# Patient Record
Sex: FEMALE | Race: WHITE | NOT HISPANIC OR LATINO
[De-identification: names, ages, dates, MRNs, and addresses within clinical notes are randomized per-mention and may not be internally consistent; named-entity substitution may affect disease eponyms.]

---

## 2017-01-05 ENCOUNTER — APPOINTMENT (OUTPATIENT)
Dept: ORTHOPEDIC SURGERY | Facility: CLINIC | Age: 72
End: 2017-01-05

## 2017-02-08 ENCOUNTER — APPOINTMENT (OUTPATIENT)
Dept: CT IMAGING | Facility: CLINIC | Age: 72
End: 2017-02-08

## 2017-02-08 ENCOUNTER — OUTPATIENT (OUTPATIENT)
Dept: OUTPATIENT SERVICES | Facility: HOSPITAL | Age: 72
LOS: 1 days | End: 2017-02-08
Payer: MEDICARE

## 2017-02-08 DIAGNOSIS — Z00.8 ENCOUNTER FOR OTHER GENERAL EXAMINATION: ICD-10-CM

## 2017-02-08 PROCEDURE — 71250 CT THORAX DX C-: CPT

## 2017-02-13 ENCOUNTER — OTHER (OUTPATIENT)
Age: 72
End: 2017-02-13

## 2017-02-16 ENCOUNTER — APPOINTMENT (OUTPATIENT)
Dept: ORTHOPEDIC SURGERY | Facility: CLINIC | Age: 72
End: 2017-02-16

## 2017-02-17 ENCOUNTER — APPOINTMENT (OUTPATIENT)
Dept: ORTHOPEDIC SURGERY | Facility: CLINIC | Age: 72
End: 2017-02-17

## 2017-02-17 VITALS
WEIGHT: 136 LBS | BODY MASS INDEX: 21.86 KG/M2 | SYSTOLIC BLOOD PRESSURE: 116 MMHG | HEIGHT: 66 IN | DIASTOLIC BLOOD PRESSURE: 73 MMHG | HEART RATE: 89 BPM

## 2017-02-17 DIAGNOSIS — Z82.61 FAMILY HISTORY OF ARTHRITIS: ICD-10-CM

## 2017-02-17 DIAGNOSIS — M54.5 LOW BACK PAIN: ICD-10-CM

## 2017-02-17 DIAGNOSIS — M40.205 UNSPECIFIED KYPHOSIS, THORACOLUMBAR REGION: ICD-10-CM

## 2017-02-17 DIAGNOSIS — Z82.62 FAMILY HISTORY OF OSTEOPOROSIS: ICD-10-CM

## 2017-02-17 DIAGNOSIS — Z78.9 OTHER SPECIFIED HEALTH STATUS: ICD-10-CM

## 2017-02-17 DIAGNOSIS — M43.10 SPONDYLOLISTHESIS, SITE UNSPECIFIED: ICD-10-CM

## 2017-04-13 ENCOUNTER — APPOINTMENT (OUTPATIENT)
Dept: INTERNAL MEDICINE | Facility: CLINIC | Age: 72
End: 2017-04-13

## 2017-06-23 ENCOUNTER — OTHER (OUTPATIENT)
Age: 72
End: 2017-06-23

## 2017-07-24 ENCOUNTER — APPOINTMENT (OUTPATIENT)
Dept: DERMATOLOGY | Facility: CLINIC | Age: 72
End: 2017-07-24

## 2017-08-07 ENCOUNTER — APPOINTMENT (OUTPATIENT)
Dept: DERMATOLOGY | Facility: CLINIC | Age: 72
End: 2017-08-07

## 2017-08-16 ENCOUNTER — APPOINTMENT (OUTPATIENT)
Dept: CT IMAGING | Facility: CLINIC | Age: 72
End: 2017-08-16
Payer: MEDICARE

## 2017-08-16 ENCOUNTER — OUTPATIENT (OUTPATIENT)
Dept: OUTPATIENT SERVICES | Facility: HOSPITAL | Age: 72
LOS: 1 days | End: 2017-08-16
Payer: MEDICARE

## 2017-08-16 DIAGNOSIS — Z00.8 ENCOUNTER FOR OTHER GENERAL EXAMINATION: ICD-10-CM

## 2017-08-16 PROCEDURE — 71250 CT THORAX DX C-: CPT

## 2017-08-16 PROCEDURE — 71250 CT THORAX DX C-: CPT | Mod: 26

## 2017-10-17 ENCOUNTER — APPOINTMENT (OUTPATIENT)
Dept: INTERNAL MEDICINE | Facility: CLINIC | Age: 72
End: 2017-10-17
Payer: MEDICARE

## 2017-10-17 PROCEDURE — 99214 OFFICE O/P EST MOD 30 MIN: CPT

## 2017-10-25 ENCOUNTER — APPOINTMENT (OUTPATIENT)
Dept: INTERNAL MEDICINE | Facility: CLINIC | Age: 72
End: 2017-10-25
Payer: MEDICARE

## 2017-10-25 PROCEDURE — 36415 COLL VENOUS BLD VENIPUNCTURE: CPT

## 2017-10-25 PROCEDURE — 99214 OFFICE O/P EST MOD 30 MIN: CPT | Mod: 25

## 2017-11-01 ENCOUNTER — APPOINTMENT (OUTPATIENT)
Dept: CT IMAGING | Facility: CLINIC | Age: 72
End: 2017-11-01
Payer: MEDICARE

## 2017-11-01 ENCOUNTER — OUTPATIENT (OUTPATIENT)
Dept: OUTPATIENT SERVICES | Facility: HOSPITAL | Age: 72
LOS: 1 days | End: 2017-11-01
Payer: MEDICARE

## 2017-11-01 DIAGNOSIS — J18.9 PNEUMONIA, UNSPECIFIED ORGANISM: ICD-10-CM

## 2017-11-01 DIAGNOSIS — R05 COUGH: ICD-10-CM

## 2017-11-01 DIAGNOSIS — Z00.8 ENCOUNTER FOR OTHER GENERAL EXAMINATION: ICD-10-CM

## 2017-11-01 PROCEDURE — 71260 CT THORAX DX C+: CPT | Mod: 26

## 2017-11-01 PROCEDURE — 71260 CT THORAX DX C+: CPT

## 2017-11-07 ENCOUNTER — APPOINTMENT (OUTPATIENT)
Dept: INTERNAL MEDICINE | Facility: CLINIC | Age: 72
End: 2017-11-07
Payer: MEDICARE

## 2017-11-07 PROCEDURE — 99215 OFFICE O/P EST HI 40 MIN: CPT

## 2017-11-09 ENCOUNTER — APPOINTMENT (OUTPATIENT)
Dept: INTERNAL MEDICINE | Facility: CLINIC | Age: 72
End: 2017-11-09

## 2017-11-15 ENCOUNTER — APPOINTMENT (OUTPATIENT)
Dept: PULMONOLOGY | Facility: CLINIC | Age: 72
End: 2017-11-15
Payer: MEDICARE

## 2017-11-15 ENCOUNTER — APPOINTMENT (OUTPATIENT)
Dept: DERMATOLOGY | Facility: CLINIC | Age: 72
End: 2017-11-15
Payer: MEDICARE

## 2017-11-15 VITALS
WEIGHT: 132 LBS | HEIGHT: 62.25 IN | DIASTOLIC BLOOD PRESSURE: 62 MMHG | SYSTOLIC BLOOD PRESSURE: 112 MMHG | BODY MASS INDEX: 23.98 KG/M2

## 2017-11-15 VITALS — OXYGEN SATURATION: 96 % | HEART RATE: 97 BPM

## 2017-11-15 PROCEDURE — 94060 EVALUATION OF WHEEZING: CPT

## 2017-11-15 PROCEDURE — 99214 OFFICE O/P EST MOD 30 MIN: CPT

## 2017-11-15 PROCEDURE — 99204 OFFICE O/P NEW MOD 45 MIN: CPT | Mod: 25

## 2017-11-15 RX ORDER — MELOXICAM 15 MG/1
15 TABLET ORAL
Qty: 30 | Refills: 0 | Status: DISCONTINUED | COMMUNITY
Start: 2016-07-29 | End: 2017-11-15

## 2017-11-15 RX ORDER — OXYCODONE AND ACETAMINOPHEN 5; 325 MG/1; MG/1
5-325 TABLET ORAL
Qty: 12 | Refills: 0 | Status: DISCONTINUED | COMMUNITY
Start: 2016-08-02 | End: 2017-11-15

## 2017-11-15 RX ORDER — METHYLPREDNISOLONE 4 MG/1
4 TABLET ORAL
Qty: 21 | Refills: 0 | Status: DISCONTINUED | COMMUNITY
Start: 2016-10-07 | End: 2017-11-15

## 2017-11-15 RX ORDER — FAMCICLOVIR 500 MG/1
500 TABLET, FILM COATED ORAL
Qty: 21 | Refills: 0 | Status: DISCONTINUED | COMMUNITY
Start: 2016-09-26 | End: 2017-11-15

## 2017-11-29 ENCOUNTER — APPOINTMENT (OUTPATIENT)
Dept: THORACIC SURGERY | Facility: CLINIC | Age: 72
End: 2017-11-29
Payer: MEDICARE

## 2017-11-29 VITALS
SYSTOLIC BLOOD PRESSURE: 145 MMHG | WEIGHT: 132 LBS | HEART RATE: 96 BPM | BODY MASS INDEX: 24.29 KG/M2 | HEIGHT: 62 IN | OXYGEN SATURATION: 100 % | DIASTOLIC BLOOD PRESSURE: 80 MMHG | RESPIRATION RATE: 16 BRPM

## 2017-11-29 PROCEDURE — 99205 OFFICE O/P NEW HI 60 MIN: CPT

## 2017-12-11 ENCOUNTER — APPOINTMENT (OUTPATIENT)
Dept: INTERNAL MEDICINE | Facility: CLINIC | Age: 72
End: 2017-12-11
Payer: MEDICARE

## 2017-12-11 PROCEDURE — 99214 OFFICE O/P EST MOD 30 MIN: CPT

## 2017-12-12 ENCOUNTER — APPOINTMENT (OUTPATIENT)
Dept: VASCULAR SURGERY | Facility: CLINIC | Age: 72
End: 2017-12-12
Payer: MEDICARE

## 2017-12-12 VITALS
HEART RATE: 92 BPM | OXYGEN SATURATION: 96 % | BODY MASS INDEX: 24.66 KG/M2 | TEMPERATURE: 98.7 F | SYSTOLIC BLOOD PRESSURE: 135 MMHG | WEIGHT: 134 LBS | RESPIRATION RATE: 16 BRPM | DIASTOLIC BLOOD PRESSURE: 82 MMHG | HEIGHT: 62 IN

## 2017-12-12 DIAGNOSIS — Z82.49 FAMILY HISTORY OF ISCHEMIC HEART DISEASE AND OTHER DISEASES OF THE CIRCULATORY SYSTEM: ICD-10-CM

## 2017-12-12 DIAGNOSIS — Z83.3 FAMILY HISTORY OF DIABETES MELLITUS: ICD-10-CM

## 2017-12-12 PROCEDURE — 99203 OFFICE O/P NEW LOW 30 MIN: CPT

## 2017-12-15 ENCOUNTER — APPOINTMENT (OUTPATIENT)
Dept: VASCULAR SURGERY | Facility: CLINIC | Age: 72
End: 2017-12-15
Payer: MEDICARE

## 2017-12-15 PROCEDURE — 93970 EXTREMITY STUDY: CPT

## 2017-12-28 PROBLEM — Z82.49 FAMILY HISTORY OF VASCULAR DISORDER: Status: ACTIVE | Noted: 2017-12-12

## 2017-12-28 PROBLEM — Z83.3 FAMILY HISTORY OF DIABETES MELLITUS: Status: ACTIVE | Noted: 2017-12-12

## 2018-01-10 ENCOUNTER — APPOINTMENT (OUTPATIENT)
Dept: PULMONOLOGY | Facility: CLINIC | Age: 73
End: 2018-01-10
Payer: MEDICARE

## 2018-01-10 ENCOUNTER — APPOINTMENT (OUTPATIENT)
Dept: DERMATOLOGY | Facility: CLINIC | Age: 73
End: 2018-01-10
Payer: MEDICARE

## 2018-01-10 VITALS
SYSTOLIC BLOOD PRESSURE: 115 MMHG | WEIGHT: 130 LBS | HEART RATE: 99 BPM | DIASTOLIC BLOOD PRESSURE: 60 MMHG | BODY MASS INDEX: 23.78 KG/M2 | OXYGEN SATURATION: 98 %

## 2018-01-10 PROCEDURE — 99215 OFFICE O/P EST HI 40 MIN: CPT

## 2018-01-10 PROCEDURE — 17110 DESTRUCTION B9 LES UP TO 14: CPT

## 2018-01-10 PROCEDURE — 99214 OFFICE O/P EST MOD 30 MIN: CPT | Mod: 25

## 2018-01-10 RX ORDER — AZITHROMYCIN 250 MG/1
250 TABLET, FILM COATED ORAL
Qty: 6 | Refills: 0 | Status: DISCONTINUED | COMMUNITY
Start: 2017-10-10 | End: 2018-01-10

## 2018-01-10 RX ORDER — CLOTRIMAZOLE AND BETAMETHASONE DIPROPIONATE 10; .5 MG/G; MG/G
1-0.05 CREAM TOPICAL
Qty: 45 | Refills: 0 | Status: DISCONTINUED | COMMUNITY
Start: 2017-08-17 | End: 2018-01-10

## 2018-01-10 RX ORDER — PREDNISONE 20 MG/1
20 TABLET ORAL
Qty: 12 | Refills: 0 | Status: DISCONTINUED | COMMUNITY
Start: 2017-10-17 | End: 2018-01-10

## 2018-01-10 RX ORDER — BENZONATATE 200 MG/1
200 CAPSULE ORAL
Qty: 21 | Refills: 0 | Status: DISCONTINUED | COMMUNITY
Start: 2017-10-25

## 2018-01-28 ENCOUNTER — FORM ENCOUNTER (OUTPATIENT)
Age: 73
End: 2018-01-28

## 2018-01-29 ENCOUNTER — APPOINTMENT (OUTPATIENT)
Dept: ULTRASOUND IMAGING | Facility: CLINIC | Age: 73
End: 2018-01-29
Payer: MEDICARE

## 2018-01-29 ENCOUNTER — APPOINTMENT (OUTPATIENT)
Dept: CT IMAGING | Facility: CLINIC | Age: 73
End: 2018-01-29
Payer: MEDICARE

## 2018-01-29 ENCOUNTER — OUTPATIENT (OUTPATIENT)
Dept: OUTPATIENT SERVICES | Facility: HOSPITAL | Age: 73
LOS: 1 days | End: 2018-01-29
Payer: MEDICARE

## 2018-01-29 ENCOUNTER — APPOINTMENT (OUTPATIENT)
Dept: MAMMOGRAPHY | Facility: CLINIC | Age: 73
End: 2018-01-29
Payer: MEDICARE

## 2018-01-29 DIAGNOSIS — M79.604 PAIN IN RIGHT LEG: ICD-10-CM

## 2018-01-29 DIAGNOSIS — M79.605 PAIN IN LEFT LEG: ICD-10-CM

## 2018-01-29 DIAGNOSIS — R91.1 SOLITARY PULMONARY NODULE: ICD-10-CM

## 2018-01-29 PROCEDURE — 93880 EXTRACRANIAL BILAT STUDY: CPT | Mod: 26

## 2018-01-29 PROCEDURE — 76641 ULTRASOUND BREAST COMPLETE: CPT | Mod: 26,50

## 2018-01-29 PROCEDURE — 71260 CT THORAX DX C+: CPT

## 2018-01-29 PROCEDURE — 77063 BREAST TOMOSYNTHESIS BI: CPT | Mod: 26

## 2018-01-29 PROCEDURE — 93880 EXTRACRANIAL BILAT STUDY: CPT

## 2018-01-29 PROCEDURE — 71260 CT THORAX DX C+: CPT | Mod: 26

## 2018-01-29 PROCEDURE — 77063 BREAST TOMOSYNTHESIS BI: CPT

## 2018-01-29 PROCEDURE — 77067 SCR MAMMO BI INCL CAD: CPT

## 2018-01-29 PROCEDURE — 82565 ASSAY OF CREATININE: CPT

## 2018-01-29 PROCEDURE — 77067 SCR MAMMO BI INCL CAD: CPT | Mod: 26

## 2018-01-29 PROCEDURE — 76641 ULTRASOUND BREAST COMPLETE: CPT

## 2018-02-12 LAB — IGG SER QL IEP: 829 MG/DL

## 2018-02-16 ENCOUNTER — TRANSCRIPTION ENCOUNTER (OUTPATIENT)
Age: 73
End: 2018-02-16

## 2018-03-09 ENCOUNTER — APPOINTMENT (OUTPATIENT)
Dept: THORACIC SURGERY | Facility: CLINIC | Age: 73
End: 2018-03-09
Payer: MEDICARE

## 2018-03-09 VITALS
HEIGHT: 62 IN | BODY MASS INDEX: 23.92 KG/M2 | HEART RATE: 97 BPM | RESPIRATION RATE: 16 BRPM | OXYGEN SATURATION: 90 % | SYSTOLIC BLOOD PRESSURE: 129 MMHG | DIASTOLIC BLOOD PRESSURE: 76 MMHG | WEIGHT: 130 LBS

## 2018-03-09 PROCEDURE — 99213 OFFICE O/P EST LOW 20 MIN: CPT

## 2018-03-22 ENCOUNTER — APPOINTMENT (OUTPATIENT)
Dept: INTERNAL MEDICINE | Facility: CLINIC | Age: 73
End: 2018-03-22

## 2018-04-16 ENCOUNTER — APPOINTMENT (OUTPATIENT)
Dept: INTERNAL MEDICINE | Facility: CLINIC | Age: 73
End: 2018-04-16
Payer: MEDICARE

## 2018-04-16 PROCEDURE — 90670 PCV13 VACCINE IM: CPT

## 2018-04-16 PROCEDURE — G0009: CPT

## 2018-04-16 PROCEDURE — 99214 OFFICE O/P EST MOD 30 MIN: CPT | Mod: 25

## 2018-04-25 ENCOUNTER — APPOINTMENT (OUTPATIENT)
Dept: INTERNAL MEDICINE | Facility: CLINIC | Age: 73
End: 2018-04-25
Payer: MEDICARE

## 2018-04-25 PROCEDURE — 93000 ELECTROCARDIOGRAM COMPLETE: CPT

## 2018-04-25 PROCEDURE — 36415 COLL VENOUS BLD VENIPUNCTURE: CPT

## 2018-04-25 PROCEDURE — G0439: CPT

## 2018-04-25 PROCEDURE — 99215 OFFICE O/P EST HI 40 MIN: CPT | Mod: 25

## 2018-04-30 ENCOUNTER — APPOINTMENT (OUTPATIENT)
Dept: PULMONOLOGY | Facility: CLINIC | Age: 73
End: 2018-04-30
Payer: MEDICARE

## 2018-04-30 VITALS
SYSTOLIC BLOOD PRESSURE: 120 MMHG | BODY MASS INDEX: 24.33 KG/M2 | OXYGEN SATURATION: 99 % | DIASTOLIC BLOOD PRESSURE: 80 MMHG | HEART RATE: 65 BPM | WEIGHT: 133 LBS

## 2018-04-30 PROCEDURE — 99214 OFFICE O/P EST MOD 30 MIN: CPT

## 2018-07-10 ENCOUNTER — RX RENEWAL (OUTPATIENT)
Age: 73
End: 2018-07-10

## 2018-08-08 ENCOUNTER — APPOINTMENT (OUTPATIENT)
Dept: DERMATOLOGY | Facility: CLINIC | Age: 73
End: 2018-08-08
Payer: MEDICARE

## 2018-08-08 PROCEDURE — 99214 OFFICE O/P EST MOD 30 MIN: CPT

## 2018-08-14 ENCOUNTER — MEDICATION RENEWAL (OUTPATIENT)
Age: 73
End: 2018-08-14

## 2018-08-31 ENCOUNTER — MEDICATION RENEWAL (OUTPATIENT)
Age: 73
End: 2018-08-31

## 2018-10-02 ENCOUNTER — MEDICATION RENEWAL (OUTPATIENT)
Age: 73
End: 2018-10-02

## 2018-11-09 ENCOUNTER — MEDICATION RENEWAL (OUTPATIENT)
Age: 73
End: 2018-11-09

## 2018-11-12 ENCOUNTER — APPOINTMENT (OUTPATIENT)
Dept: PULMONOLOGY | Facility: CLINIC | Age: 73
End: 2018-11-12
Payer: MEDICARE

## 2018-11-12 VITALS — OXYGEN SATURATION: 98 % | DIASTOLIC BLOOD PRESSURE: 80 MMHG | HEART RATE: 76 BPM | SYSTOLIC BLOOD PRESSURE: 140 MMHG

## 2018-11-12 VITALS — WEIGHT: 137 LBS | BODY MASS INDEX: 25.06 KG/M2

## 2018-11-12 PROCEDURE — 99214 OFFICE O/P EST MOD 30 MIN: CPT | Mod: 25

## 2018-11-12 PROCEDURE — 94010 BREATHING CAPACITY TEST: CPT

## 2018-12-11 ENCOUNTER — MEDICATION RENEWAL (OUTPATIENT)
Age: 73
End: 2018-12-11

## 2019-01-11 ENCOUNTER — APPOINTMENT (OUTPATIENT)
Dept: INTERNAL MEDICINE | Facility: CLINIC | Age: 74
End: 2019-01-11
Payer: MEDICARE

## 2019-01-11 VITALS
TEMPERATURE: 99.1 F | WEIGHT: 137 LBS | HEIGHT: 62 IN | BODY MASS INDEX: 25.21 KG/M2 | DIASTOLIC BLOOD PRESSURE: 70 MMHG | SYSTOLIC BLOOD PRESSURE: 120 MMHG

## 2019-01-11 DIAGNOSIS — H65.192 OTHER ACUTE NONSUPPURATIVE OTITIS MEDIA, LEFT EAR: ICD-10-CM

## 2019-01-11 PROCEDURE — 99214 OFFICE O/P EST MOD 30 MIN: CPT

## 2019-01-11 RX ORDER — LEVOFLOXACIN 500 MG/1
500 TABLET, FILM COATED ORAL DAILY
Qty: 5 | Refills: 0 | Status: DISCONTINUED | COMMUNITY
Start: 2018-11-12 | End: 2019-01-11

## 2019-01-11 RX ORDER — IBUPROFEN 600 MG/1
600 TABLET, FILM COATED ORAL
Qty: 21 | Refills: 0 | Status: DISCONTINUED | COMMUNITY
Start: 2018-10-08

## 2019-01-11 RX ORDER — DOXYCYCLINE HYCLATE 100 MG/1
100 CAPSULE ORAL
Qty: 14 | Refills: 0 | Status: COMPLETED | COMMUNITY
Start: 2019-01-11 | End: 2019-01-11

## 2019-01-11 RX ORDER — FUROSEMIDE 20 MG/1
20 TABLET ORAL
Qty: 14 | Refills: 0 | Status: DISCONTINUED | COMMUNITY
Start: 2018-12-10

## 2019-01-11 RX ORDER — DIAZEPAM 10 MG/1
10 TABLET ORAL
Qty: 1 | Refills: 0 | Status: DISCONTINUED | COMMUNITY
Start: 2018-09-13

## 2019-01-11 NOTE — REVIEW OF SYSTEMS
[Earache] : earache [Nasal Discharge] : nasal discharge [Sore Throat] : sore throat [Postnasal Drip] : postnasal drip [Cough] : cough [Negative] : Heme/Lymph [Shortness Of Breath] : no shortness of breath [Wheezing] : no wheezing [Dyspnea on Exertion] : no dyspnea on exertion

## 2019-01-11 NOTE — HISTORY OF PRESENT ILLNESS
[FreeTextEntry1] : sore throat / cough [de-identified] : Ms. YELITZA VALDEZ is a 73 year female with a PMH of bronchiectasis comes to the office c/o 2 day history of POst nasal drip, nasal discharge, left ear pain.

## 2019-01-11 NOTE — PHYSICAL EXAM
[No Acute Distress] : no acute distress [Well Nourished] : well nourished [Well Developed] : well developed [Well-Appearing] : well-appearing [Normal Sclera/Conjunctiva] : normal sclera/conjunctiva [PERRL] : pupils equal round and reactive to light [EOMI] : extraocular movements intact [Normal Outer Ear/Nose] : the outer ears and nose were normal in appearance [No JVD] : no jugular venous distention [Supple] : supple [No Respiratory Distress] : no respiratory distress  [Clear to Auscultation] : lungs were clear to auscultation bilaterally [No Accessory Muscle Use] : no accessory muscle use [Normal Rate] : normal rate  [Regular Rhythm] : with a regular rhythm [Normal S1, S2] : normal S1 and S2 [No Carotid Bruits] : no carotid bruits [No Abdominal Bruit] : a ~M bruit was not heard ~T in the abdomen [No Varicosities] : no varicosities [Soft] : abdomen soft [Non Tender] : non-tender [Non-distended] : non-distended [Normal Posterior Cervical Nodes] : no posterior cervical lymphadenopathy [Normal Anterior Cervical Nodes] : no anterior cervical lymphadenopathy [No CVA Tenderness] : no CVA  tenderness [No Spinal Tenderness] : no spinal tenderness [de-identified] : left TM erythematous and bulging, cobblestoning of pharynx

## 2019-01-11 NOTE — PLAN
[FreeTextEntry1] : Patient likely has ottitis media with sinusitis and postnasal drip. WIll start doxycycline as patient has PCN allergy.\par \par Counseling included abnormal lab results, differential diagnoses, treatment options, risks and benefits, lifestyle changes, prognosis, current condition, medications, and dose adjustments. \par The patient was interactive, attentive, asked questions, and verbalized understanding

## 2019-01-14 ENCOUNTER — FORM ENCOUNTER (OUTPATIENT)
Age: 74
End: 2019-01-14

## 2019-01-15 ENCOUNTER — APPOINTMENT (OUTPATIENT)
Dept: CT IMAGING | Facility: CLINIC | Age: 74
End: 2019-01-15
Payer: MEDICARE

## 2019-01-15 ENCOUNTER — OUTPATIENT (OUTPATIENT)
Dept: OUTPATIENT SERVICES | Facility: HOSPITAL | Age: 74
LOS: 1 days | End: 2019-01-15
Payer: MEDICARE

## 2019-01-15 DIAGNOSIS — R91.1 SOLITARY PULMONARY NODULE: ICD-10-CM

## 2019-01-15 PROCEDURE — 71250 CT THORAX DX C-: CPT

## 2019-01-15 PROCEDURE — 71250 CT THORAX DX C-: CPT | Mod: 26

## 2019-01-18 ENCOUNTER — MESSAGE (OUTPATIENT)
Age: 74
End: 2019-01-18

## 2019-01-23 ENCOUNTER — MEDICATION RENEWAL (OUTPATIENT)
Age: 74
End: 2019-01-23

## 2019-01-25 ENCOUNTER — MEDICATION RENEWAL (OUTPATIENT)
Age: 74
End: 2019-01-25

## 2019-01-25 RX ORDER — DEXTROAMPHETAMINE SACCHARATE, AMPHETAMINE ASPARTATE, DEXTROAMPHETAMINE SULFATE AND AMPHETAMINE SULFATE 7.5; 7.5; 7.5; 7.5 MG/1; MG/1; MG/1; MG/1
30 TABLET ORAL
Qty: 90 | Refills: 0 | Status: DISCONTINUED | COMMUNITY
End: 2019-01-25

## 2019-01-27 ENCOUNTER — MOBILE ON CALL (OUTPATIENT)
Age: 74
End: 2019-01-27

## 2019-01-30 ENCOUNTER — APPOINTMENT (OUTPATIENT)
Dept: INTERNAL MEDICINE | Facility: CLINIC | Age: 74
End: 2019-01-30
Payer: MEDICARE

## 2019-01-30 VITALS
SYSTOLIC BLOOD PRESSURE: 100 MMHG | BODY MASS INDEX: 24.48 KG/M2 | HEIGHT: 62 IN | WEIGHT: 133 LBS | DIASTOLIC BLOOD PRESSURE: 70 MMHG

## 2019-01-30 DIAGNOSIS — J06.9 ACUTE UPPER RESPIRATORY INFECTION, UNSPECIFIED: ICD-10-CM

## 2019-01-30 PROCEDURE — 99214 OFFICE O/P EST MOD 30 MIN: CPT

## 2019-01-30 NOTE — PLAN
[FreeTextEntry1] : 74YO FEMALE HERE FOR FOLLOWUP HAS HX OF ADD HAS BEEN ON ADDERALL FOR YEARS WAS GIVEN BY PREVIOUS PMD  DR KONG AND DID ORIGINALLY SEE NEUROLOGIST PRIOR TO PMD GIVING ADDERALL\par HAS HAD COUGH URI SX AND HAS BEEN ON ABX\par HAS SEEN PULMONARY AS HAS HAD A NODULE HAT THEY  HAVE BEEN FOLLOWING \par WILL RX MEDROL DOSE PACK\par AS PT ON ADDERALL WILL FOLLOW UP HERE MORE OFTNE\par

## 2019-01-30 NOTE — HISTORY OF PRESENT ILLNESS
[FreeTextEntry1] : Cough  for  3 Weeks [de-identified] : 72YO FEMALE HERE FOR FOLLOWUP HAS HX OF ADD HAS BEEN ON ADDERALL FOR YEARS WAS GIVEN BY PREVIOUS PMD  DR KONG AND DID ORIGINALLY SEE NEUROLOGIST PRIOR TO PMD GIVING ADDERALL\par HAS HAD COUGH URI SX AND HAS BEEN ON ABX\par HAS SEEN PULMONARY AS HAS HAD A NODULE HAT THEY  HAVE BEEN FOLLOWING \par

## 2019-02-27 ENCOUNTER — APPOINTMENT (OUTPATIENT)
Dept: DERMATOLOGY | Facility: CLINIC | Age: 74
End: 2019-02-27
Payer: MEDICARE

## 2019-02-27 PROCEDURE — 99214 OFFICE O/P EST MOD 30 MIN: CPT

## 2019-03-21 ENCOUNTER — MEDICATION RENEWAL (OUTPATIENT)
Age: 74
End: 2019-03-21

## 2019-04-25 ENCOUNTER — APPOINTMENT (OUTPATIENT)
Dept: PULMONOLOGY | Facility: CLINIC | Age: 74
End: 2019-04-25

## 2019-05-07 ENCOUNTER — MEDICATION RENEWAL (OUTPATIENT)
Age: 74
End: 2019-05-07

## 2019-05-29 ENCOUNTER — APPOINTMENT (OUTPATIENT)
Dept: INTERNAL MEDICINE | Facility: CLINIC | Age: 74
End: 2019-05-29
Payer: MEDICARE

## 2019-05-29 ENCOUNTER — NON-APPOINTMENT (OUTPATIENT)
Age: 74
End: 2019-05-29

## 2019-05-29 VITALS
SYSTOLIC BLOOD PRESSURE: 120 MMHG | HEIGHT: 62 IN | WEIGHT: 132 LBS | BODY MASS INDEX: 24.29 KG/M2 | DIASTOLIC BLOOD PRESSURE: 70 MMHG

## 2019-05-29 DIAGNOSIS — Z23 ENCOUNTER FOR IMMUNIZATION: ICD-10-CM

## 2019-05-29 PROCEDURE — 36415 COLL VENOUS BLD VENIPUNCTURE: CPT

## 2019-05-29 PROCEDURE — G0439: CPT

## 2019-05-29 PROCEDURE — 93000 ELECTROCARDIOGRAM COMPLETE: CPT

## 2019-05-29 PROCEDURE — 90732 PPSV23 VACC 2 YRS+ SUBQ/IM: CPT

## 2019-05-29 PROCEDURE — 99215 OFFICE O/P EST HI 40 MIN: CPT | Mod: 25

## 2019-05-29 PROCEDURE — G0009: CPT

## 2019-05-29 NOTE — HISTORY OF PRESENT ILLNESS
[FreeTextEntry1] : ANNUAL EXAM [de-identified] : 72YO FEMALE HERE FOR ANNUAL EXAM  HAS HX OF ADD HAS BEEN ON ADDERALL FOR YEARS WAS GIVEN BY PREVIOUS PMD  DR KONG AND DID ORIGINALLY SEE NEUROLOGIST PRIOR TO PMD GIVING ADDERALL\par  \par HAS SEEN PULMONARY AS HAS HAD A NODULE HAT THEY  HAVE BEEN FOLLOWING THIS \par OVERALL FEELS OK \par

## 2019-05-29 NOTE — PHYSICAL EXAM
[No Acute Distress] : no acute distress [Well Nourished] : well nourished [Well Developed] : well developed [Normal Sclera/Conjunctiva] : normal sclera/conjunctiva [Well-Appearing] : well-appearing [PERRL] : pupils equal round and reactive to light [EOMI] : extraocular movements intact [Normal Oropharynx] : the oropharynx was normal [Normal Outer Ear/Nose] : the outer ears and nose were normal in appearance [Supple] : supple [No JVD] : no jugular venous distention [Thyroid Normal, No Nodules] : the thyroid was normal and there were no nodules present [No Lymphadenopathy] : no lymphadenopathy [No Respiratory Distress] : no respiratory distress  [Clear to Auscultation] : lungs were clear to auscultation bilaterally [No Accessory Muscle Use] : no accessory muscle use [Normal Rate] : normal rate  [Regular Rhythm] : with a regular rhythm [Normal S1, S2] : normal S1 and S2 [No Abdominal Bruit] : a ~M bruit was not heard ~T in the abdomen [No Carotid Bruits] : no carotid bruits [No Murmur] : no murmur heard [Pedal Pulses Present] : the pedal pulses are present [No Varicosities] : no varicosities [No Edema] : there was no peripheral edema [No Extremity Clubbing/Cyanosis] : no extremity clubbing/cyanosis [Soft] : abdomen soft [No Palpable Aorta] : no palpable aorta [Non-distended] : non-distended [Non Tender] : non-tender [No HSM] : no HSM [No Masses] : no abdominal mass palpated [Normal Bowel Sounds] : normal bowel sounds [Normal Anterior Cervical Nodes] : no anterior cervical lymphadenopathy [No CVA Tenderness] : no CVA  tenderness [Normal Posterior Cervical Nodes] : no posterior cervical lymphadenopathy [No Spinal Tenderness] : no spinal tenderness [No Joint Swelling] : no joint swelling [Grossly Normal Strength/Tone] : grossly normal strength/tone [No Rash] : no rash [Normal Gait] : normal gait [No Focal Deficits] : no focal deficits [Coordination Grossly Intact] : coordination grossly intact [Normal Insight/Judgement] : insight and judgment were intact [Deep Tendon Reflexes (DTR)] : deep tendon reflexes were 2+ and symmetric [Normal Affect] : the affect was normal

## 2019-05-29 NOTE — PLAN
[FreeTextEntry1] : 74YO FEMALE HERE FOR  ANNUAL EXAM HAS HX OF ADD HAS BEEN ON ADDERALL FOR YEARS WAS GIVEN BY PREVIOUS PMD  DR KONG AND DID ORIGINALLY SEE NEUROLOGIST PRIOR TO PMD GIVING ADDERALL\par  HAS SEEN PULMONARY AS HAS HAD A NODULE HAT THEY  HAVE BEEN FOLLOWING \par  AS PT ON ADDERALL WILL FOLLOW UP HERE MORE OFTEN\par UP TO DATE COLONOSCOPY\par PNEUMOVAX 23 GIVEN TODAY\par \par

## 2019-05-29 NOTE — HEALTH RISK ASSESSMENT
[No falls in past year] : Patient reported no falls in the past year [] : No [0] : 1) Little interest or pleasure doing things: Not at all (0)

## 2019-06-03 LAB
25(OH)D3 SERPL-MCNC: 25.2 NG/ML
ALBUMIN SERPL ELPH-MCNC: 4.7 G/DL
ALP BLD-CCNC: 88 U/L
ALT SERPL-CCNC: 11 U/L
ANION GAP SERPL CALC-SCNC: 12 MMOL/L
APPEARANCE: CLEAR
AST SERPL-CCNC: 13 U/L
BASOPHILS # BLD AUTO: 0.04 K/UL
BASOPHILS NFR BLD AUTO: 0.7 %
BILIRUB SERPL-MCNC: 0.4 MG/DL
BILIRUBIN URINE: NEGATIVE
BLOOD URINE: NEGATIVE
BUN SERPL-MCNC: 10 MG/DL
CALCIUM SERPL-MCNC: 9.8 MG/DL
CHLORIDE SERPL-SCNC: 105 MMOL/L
CHOLEST SERPL-MCNC: 203 MG/DL
CHOLEST/HDLC SERPL: 2.5 RATIO
CO2 SERPL-SCNC: 25 MMOL/L
COLOR: NORMAL
CREAT SERPL-MCNC: 0.55 MG/DL
EOSINOPHIL # BLD AUTO: 0.08 K/UL
EOSINOPHIL NFR BLD AUTO: 1.3 %
ESTIMATED AVERAGE GLUCOSE: 111 MG/DL
GLUCOSE QUALITATIVE U: NEGATIVE
GLUCOSE SERPL-MCNC: 90 MG/DL
HBA1C MFR BLD HPLC: 5.5 %
HCT VFR BLD CALC: 45.6 %
HDLC SERPL-MCNC: 80 MG/DL
HGB BLD-MCNC: 13.8 G/DL
IMM GRANULOCYTES NFR BLD AUTO: 0.3 %
KETONES URINE: NEGATIVE
LDLC SERPL CALC-MCNC: 109 MG/DL
LEUKOCYTE ESTERASE URINE: NEGATIVE
LYMPHOCYTES # BLD AUTO: 2.2 K/UL
LYMPHOCYTES NFR BLD AUTO: 36.4 %
MAN DIFF?: NORMAL
MCHC RBC-ENTMCNC: 29.4 PG
MCHC RBC-ENTMCNC: 30.3 GM/DL
MCV RBC AUTO: 97 FL
MONOCYTES # BLD AUTO: 0.4 K/UL
MONOCYTES NFR BLD AUTO: 6.6 %
NEUTROPHILS # BLD AUTO: 3.31 K/UL
NEUTROPHILS NFR BLD AUTO: 54.7 %
NITRITE URINE: NEGATIVE
PH URINE: 7
PLATELET # BLD AUTO: 276 K/UL
POTASSIUM SERPL-SCNC: 4.6 MMOL/L
PROT SERPL-MCNC: 6.5 G/DL
PROTEIN URINE: NEGATIVE
RBC # BLD: 4.7 M/UL
RBC # FLD: 14 %
SODIUM SERPL-SCNC: 142 MMOL/L
SPECIFIC GRAVITY URINE: 1.01
T4 SERPL-MCNC: 7.1 UG/DL
TRIGL SERPL-MCNC: 71 MG/DL
TSH SERPL-ACNC: 2.07 UIU/ML
UROBILINOGEN URINE: NORMAL
WBC # FLD AUTO: 6.05 K/UL

## 2019-06-07 ENCOUNTER — APPOINTMENT (OUTPATIENT)
Dept: INTERNAL MEDICINE | Facility: CLINIC | Age: 74
End: 2019-06-07
Payer: MEDICARE

## 2019-06-07 VITALS
BODY MASS INDEX: 17.88 KG/M2 | WEIGHT: 132 LBS | DIASTOLIC BLOOD PRESSURE: 75 MMHG | HEIGHT: 72 IN | SYSTOLIC BLOOD PRESSURE: 115 MMHG

## 2019-06-07 PROCEDURE — 99214 OFFICE O/P EST MOD 30 MIN: CPT

## 2019-06-07 NOTE — HISTORY OF PRESENT ILLNESS
[FreeTextEntry1] : FOLLOW UP ON LEGS [de-identified] : 72YO FEMALE HERE  FOLLOW UP ON LOWER EXTREMITY STASIS DERMATITIS AND VENOUS INSUFFICIENCY\par

## 2019-06-07 NOTE — PHYSICAL EXAM
[No Acute Distress] : no acute distress [Well-Appearing] : well-appearing [Well Nourished] : well nourished [Well Developed] : well developed [Normal Sclera/Conjunctiva] : normal sclera/conjunctiva [PERRL] : pupils equal round and reactive to light [Normal Outer Ear/Nose] : the outer ears and nose were normal in appearance [EOMI] : extraocular movements intact [No JVD] : no jugular venous distention [Normal Oropharynx] : the oropharynx was normal [Supple] : supple [No Lymphadenopathy] : no lymphadenopathy [No Respiratory Distress] : no respiratory distress  [Thyroid Normal, No Nodules] : the thyroid was normal and there were no nodules present [Clear to Auscultation] : lungs were clear to auscultation bilaterally [Normal Rate] : normal rate  [No Accessory Muscle Use] : no accessory muscle use [Regular Rhythm] : with a regular rhythm [Normal S1, S2] : normal S1 and S2 [No Murmur] : no murmur heard [No Carotid Bruits] : no carotid bruits [No Edema] : there was no peripheral edema [Soft] : abdomen soft [Non Tender] : non-tender [No HSM] : no HSM [No Masses] : no abdominal mass palpated [Non-distended] : non-distended [Normal Bowel Sounds] : normal bowel sounds [Normal Posterior Cervical Nodes] : no posterior cervical lymphadenopathy [Normal Anterior Cervical Nodes] : no anterior cervical lymphadenopathy [No CVA Tenderness] : no CVA  tenderness [No Spinal Tenderness] : no spinal tenderness [No Joint Swelling] : no joint swelling [Grossly Normal Strength/Tone] : grossly normal strength/tone [No Rash] : no rash [Normal Gait] : normal gait [Coordination Grossly Intact] : coordination grossly intact [No Focal Deficits] : no focal deficits [Deep Tendon Reflexes (DTR)] : deep tendon reflexes were 2+ and symmetric [Normal Affect] : the affect was normal [Normal Insight/Judgement] : insight and judgment were intact [de-identified] : VENOSU STASI CHANGES LOWE EXTREMITS WITH PLAQUES  NO OPEN ULCERS

## 2019-06-07 NOTE — PLAN
[FreeTextEntry1] : 74YO FEMALE HERE FOR FOLLOWUP STASIS DERMATITIS NO CHANGE WITH LIDEX WILL D/C REFER TO VASCULAR SURGERY FOR EVAL DEFER ABX NO INFECTION \par \par

## 2019-06-13 ENCOUNTER — MEDICATION RENEWAL (OUTPATIENT)
Age: 74
End: 2019-06-13

## 2019-06-24 ENCOUNTER — NON-APPOINTMENT (OUTPATIENT)
Age: 74
End: 2019-06-24

## 2019-06-24 ENCOUNTER — APPOINTMENT (OUTPATIENT)
Dept: CARDIOLOGY | Facility: CLINIC | Age: 74
End: 2019-06-24
Payer: MEDICARE

## 2019-06-24 VITALS
HEART RATE: 80 BPM | HEIGHT: 63 IN | WEIGHT: 131 LBS | DIASTOLIC BLOOD PRESSURE: 79 MMHG | OXYGEN SATURATION: 96 % | SYSTOLIC BLOOD PRESSURE: 130 MMHG | RESPIRATION RATE: 16 BRPM | BODY MASS INDEX: 23.21 KG/M2

## 2019-06-24 VITALS — DIASTOLIC BLOOD PRESSURE: 88 MMHG | SYSTOLIC BLOOD PRESSURE: 129 MMHG

## 2019-06-24 PROCEDURE — 93000 ELECTROCARDIOGRAM COMPLETE: CPT

## 2019-06-24 PROCEDURE — 99204 OFFICE O/P NEW MOD 45 MIN: CPT | Mod: 25

## 2019-06-24 RX ORDER — FLUOCINONIDE 0.05 MG/G
0.05 OINTMENT TOPICAL TWICE DAILY
Qty: 1 | Refills: 1 | Status: DISCONTINUED | COMMUNITY
Start: 2019-05-29 | End: 2019-06-24

## 2019-06-24 RX ORDER — LEVOFLOXACIN 500 MG/1
500 TABLET, FILM COATED ORAL DAILY
Qty: 7 | Refills: 0 | Status: DISCONTINUED | COMMUNITY
Start: 2019-01-11 | End: 2019-06-24

## 2019-06-24 RX ORDER — CLINDAMYCIN HYDROCHLORIDE 300 MG/1
300 CAPSULE ORAL 3 TIMES DAILY
Qty: 30 | Refills: 0 | Status: DISCONTINUED | COMMUNITY
Start: 2019-01-17 | End: 2019-06-24

## 2019-06-24 RX ORDER — AMITRIPTYLINE HYDROCHLORIDE 25 MG/1
25 TABLET, FILM COATED ORAL
Qty: 30 | Refills: 0 | Status: DISCONTINUED | COMMUNITY
Start: 2016-11-28 | End: 2019-06-24

## 2019-06-24 RX ORDER — ALBUTEROL SULFATE 90 UG/1
108 (90 BASE) AEROSOL, METERED RESPIRATORY (INHALATION)
Qty: 1 | Refills: 5 | Status: DISCONTINUED | COMMUNITY
Start: 2018-01-10 | End: 2019-06-24

## 2019-06-24 RX ORDER — ALBUTEROL SULFATE 2.5 MG/3ML
(2.5 MG/3ML) SOLUTION RESPIRATORY (INHALATION)
Qty: 1 | Refills: 3 | Status: DISCONTINUED | COMMUNITY
Start: 2018-01-10 | End: 2019-06-24

## 2019-06-24 RX ORDER — BROMPHENIRAMINE MALEATE, PSEUDOEPHEDRINE HYDROCHLORIDE AND DEXTROMETHORPHAN HYDROBROMIDE 2; 30; 10 MG/5ML; MG/5ML; MG/5ML
30-2-10 SYRUP ORAL
Qty: 1 | Refills: 0 | Status: DISCONTINUED | COMMUNITY
Start: 2019-01-11 | End: 2019-06-24

## 2019-06-24 NOTE — HISTORY OF PRESENT ILLNESS
[FreeTextEntry1] : 74 yo female ween with leg edema.\par She has varicose vein from years of standing, some genetic factors and bearing two children. She had vein ablation with compilations including pain in lower extremity as well as discoloration of the leg. She has no chest pain. She does not smoke and denies any claudication.\par She denies any syncope or presyncope.

## 2019-06-24 NOTE — PHYSICAL EXAM
[Normal Appearance] : normal appearance [Well Groomed] : well groomed [Normal Conjunctiva] : the conjunctiva exhibited no abnormalities [Eyelids - No Xanthelasma] : the eyelids demonstrated no xanthelasmas [Normal Oral Mucosa] : normal oral mucosa [Normal Jugular Venous V Waves Present] : normal jugular venous V waves present [Normal Oropharynx] : normal oropharynx [Heart Rate And Rhythm] : heart rate and rhythm were normal [Heart Sounds] : normal S1 and S2 [Veins - Varicosity Changes] : no varicosital changes were noted in the lower extremities [FreeTextEntry1] : superficial thrombosis of the right inner calf, 1+ edema right leg, no edema left leg. DP normal bilaterally.  [Respiration, Rhythm And Depth] : normal respiratory rhythm and effort [Auscultation Breath Sounds / Voice Sounds] : lungs were clear to auscultation bilaterally [Bowel Sounds] : normal bowel sounds [Abdomen Soft] : soft [Abdomen Tenderness] : non-tender [Abnormal Walk] : normal gait [Nail Clubbing] : no clubbing of the fingernails [Cyanosis, Localized] : no localized cyanosis [Skin Color & Pigmentation] : normal skin color and pigmentation [Skin Turgor] : normal skin turgor [Oriented To Time, Place, And Person] : oriented to person, place, and time [Impaired Insight] : insight and judgment were intact [Affect] : the affect was normal

## 2019-06-24 NOTE — REVIEW OF SYSTEMS
[Recent Weight Gain (___ Lbs)] : no recent weight gain [Feeling Fatigued] : not feeling fatigued [Recent Weight Loss (___ Lbs)] : no recent weight loss [Blurry Vision] : no blurred vision [Seeing Double (Diplopia)] : no diplopia [Earache] : no earache [Discharge From The Ears] : no discharge from the ears [Dyspnea on exertion] : not dyspnea during exertion [Shortness Of Breath] : no shortness of breath [Chest Pain] : no chest pain [Lower Ext Edema] : lower extremity edema [Palpitations] : no palpitations [Wheezing] : no wheezing [Abdominal Pain] : no abdominal pain [Dysuria] : no dysuria [Nausea] : no nausea [Pelvic Pain] : no pelvic pain [Joint Pain] : no joint pain [Muscle Cramps] : no muscle cramps [Skin: A Rash] : no rash: [Skin Lesions] : no skin lesions [Dizziness] : no dizziness [Convulsions] : no convulsions [Confusion] : no confusion was observed [Anxiety] : no anxiety [Excessive Thirst] : no polydipsia [Easy Bleeding] : no tendency for easy bleeding [Easy Bruising] : no tendency for easy bruising

## 2019-06-24 NOTE — ASSESSMENT
[FreeTextEntry1] : 74 yo female with leg edema s/p vein ablation and possible superficial thrombosis of the inner calf. She has seen the surgeon who performed the procedure multiple times. She is concerned that edema is related to a cardiac etiology. She has unilateral leg edema and has varicose veins in the left leg as well. \par I advised her to see vascular surgery. 	 will perform an echocardiogram as well. \par She knows to let me know if she develops any other symptoms. \par She will see me in a few months.

## 2019-06-25 ENCOUNTER — APPOINTMENT (OUTPATIENT)
Dept: VASCULAR SURGERY | Facility: CLINIC | Age: 74
End: 2019-06-25
Payer: MEDICARE

## 2019-06-25 PROCEDURE — 93970 EXTREMITY STUDY: CPT

## 2019-06-25 PROCEDURE — 29580 STRAPPING UNNA BOOT: CPT | Mod: RT

## 2019-07-02 NOTE — HISTORY OF PRESENT ILLNESS
[FreeTextEntry1] : 74 yo female with history of RLE reflux and venous insufficiency to BLE. She has seen 3 different vascular surgeons in the past 2 years regarding her legs. She had a RLE GSV RFA, by Dr Leyva in [place], and has increased discoloration, edema and slight tenderness to RLE. She reports skin is now hard touch in some areas. No throbbing, aching or fatigue to legs. She denies ulceration over her legs, claudication, gangrene, or rest pain.

## 2019-07-02 NOTE — REASON FOR VISIT
[Consultation] : a consultation visit [FreeTextEntry1] : venous insufficiency and discoloration to legs.

## 2019-07-02 NOTE — REVIEW OF SYSTEMS
[Lower Ext Edema] : lower extremity edema [Limb Swelling] : limb swelling [Negative] : Heme/Lymph [Leg Claudication] : no intermittent leg claudication [Limb Pain] : no limb pain

## 2019-07-02 NOTE — ASSESSMENT
[Arterial/Venous Disease] : arterial/venous disease [FreeTextEntry1] : 73 yo F with bilateral LE edema and discomfort after standing for long periods of time. U/S RLE demonstrates SVT in calf and enlarged LN in right groin, no DVT. Discussed venous insufficiency and skin changes and treatment options: Unna Boot, compression stockings, ambulation and elevation of legs. \par \par \par Plan:\par Continue with conservative measures: compression, ambulation and elevation of legs above the level of the heart at rest.\par Maintain adequate hydration. \par \par U/S Venous Duplex completed in office today.\par Will arrange for Lymphedema pumps. Pt agrees.\par RTC in 3 weeks\par \par \par \par \par

## 2019-07-02 NOTE — PHYSICAL EXAM
[2+] : left 2+ [de-identified] : WD, WN, NAD. Awake, alert, interactive. Ambulates without difficulty [de-identified] : CHARLETTE, PERHAMILTONL [de-identified] : supple [FreeTextEntry1] : Chronic venous stasis hyperpigmentation BLE R>L with mild edema.\par fibrosis RLE medial aspect mid calf. There are numerous varicose veins BLE.\par No erythema. No s/s of infection. [de-identified] : JERER

## 2019-07-02 NOTE — PROCEDURE
[FreeTextEntry1] : U/S Venous Duplex BLE 6/25/19 demonstrate no DVT, or GSV. Right- There is SVT in calf and enlarged LN in groin. Left - SSV with reflux > 2 sec from proximal to mid calf with chronic changes.\par \par Unna Boot RLE

## 2019-07-30 ENCOUNTER — APPOINTMENT (OUTPATIENT)
Dept: VASCULAR SURGERY | Facility: CLINIC | Age: 74
End: 2019-07-30

## 2019-10-02 ENCOUNTER — MEDICATION RENEWAL (OUTPATIENT)
Age: 74
End: 2019-10-02

## 2019-11-26 ENCOUNTER — MEDICATION RENEWAL (OUTPATIENT)
Age: 74
End: 2019-11-26

## 2019-12-13 ENCOUNTER — APPOINTMENT (OUTPATIENT)
Dept: INTERNAL MEDICINE | Facility: CLINIC | Age: 74
End: 2019-12-13
Payer: MEDICARE

## 2019-12-13 PROCEDURE — 90662 IIV NO PRSV INCREASED AG IM: CPT

## 2019-12-13 PROCEDURE — G0008: CPT

## 2020-01-28 ENCOUNTER — APPOINTMENT (OUTPATIENT)
Dept: INTERNAL MEDICINE | Facility: CLINIC | Age: 75
End: 2020-01-28
Payer: MEDICARE

## 2020-01-28 VITALS
BODY MASS INDEX: 23.21 KG/M2 | TEMPERATURE: 98 F | SYSTOLIC BLOOD PRESSURE: 126 MMHG | HEIGHT: 63 IN | DIASTOLIC BLOOD PRESSURE: 82 MMHG | WEIGHT: 131 LBS | HEART RATE: 99 BPM | OXYGEN SATURATION: 97 %

## 2020-01-28 PROCEDURE — 99214 OFFICE O/P EST MOD 30 MIN: CPT

## 2020-01-28 NOTE — REVIEW OF SYSTEMS
[Chills] : chills [Earache] : earache [Nasal Discharge] : nasal discharge [Cough] : cough [Negative] : Heme/Lymph [Fatigue] : no fatigue [Hot Flashes] : no hot flashes [Fever] : no fever [Night Sweats] : no night sweats [Recent Change In Weight] : ~T no recent weight change [Nosebleed] : no nosebleeds [Hoarseness] : no hoarseness [Hearing Loss] : no hearing loss [Postnasal Drip] : no postnasal drip [Sore Throat] : no sore throat [Wheezing] : no wheezing [Shortness Of Breath] : no shortness of breath [FreeTextEntry4] : pressure in ears b/l, rhinorrhea with clear discharge [Dyspnea on Exertion] : no dyspnea on exertion

## 2020-01-28 NOTE — HISTORY OF PRESENT ILLNESS
[FreeTextEntry1] : "cough and runny nose" x 2 weeks  [de-identified] : Patient is a 74 year old female with a past medical history of bronchiectasis and pulmonary nodule presenting with sinus congestion, rhinorrhea and cough for the past 2 weeks. Patient states rhinorrhea originally produced green discharge, which has since been clear in the past week. Cough has been persistent for the past 2 weeks with no sputum production. Patient also complains of pressure sensation in b/l ears for the past 2 weeks, as well as intermittent chills. She has been taking OTC Claritin, Allegra, and Flonase with minimal relief. Patient traveled by plane from [place] to [place] on January 21st where she admits to potential sick contact. Patient denies fever, odynophagia, fatigue, myalgia, nausea, vomiting, diarrhea, shortness of breath, chest pain, headache. She received yearly influenza vaccination. Patient also requesting prescription for routine mammogram and ultrasound of b/l breasts as well as annual screening of pulmonary nodule via CT scan.

## 2020-01-28 NOTE — PLAN
[FreeTextEntry1] : Patient with sinusitis, will start antibiotics and intranasal steroid today. \par \par In regards to breast cancer screening, Mammogram ordered\par \par In regards to history of pulmonary nodule, patient will go to repeat CT chest. and follow up with pulmonologist. \par \par Counseling included abnormal lab results, differential diagnoses, treatment options, risks and benefits, lifestyle changes, prognosis, current condition, medications, and dose adjustments. \par The patient was interactive, attentive, asked questions, and verbalized understanding

## 2020-01-28 NOTE — PHYSICAL EXAM
[No Acute Distress] : no acute distress [Well Nourished] : well nourished [Well Developed] : well developed [Well-Appearing] : well-appearing [PERRL] : pupils equal round and reactive to light [Normal Sclera/Conjunctiva] : normal sclera/conjunctiva [EOMI] : extraocular movements intact [Normal Outer Ear/Nose] : the outer ears and nose were normal in appearance [Normal TMs] : both tympanic membranes were normal [Normal] : affect was normal and insight and judgment were intact [No Focal Deficits] : no focal deficits [Coordination Grossly Intact] : coordination grossly intact [Normal Gait] : normal gait [de-identified] : fullness of TM bilaterally, cobblestoning of pharynx maxillary sinus tenderness on leftt side

## 2020-02-11 ENCOUNTER — FORM ENCOUNTER (OUTPATIENT)
Age: 75
End: 2020-02-11

## 2020-02-12 ENCOUNTER — OUTPATIENT (OUTPATIENT)
Dept: OUTPATIENT SERVICES | Facility: HOSPITAL | Age: 75
LOS: 1 days | End: 2020-02-12
Payer: COMMERCIAL

## 2020-02-12 ENCOUNTER — APPOINTMENT (OUTPATIENT)
Dept: CT IMAGING | Facility: CLINIC | Age: 75
End: 2020-02-12
Payer: MEDICARE

## 2020-02-12 ENCOUNTER — APPOINTMENT (OUTPATIENT)
Dept: ULTRASOUND IMAGING | Facility: CLINIC | Age: 75
End: 2020-02-12
Payer: MEDICARE

## 2020-02-12 ENCOUNTER — APPOINTMENT (OUTPATIENT)
Dept: MAMMOGRAPHY | Facility: CLINIC | Age: 75
End: 2020-02-12
Payer: MEDICARE

## 2020-02-12 DIAGNOSIS — Z00.00 ENCOUNTER FOR GENERAL ADULT MEDICAL EXAMINATION WITHOUT ABNORMAL FINDINGS: ICD-10-CM

## 2020-02-12 DIAGNOSIS — R91.1 SOLITARY PULMONARY NODULE: ICD-10-CM

## 2020-02-12 PROCEDURE — 76641 ULTRASOUND BREAST COMPLETE: CPT

## 2020-02-12 PROCEDURE — 77063 BREAST TOMOSYNTHESIS BI: CPT | Mod: 26

## 2020-02-12 PROCEDURE — 71250 CT THORAX DX C-: CPT | Mod: 26

## 2020-02-12 PROCEDURE — 77063 BREAST TOMOSYNTHESIS BI: CPT

## 2020-02-12 PROCEDURE — 77067 SCR MAMMO BI INCL CAD: CPT | Mod: 26

## 2020-02-12 PROCEDURE — 71250 CT THORAX DX C-: CPT

## 2020-02-12 PROCEDURE — 76641 ULTRASOUND BREAST COMPLETE: CPT | Mod: 26,50

## 2020-02-12 PROCEDURE — 77067 SCR MAMMO BI INCL CAD: CPT

## 2020-02-23 ENCOUNTER — FORM ENCOUNTER (OUTPATIENT)
Age: 75
End: 2020-02-23

## 2020-02-24 ENCOUNTER — APPOINTMENT (OUTPATIENT)
Dept: MAMMOGRAPHY | Facility: CLINIC | Age: 75
End: 2020-02-24
Payer: MEDICARE

## 2020-02-24 ENCOUNTER — APPOINTMENT (OUTPATIENT)
Dept: ULTRASOUND IMAGING | Facility: CLINIC | Age: 75
End: 2020-02-24
Payer: MEDICARE

## 2020-02-24 ENCOUNTER — OUTPATIENT (OUTPATIENT)
Dept: OUTPATIENT SERVICES | Facility: HOSPITAL | Age: 75
LOS: 1 days | End: 2020-02-24
Payer: COMMERCIAL

## 2020-02-24 DIAGNOSIS — R92.8 OTHER ABNORMAL AND INCONCLUSIVE FINDINGS ON DIAGNOSTIC IMAGING OF BREAST: ICD-10-CM

## 2020-02-24 PROCEDURE — 77065 DX MAMMO INCL CAD UNI: CPT

## 2020-02-24 PROCEDURE — 76642 ULTRASOUND BREAST LIMITED: CPT | Mod: 26,RT

## 2020-02-24 PROCEDURE — 77065 DX MAMMO INCL CAD UNI: CPT | Mod: 26,RT

## 2020-02-24 PROCEDURE — 76642 ULTRASOUND BREAST LIMITED: CPT

## 2020-03-03 ENCOUNTER — FORM ENCOUNTER (OUTPATIENT)
Age: 75
End: 2020-03-03

## 2020-03-04 ENCOUNTER — APPOINTMENT (OUTPATIENT)
Dept: ULTRASOUND IMAGING | Facility: CLINIC | Age: 75
End: 2020-03-04
Payer: MEDICARE

## 2020-03-04 ENCOUNTER — OUTPATIENT (OUTPATIENT)
Dept: OUTPATIENT SERVICES | Facility: HOSPITAL | Age: 75
LOS: 1 days | End: 2020-03-04
Payer: MEDICARE

## 2020-03-04 ENCOUNTER — RESULT REVIEW (OUTPATIENT)
Age: 75
End: 2020-03-04

## 2020-03-04 DIAGNOSIS — N64.89 OTHER SPECIFIED DISORDERS OF BREAST: ICD-10-CM

## 2020-03-04 PROCEDURE — 77065 DX MAMMO INCL CAD UNI: CPT | Mod: 26,RT

## 2020-03-04 PROCEDURE — 19083 BX BREAST 1ST LESION US IMAG: CPT | Mod: RT

## 2020-03-04 PROCEDURE — A4648: CPT

## 2020-03-04 PROCEDURE — 88305 TISSUE EXAM BY PATHOLOGIST: CPT

## 2020-03-04 PROCEDURE — 77065 DX MAMMO INCL CAD UNI: CPT

## 2020-03-04 PROCEDURE — 88305 TISSUE EXAM BY PATHOLOGIST: CPT | Mod: 26

## 2020-03-04 PROCEDURE — 19083 BX BREAST 1ST LESION US IMAG: CPT

## 2020-05-04 ENCOUNTER — TRANSCRIPTION ENCOUNTER (OUTPATIENT)
Age: 75
End: 2020-05-04

## 2020-05-06 ENCOUNTER — APPOINTMENT (OUTPATIENT)
Dept: PULMONOLOGY | Facility: CLINIC | Age: 75
End: 2020-05-06

## 2020-06-03 ENCOUNTER — APPOINTMENT (OUTPATIENT)
Dept: INTERNAL MEDICINE | Facility: CLINIC | Age: 75
End: 2020-06-03
Payer: MEDICARE

## 2020-06-03 ENCOUNTER — NON-APPOINTMENT (OUTPATIENT)
Age: 75
End: 2020-06-03

## 2020-06-03 VITALS — DIASTOLIC BLOOD PRESSURE: 70 MMHG | SYSTOLIC BLOOD PRESSURE: 120 MMHG

## 2020-06-03 VITALS
WEIGHT: 131 LBS | HEIGHT: 63 IN | TEMPERATURE: 98.3 F | DIASTOLIC BLOOD PRESSURE: 70 MMHG | SYSTOLIC BLOOD PRESSURE: 130 MMHG | BODY MASS INDEX: 23.21 KG/M2

## 2020-06-03 DIAGNOSIS — Z11.59 ENCOUNTER FOR SCREENING FOR OTHER VIRAL DISEASES: ICD-10-CM

## 2020-06-03 DIAGNOSIS — Z23 ENCOUNTER FOR IMMUNIZATION: ICD-10-CM

## 2020-06-03 DIAGNOSIS — Z12.39 ENCOUNTER FOR OTHER SCREENING FOR MALIGNANT NEOPLASM OF BREAST: ICD-10-CM

## 2020-06-03 PROCEDURE — 93000 ELECTROCARDIOGRAM COMPLETE: CPT

## 2020-06-03 PROCEDURE — 90471 IMMUNIZATION ADMIN: CPT

## 2020-06-03 PROCEDURE — 90750 HZV VACC RECOMBINANT IM: CPT

## 2020-06-03 PROCEDURE — 86580 TB INTRADERMAL TEST: CPT

## 2020-06-03 PROCEDURE — 99397 PER PM REEVAL EST PAT 65+ YR: CPT | Mod: 25

## 2020-06-03 NOTE — HISTORY OF PRESENT ILLNESS
[FreeTextEntry1] : ANNUAL EXAM [de-identified] : 73YO FEMALE  WITH HX OF BRONCHIECTASIS HLD KYPHOSIS ADD  AND FEELS OK NO MAJOR COMPLAINTS LOWE R EXT SAISIS CHANGE BTHER HER

## 2020-06-03 NOTE — PHYSICAL EXAM
[No Acute Distress] : no acute distress [Well Nourished] : well nourished [Well Developed] : well developed [Well-Appearing] : well-appearing [Normal Sclera/Conjunctiva] : normal sclera/conjunctiva [PERRL] : pupils equal round and reactive to light [EOMI] : extraocular movements intact [Normal Outer Ear/Nose] : the outer ears and nose were normal in appearance [Normal Oropharynx] : the oropharynx was normal [No JVD] : no jugular venous distention [No Lymphadenopathy] : no lymphadenopathy [Supple] : supple [Thyroid Normal, No Nodules] : the thyroid was normal and there were no nodules present [No Respiratory Distress] : no respiratory distress  [No Accessory Muscle Use] : no accessory muscle use [Clear to Auscultation] : lungs were clear to auscultation bilaterally [Regular Rhythm] : with a regular rhythm [Normal Rate] : normal rate  [No Murmur] : no murmur heard [Normal S1, S2] : normal S1 and S2 [No Carotid Bruits] : no carotid bruits [No Abdominal Bruit] : a ~M bruit was not heard ~T in the abdomen [No Varicosities] : no varicosities [Pedal Pulses Present] : the pedal pulses are present [No Edema] : there was no peripheral edema [No Extremity Clubbing/Cyanosis] : no extremity clubbing/cyanosis [No Palpable Aorta] : no palpable aorta [Non Tender] : non-tender [Soft] : abdomen soft [Non-distended] : non-distended [No Masses] : no abdominal mass palpated [No HSM] : no HSM [Normal Bowel Sounds] : normal bowel sounds [Normal Posterior Cervical Nodes] : no posterior cervical lymphadenopathy [Normal Anterior Cervical Nodes] : no anterior cervical lymphadenopathy [No Spinal Tenderness] : no spinal tenderness [No CVA Tenderness] : no CVA  tenderness [No Joint Swelling] : no joint swelling [Grossly Normal Strength/Tone] : grossly normal strength/tone [No Rash] : no rash [No Focal Deficits] : no focal deficits [Coordination Grossly Intact] : coordination grossly intact [Normal Gait] : normal gait [Deep Tendon Reflexes (DTR)] : deep tendon reflexes were 2+ and symmetric [Normal Affect] : the affect was normal [Normal Insight/Judgement] : insight and judgment were intact

## 2020-06-03 NOTE — PLAN
[FreeTextEntry1] :  75YO FEMALE  WITH HX OF BRONCHIECTASIS HLD KYPHOSIS ADD  AND FEELS OK NO MAJOR COMPLAINTS LOWE R EXT STASIS CHANGE BOTHER HER\par HAS BRONCHIECTASIS SO CHRONIC COUGH FOLLOW WITH CT SURGERY AND PULMONARY \par HAS ADD ON ADDERALL\par USES LASIX PRN FOR LEG EDEMA\par WILL CHECK YEARLY LABS \par EKG DONE  REVIEWED WITH PT NO CHANGE\par SHINGRIX FIRST SHOT GIVEN FOLLOW 2-6 MONTHS\par OVERALL STABLE\par \par

## 2020-06-04 LAB
25(OH)D3 SERPL-MCNC: 51.2 NG/ML
ALBUMIN SERPL ELPH-MCNC: 4.6 G/DL
ALP BLD-CCNC: 87 U/L
ALT SERPL-CCNC: 18 U/L
ANION GAP SERPL CALC-SCNC: 13 MMOL/L
APPEARANCE: CLEAR
AST SERPL-CCNC: 18 U/L
BASOPHILS # BLD AUTO: 0.05 K/UL
BASOPHILS NFR BLD AUTO: 0.8 %
BILIRUB SERPL-MCNC: 0.5 MG/DL
BILIRUBIN URINE: NEGATIVE
BLOOD URINE: NEGATIVE
BUN SERPL-MCNC: 16 MG/DL
CALCIUM SERPL-MCNC: 9.9 MG/DL
CHLORIDE SERPL-SCNC: 104 MMOL/L
CHOLEST SERPL-MCNC: 178 MG/DL
CHOLEST/HDLC SERPL: 2.3 RATIO
CO2 SERPL-SCNC: 24 MMOL/L
COLOR: COLORLESS
CREAT SERPL-MCNC: 0.6 MG/DL
EOSINOPHIL # BLD AUTO: 0.09 K/UL
EOSINOPHIL NFR BLD AUTO: 1.5 %
ESTIMATED AVERAGE GLUCOSE: 108 MG/DL
GLUCOSE QUALITATIVE U: NEGATIVE
GLUCOSE SERPL-MCNC: 97 MG/DL
HBA1C MFR BLD HPLC: 5.4 %
HCT VFR BLD CALC: 43.9 %
HDLC SERPL-MCNC: 78 MG/DL
HGB BLD-MCNC: 13.5 G/DL
IMM GRANULOCYTES NFR BLD AUTO: 0.3 %
KETONES URINE: NEGATIVE
LDLC SERPL CALC-MCNC: 91 MG/DL
LEUKOCYTE ESTERASE URINE: NEGATIVE
LYMPHOCYTES # BLD AUTO: 2.34 K/UL
LYMPHOCYTES NFR BLD AUTO: 37.7 %
MAN DIFF?: NORMAL
MCHC RBC-ENTMCNC: 29.9 PG
MCHC RBC-ENTMCNC: 30.8 GM/DL
MCV RBC AUTO: 97.3 FL
MONOCYTES # BLD AUTO: 0.4 K/UL
MONOCYTES NFR BLD AUTO: 6.5 %
NEUTROPHILS # BLD AUTO: 3.3 K/UL
NEUTROPHILS NFR BLD AUTO: 53.2 %
NITRITE URINE: NEGATIVE
PH URINE: 7
PLATELET # BLD AUTO: 257 K/UL
POTASSIUM SERPL-SCNC: 5.4 MMOL/L
PROT SERPL-MCNC: 6.6 G/DL
PROTEIN URINE: NEGATIVE
RBC # BLD: 4.51 M/UL
RBC # FLD: 14 %
SARS-COV-2 IGG SERPL IA-ACNC: <0.1 INDEX
SARS-COV-2 IGG SERPL QL IA: NEGATIVE
SODIUM SERPL-SCNC: 142 MMOL/L
SPECIFIC GRAVITY URINE: 1
T4 SERPL-MCNC: 7.7 UG/DL
TRIGL SERPL-MCNC: 49 MG/DL
TSH SERPL-ACNC: 2.9 UIU/ML
UROBILINOGEN URINE: NORMAL
WBC # FLD AUTO: 6.2 K/UL

## 2020-06-08 LAB — ACE BLD-CCNC: 38 U/L

## 2020-06-23 ENCOUNTER — APPOINTMENT (OUTPATIENT)
Dept: CT IMAGING | Facility: CLINIC | Age: 75
End: 2020-06-23

## 2020-06-23 ENCOUNTER — OUTPATIENT (OUTPATIENT)
Dept: OUTPATIENT SERVICES | Facility: HOSPITAL | Age: 75
LOS: 1 days | End: 2020-06-23

## 2020-06-23 DIAGNOSIS — R91.8 OTHER NONSPECIFIC ABNORMAL FINDING OF LUNG FIELD: ICD-10-CM

## 2020-06-23 DIAGNOSIS — Z00.00 ENCOUNTER FOR GENERAL ADULT MEDICAL EXAMINATION WITHOUT ABNORMAL FINDINGS: ICD-10-CM

## 2020-06-30 DIAGNOSIS — M81.0 AGE-RELATED OSTEOPOROSIS W/OUT CURRENT PATHOLOGICAL FRACTURE: ICD-10-CM

## 2020-07-09 ENCOUNTER — APPOINTMENT (OUTPATIENT)
Dept: PULMONOLOGY | Facility: CLINIC | Age: 75
End: 2020-07-09
Payer: MEDICARE

## 2020-07-09 VITALS — DIASTOLIC BLOOD PRESSURE: 66 MMHG | HEART RATE: 89 BPM | OXYGEN SATURATION: 98 % | SYSTOLIC BLOOD PRESSURE: 110 MMHG

## 2020-07-09 PROCEDURE — 99215 OFFICE O/P EST HI 40 MIN: CPT

## 2020-07-09 RX ORDER — BENZONATATE 200 MG/1
200 CAPSULE ORAL 3 TIMES DAILY
Qty: 30 | Refills: 0 | Status: DISCONTINUED | COMMUNITY
Start: 2019-05-29 | End: 2020-07-09

## 2020-07-09 RX ORDER — PREDNISONE 20 MG/1
20 TABLET ORAL DAILY
Qty: 10 | Refills: 0 | Status: DISCONTINUED | COMMUNITY
Start: 2020-02-20 | End: 2020-07-09

## 2020-07-09 RX ORDER — DOXYCYCLINE HYCLATE 100 MG/1
100 CAPSULE ORAL
Qty: 20 | Refills: 0 | Status: DISCONTINUED | COMMUNITY
Start: 2020-01-28 | End: 2020-07-09

## 2020-07-09 RX ORDER — BUTALBITAL, ASPIRIN, AND CAFFEINE 50; 325; 40 MG/1; MG/1; MG/1
CAPSULE ORAL
Refills: 0 | Status: DISCONTINUED | COMMUNITY
End: 2020-07-09

## 2020-07-09 RX ORDER — DIPHENOXYLATE HYDROCHLORIDE AND ATROPINE SULFATE 2.5; .025 MG/1; MG/1
TABLET ORAL
Refills: 0 | Status: DISCONTINUED | COMMUNITY
End: 2020-07-09

## 2020-07-09 RX ORDER — ZOLPIDEM TARTRATE 10 MG/1
10 TABLET, FILM COATED ORAL
Refills: 0 | Status: DISCONTINUED | COMMUNITY
End: 2020-07-09

## 2020-07-09 RX ORDER — FUROSEMIDE 20 MG/1
20 TABLET ORAL
Qty: 90 | Refills: 3 | Status: DISCONTINUED | COMMUNITY
Start: 2019-05-29 | End: 2020-07-09

## 2020-07-09 RX ORDER — MELOXICAM 15 MG/1
15 TABLET ORAL
Qty: 90 | Refills: 1 | Status: ACTIVE | COMMUNITY
Start: 2019-05-29

## 2020-07-09 NOTE — HISTORY OF PRESENT ILLNESS
[TextBox_4] : at baseline\par no new pulmonary complaints\par no fever, chill, chest pain\par no sig dyspnea\par has intermittent dry cough, has sig reflux and occasional post nasal drip

## 2020-07-09 NOTE — PHYSICAL EXAM
[General Appearance - Well Developed] : well developed [General Appearance - Well Nourished] : well nourished [Normal Conjunctiva] : the conjunctiva exhibited no abnormalities [II] : II [Normal Oropharynx] : normal oropharynx [Neck Appearance] : the appearance of the neck was normal [Murmurs] : no murmurs present [Heart Sounds] : normal S1 and S2 [Heart Rate And Rhythm] : heart rate and rhythm were normal [Arterial Pulses Normal] : the arterial pulses were normal [Edema] : no peripheral edema present [Auscultation Breath Sounds / Voice Sounds] : lungs were clear to auscultation bilaterally [Exaggerated Use Of Accessory Muscles For Inspiration] : no accessory muscle use [Respiration, Rhythm And Depth] : normal respiratory rhythm and effort [Lungs Percussion] : the lungs were normal to percussion [Nail Clubbing] : no clubbing of the fingernails [Abnormal Walk] : normal gait [Cyanosis, Localized] : no localized cyanosis [Oriented To Time, Place, And Person] : oriented to person, place, and time [No Focal Deficits] : no focal deficits [Impaired Insight] : insight and judgment were intact [Affect] : the affect was normal [] : no rash [Memory Recent] : recent memory was not impaired [FreeTextEntry1] : no chest wall abn

## 2020-07-09 NOTE — DISCUSSION/SUMMARY
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

## 2020-07-09 NOTE — REASON FOR VISIT
[Follow-Up] : a follow-up visit [Abnormal CT Scan] : abnormal CT Scan [FreeTextEntry2] : lung nodules

## 2020-07-09 NOTE — CONSULT LETTER
[Dear  ___] : Dear  [unfilled], [Consult Letter:] : I had the pleasure of evaluating your patient, [unfilled]. [Please see my note below.] : Please see my note below. [Sincerely,] : Sincerely, [Consult Closing:] : Thank you very much for allowing me to participate in the care of this patient.  If you have any questions, please do not hesitate to contact me. [DrGely  ___] : Dr. BRYAN [[facility], [facility]] : Ho Shield[facility], [facility] [[facility]] : [facility] [Director, Respiratory Care] : Director, Respiratory Care [FreeTextEntry3] : [facility]\par Pulmonary Critical Care\par Director Pulmonary Division\par Medical Director Respiratory Therapy\par [facility]\par \par

## 2020-07-09 NOTE — PROCEDURE
[FreeTextEntry1] : spirometry with normal flows, very mild obstruction, no change from 2017\par CT chest 7/20: resolving RML infiltrate , residual atelectasis\par stable JUSTIN GG nodule with pleural thickening stable from 2016

## 2020-10-09 ENCOUNTER — APPOINTMENT (OUTPATIENT)
Dept: DERMATOLOGY | Facility: CLINIC | Age: 75
End: 2020-10-09
Payer: MEDICARE

## 2020-10-09 PROCEDURE — 17000 DESTRUCT PREMALG LESION: CPT

## 2020-10-09 PROCEDURE — 99214 OFFICE O/P EST MOD 30 MIN: CPT | Mod: 25

## 2020-10-26 ENCOUNTER — APPOINTMENT (OUTPATIENT)
Dept: INTERNAL MEDICINE | Facility: CLINIC | Age: 75
End: 2020-10-26
Payer: MEDICARE

## 2020-10-26 DIAGNOSIS — E87.5 HYPERKALEMIA: ICD-10-CM

## 2020-10-26 PROCEDURE — 36415 COLL VENOUS BLD VENIPUNCTURE: CPT

## 2020-10-26 PROCEDURE — 99214 OFFICE O/P EST MOD 30 MIN: CPT | Mod: 25

## 2020-10-26 PROCEDURE — 99072 ADDL SUPL MATRL&STAF TM PHE: CPT

## 2020-10-26 NOTE — HISTORY OF PRESENT ILLNESS
[FreeTextEntry1] : FOLLOW UP ON BP REFULX SX  [de-identified] : 75YO FEMALE  WITH HX OF BRONCHIECTASIS HLD KYPHOSIS ADD  AND FEELS OK NO MAJOR COMPLAINTS HAS REFLUX SX \par POST NASAL DRIP

## 2020-10-26 NOTE — PLAN
[FreeTextEntry1] :  73YO FEMALE  WITH HX OF BRONCHIECTASIS HLD KYPHOSIS ADD  AND FEELS OK NO MAJOR COMPLAINTS LOWE R EXT STASIS CHANGE BOTHER HER\par HAS BRONCHIECTASIS SO CHRONIC COUGH FOLLOW WITH CT SURGERY AND PULMONARY \par HAS ADD ON ADDERALL\par  HAS PND AND HAS REFULX SX WILL RX NEXIUM \par FLU SHOT GIVEN\par  NEEDS SECOND SHINGRIX  SHOT\par \par

## 2020-11-15 LAB
25(OH)D3 SERPL-MCNC: 62.9 NG/ML
ALBUMIN SERPL ELPH-MCNC: 4.4 G/DL
ALP BLD-CCNC: 103 U/L
ALT SERPL-CCNC: 33 U/L
ANION GAP SERPL CALC-SCNC: 12 MMOL/L
AST SERPL-CCNC: 24 U/L
BILIRUB SERPL-MCNC: 0.4 MG/DL
BUN SERPL-MCNC: 14 MG/DL
CALCIUM SERPL-MCNC: 9.6 MG/DL
CHLORIDE SERPL-SCNC: 104 MMOL/L
CHOLEST SERPL-MCNC: 209 MG/DL
CO2 SERPL-SCNC: 27 MMOL/L
CREAT SERPL-MCNC: 0.52 MG/DL
ESTIMATED AVERAGE GLUCOSE: 105 MG/DL
GLUCOSE SERPL-MCNC: 69 MG/DL
HBA1C MFR BLD HPLC: 5.3 %
HDLC SERPL-MCNC: 78 MG/DL
LDLC SERPL CALC-MCNC: 112 MG/DL
NONHDLC SERPL-MCNC: 131 MG/DL
POTASSIUM SERPL-SCNC: 5.3 MMOL/L
PROT SERPL-MCNC: 6 G/DL
SODIUM SERPL-SCNC: 143 MMOL/L
TRIGL SERPL-MCNC: 94 MG/DL

## 2020-12-21 PROBLEM — J06.9 URI WITH COUGH AND CONGESTION: Status: RESOLVED | Noted: 2019-01-30 | Resolved: 2020-12-21

## 2021-05-01 ENCOUNTER — TRANSCRIPTION ENCOUNTER (OUTPATIENT)
Age: 76
End: 2021-05-01

## 2021-05-12 ENCOUNTER — APPOINTMENT (OUTPATIENT)
Dept: INTERNAL MEDICINE | Facility: CLINIC | Age: 76
End: 2021-05-12
Payer: MEDICARE

## 2021-05-12 VITALS
DIASTOLIC BLOOD PRESSURE: 60 MMHG | TEMPERATURE: 97.2 F | HEIGHT: 63 IN | WEIGHT: 133 LBS | SYSTOLIC BLOOD PRESSURE: 120 MMHG | OXYGEN SATURATION: 95 % | BODY MASS INDEX: 23.57 KG/M2

## 2021-05-12 DIAGNOSIS — J18.9 PNEUMONIA, UNSPECIFIED ORGANISM: ICD-10-CM

## 2021-05-12 PROCEDURE — 99215 OFFICE O/P EST HI 40 MIN: CPT

## 2021-05-12 NOTE — HISTORY OF PRESENT ILLNESS
[FreeTextEntry1] : FOLLOW UP ON BP AND PNEUMONIA [de-identified] : 75YO FEMALE  WITH HX OF BRONCHIECTASIS HLD KYPHOSIS ADD  AND FEELS OK NO MAJOR COMPLAINTS HAS REFLUX SX \par POST NASAL DRIP WNET TO URGENT CARE FOR COIUGH AND WAS GIVEN DOXYCYLINE FOR PNEUMONIA DIAGNOSED ON CXR \par HERE FOR FOLLOWUP FEELS BETTER NO FEVERS HAS  CONTINUED COUGH  AND FATIGUE

## 2021-05-12 NOTE — PLAN
[FreeTextEntry1] : 73YO FEMALE  WITH HX OF BRONCHIECTASIS HLD KYPHOSIS ADD  AND FEELS OK NO MAJOR COMPLAINTS HAS REFLUX SX \par POST NASAL DRIP WNET TO URGENT CARE FOR COIUGH AND WAS GIVEN DOXYCYLINE FOR PNEUMONIA DIAGNOSED ON CXR \par HERE FOR FOLLOWUP FEELS BETTER NO FEVERS HAS  CONTINUED COUGH  AND FATIGUE\par WILL RECOMMEND RPEAT CXR  WEEKS TO DEMOSTRTE CLEARING OF  PNEUMONIA SUGGEST DOING IT AT [facility] SO IT CAN BE COMPARED [PT HAS KNOWN ABNL CT SCAN OF LUNGS BIT THIS HAS BEEN ON THE LEFT  SIDE\par WILL FOLLOWUP WILL FOLLOWUP IN 2 WEEKS HAS NEVER HAD 2ND SHINGLES SHOT ALTHOUGH IT IS PAST THE USUAL INTERVAL THE GUIDELINES RECOMMEND NOT TO START OVER WILL GIVE 2ND SHOT NEXT TIME \par WILL GIVE RX FOR RPEAT XRAY AT THAT VISIT

## 2021-05-26 ENCOUNTER — APPOINTMENT (OUTPATIENT)
Dept: INTERNAL MEDICINE | Facility: CLINIC | Age: 76
End: 2021-05-26
Payer: MEDICARE

## 2021-05-26 VITALS
HEIGHT: 63 IN | SYSTOLIC BLOOD PRESSURE: 120 MMHG | WEIGHT: 130 LBS | TEMPERATURE: 96.8 F | BODY MASS INDEX: 23.04 KG/M2 | DIASTOLIC BLOOD PRESSURE: 65 MMHG

## 2021-05-26 DIAGNOSIS — Z00.00 ENCOUNTER FOR GENERAL ADULT MEDICAL EXAMINATION W/OUT ABNORMAL FINDINGS: ICD-10-CM

## 2021-05-26 PROCEDURE — 90471 IMMUNIZATION ADMIN: CPT

## 2021-05-26 PROCEDURE — 90750 HZV VACC RECOMBINANT IM: CPT | Mod: GY

## 2021-05-26 PROCEDURE — 36415 COLL VENOUS BLD VENIPUNCTURE: CPT

## 2021-05-26 PROCEDURE — 99215 OFFICE O/P EST HI 40 MIN: CPT | Mod: 25

## 2021-05-27 ENCOUNTER — APPOINTMENT (OUTPATIENT)
Dept: RADIOLOGY | Facility: CLINIC | Age: 76
End: 2021-05-27
Payer: MEDICARE

## 2021-05-27 ENCOUNTER — OUTPATIENT (OUTPATIENT)
Dept: OUTPATIENT SERVICES | Facility: HOSPITAL | Age: 76
LOS: 1 days | End: 2021-05-27
Payer: MEDICARE

## 2021-05-27 DIAGNOSIS — R05 COUGH: ICD-10-CM

## 2021-05-27 PROCEDURE — 71046 X-RAY EXAM CHEST 2 VIEWS: CPT

## 2021-05-27 PROCEDURE — 71046 X-RAY EXAM CHEST 2 VIEWS: CPT | Mod: 26

## 2021-05-27 NOTE — HISTORY OF PRESENT ILLNESS
[FreeTextEntry1] : FATIGUE [de-identified] : 76YO FEMALE  WITH HX OF BRONCHIECTASIS HLD KYPHOSIS ADD  AND FEELS OKBUT WITH FATIGEU \par   WNET TO URGENT CARE FOR COIUGH AND WAS GIVEN DOXYCYLINE FOR PNEUMONIA DIAGNOSED ON CXR \par HERE FOR FOLLOWUP FEELS BETTER NO FEVERS HAS  CONTINUED   FATIGUE\par PT LASO UNDER A LOT OF STRESS AS DAUGHTER LIVES IN [facility] BUTHSE CANT GET IN TOUCH WITH HER ALSO HERE FOR SECOND SHNGLE VACCINE

## 2021-05-27 NOTE — REVIEW OF SYSTEMS
[Fatigue] : fatigue [Cough] : cough [Diarrhea] : diarrhea [Heartburn] : heartburn [Negative] : Heme/Lymph

## 2021-05-27 NOTE — PLAN
[FreeTextEntry1] : 76YO FEMALE  WITH HX OF BRONCHIECTASIS HLD KYPHOSIS ADD  AND FEELS OKBUT WITH FATIGEU \par   WNET TO URGENT CARE FOR COIUGH AND WAS GIVEN DOXYCYLINE FOR PNEUMONIA DIAGNOSED ON CXR \par HERE FOR FOLLOWUP FEELS BETTER NO FEVERS HAS  CONTINUED   FATIGUE\par PT LASO UNDER A LOT OF STRESS AS DAUGHTER LIVES IN [place] BUT CANT GET IN TOUCH WITH HER\par PT ALSO WITH CHRONIC DIARRHEA USES OCCASIONAL LOMOITLBUT RX A THOE FORM 2016\par WILL RX LOMOTIL\par ADD RENEW ADDERALL\par PT WITH CHINGRIX FIRST DOSE GIVEN LAST YEAR IT HAS BEEN MORE THAN 6 MONTHS BUT FRANCESCA DO NOT RECCOMEND STARTING OVER WILL RX SHINGRIX AGAIN \par GIVEN TO LEFT ARM \par WILL OFLLOWUP CXR AND SEE DR RAMIREZ\par PT WITH FATIGUE WILL CHEK LABS

## 2021-06-03 ENCOUNTER — APPOINTMENT (OUTPATIENT)
Dept: PULMONOLOGY | Facility: CLINIC | Age: 76
End: 2021-06-03
Payer: MEDICARE

## 2021-06-03 VITALS
WEIGHT: 127 LBS | DIASTOLIC BLOOD PRESSURE: 70 MMHG | OXYGEN SATURATION: 99 % | BODY MASS INDEX: 22.5 KG/M2 | HEART RATE: 91 BPM | SYSTOLIC BLOOD PRESSURE: 120 MMHG | RESPIRATION RATE: 16 BRPM

## 2021-06-03 LAB
25(OH)D3 SERPL-MCNC: 48.1 NG/ML
ALBUMIN SERPL ELPH-MCNC: 4.4 G/DL
ALP BLD-CCNC: 95 U/L
ALT SERPL-CCNC: 16 U/L
ANION GAP SERPL CALC-SCNC: 10 MMOL/L
AST SERPL-CCNC: 17 U/L
BASOPHILS # BLD AUTO: 0.03 K/UL
BASOPHILS NFR BLD AUTO: 0.6 %
BILIRUB SERPL-MCNC: 0.3 MG/DL
BUN SERPL-MCNC: 12 MG/DL
CALCIUM SERPL-MCNC: 9.5 MG/DL
CHLORIDE SERPL-SCNC: 107 MMOL/L
CHOLEST SERPL-MCNC: 186 MG/DL
CO2 SERPL-SCNC: 25 MMOL/L
CREAT SERPL-MCNC: 0.53 MG/DL
EOSINOPHIL # BLD AUTO: 0.12 K/UL
EOSINOPHIL NFR BLD AUTO: 2.3 %
ESTIMATED AVERAGE GLUCOSE: 114 MG/DL
GLUCOSE SERPL-MCNC: 96 MG/DL
HBA1C MFR BLD HPLC: 5.6 %
HCT VFR BLD CALC: 40.9 %
HDLC SERPL-MCNC: 82 MG/DL
HGB BLD-MCNC: 12.9 G/DL
IMM GRANULOCYTES NFR BLD AUTO: 0.2 %
LDLC SERPL CALC-MCNC: 94 MG/DL
LYMPHOCYTES # BLD AUTO: 2.07 K/UL
LYMPHOCYTES NFR BLD AUTO: 40.1 %
MAN DIFF?: NORMAL
MCHC RBC-ENTMCNC: 30.4 PG
MCHC RBC-ENTMCNC: 31.5 GM/DL
MCV RBC AUTO: 96.5 FL
MONOCYTES # BLD AUTO: 0.32 K/UL
MONOCYTES NFR BLD AUTO: 6.2 %
NEUTROPHILS # BLD AUTO: 2.61 K/UL
NEUTROPHILS NFR BLD AUTO: 50.6 %
NONHDLC SERPL-MCNC: 104 MG/DL
PLATELET # BLD AUTO: 275 K/UL
POTASSIUM SERPL-SCNC: 5.5 MMOL/L
PROT SERPL-MCNC: 6 G/DL
RBC # BLD: 4.24 M/UL
RBC # FLD: 14.3 %
SODIUM SERPL-SCNC: 143 MMOL/L
T4 SERPL-MCNC: 7.5 UG/DL
TRIGL SERPL-MCNC: 49 MG/DL
TSH SERPL-ACNC: 3.36 UIU/ML
WBC # FLD AUTO: 5.16 K/UL

## 2021-06-03 PROCEDURE — 99213 OFFICE O/P EST LOW 20 MIN: CPT

## 2021-06-03 RX ORDER — ALBUTEROL SULFATE 90 UG/1
108 (90 BASE) INHALANT RESPIRATORY (INHALATION)
Qty: 1 | Refills: 5 | Status: ACTIVE | COMMUNITY
Start: 2021-06-03 | End: 1900-01-01

## 2021-06-03 NOTE — CONSULT LETTER
[Dear  ___] : Dear  [unfilled], [Consult Letter:] : I had the pleasure of evaluating your patient, [unfilled]. [Please see my note below.] : Please see my note below. [Consult Closing:] : Thank you very much for allowing me to participate in the care of this patient.  If you have any questions, please do not hesitate to contact me. [Sincerely,] : Sincerely, [DrGely  ___] : Dr. BRYAN [Director, Respiratory Care] : Director, Respiratory Care [[facility], [facility]] : [facility], [facility] [[facility]] : [facility] [FreeTextEntry3] : [facility]\par Pulmonary Critical Care\par Director Pulmonary Division\par Medical Director Respiratory Therapy\par [facility]\par \par

## 2021-06-03 NOTE — DISCUSSION/SUMMARY
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, stable x 4 yrs, she saw T Surgery Dr Boogie 2018\par plan was continued follow up, she does not want T surg input currently\par Now clinical improving exacerbation and decreasing R mid lung zone infiltrate\par Lung exam is normal, normal sp02\par Needs follow up CT scan given hx\par PFts, prn  albuterol e scribed and technique reveiwed, has home nebulizer, doesn’t want nebs currently\par empiric anti reflux , avoiding late night eating, HOB elevated, and anti reflux reviewed\par normal IgG levels in past\par 2-3  months or sooner if needed \par vaccinations up ro date, including covid\par \par

## 2021-06-03 NOTE — PHYSICAL EXAM
[General Appearance - Well Developed] : well developed [General Appearance - Well Nourished] : well nourished [Normal Conjunctiva] : the conjunctiva exhibited no abnormalities [Normal Oropharynx] : normal oropharynx [II] : II [Neck Appearance] : the appearance of the neck was normal [Heart Rate And Rhythm] : heart rate and rhythm were normal [Heart Sounds] : normal S1 and S2 [Murmurs] : no murmurs present [Arterial Pulses Normal] : the arterial pulses were normal [Edema] : no peripheral edema present [Respiration, Rhythm And Depth] : normal respiratory rhythm and effort [Exaggerated Use Of Accessory Muscles For Inspiration] : no accessory muscle use [Lungs Percussion] : the lungs were normal to percussion [Auscultation Breath Sounds / Voice Sounds] : lungs were clear to auscultation bilaterally [Abnormal Walk] : normal gait [Nail Clubbing] : no clubbing of the fingernails [Cyanosis, Localized] : no localized cyanosis [No Focal Deficits] : no focal deficits [Oriented To Time, Place, And Person] : oriented to person, place, and time [Impaired Insight] : insight and judgment were intact [Affect] : the affect was normal [Memory Recent] : recent memory was not impaired [] : no rash [FreeTextEntry1] : no chest wall abn

## 2021-06-03 NOTE — HISTORY OF PRESENT ILLNESS
[TextBox_4] : recently saw urgent care for worsening cough\par cough improving\par no fever, chill, chest pain\par sputum decreasing\par no sig dyspnea\par not using any inhalers

## 2021-06-03 NOTE — PROCEDURE
[FreeTextEntry1] : CXR 5/1 to 5/27- improving R mid lung zone infiltrate \par last spirometry 2018; normal flows, very mild obstruction

## 2021-06-29 ENCOUNTER — APPOINTMENT (OUTPATIENT)
Dept: VASCULAR SURGERY | Facility: CLINIC | Age: 76
End: 2021-06-29
Payer: MEDICARE

## 2021-06-29 VITALS
SYSTOLIC BLOOD PRESSURE: 133 MMHG | HEIGHT: 63 IN | DIASTOLIC BLOOD PRESSURE: 75 MMHG | BODY MASS INDEX: 22.68 KG/M2 | HEART RATE: 90 BPM | TEMPERATURE: 97.6 F | OXYGEN SATURATION: 92 % | WEIGHT: 128 LBS

## 2021-06-29 DIAGNOSIS — R60.0 LOCALIZED EDEMA: ICD-10-CM

## 2021-06-29 DIAGNOSIS — I87.2 VENOUS INSUFFICIENCY (CHRONIC) (PERIPHERAL): ICD-10-CM

## 2021-06-29 PROCEDURE — 99213 OFFICE O/P EST LOW 20 MIN: CPT | Mod: 25

## 2021-06-29 PROCEDURE — 29580 STRAPPING UNNA BOOT: CPT | Mod: LT

## 2021-06-29 PROCEDURE — 93970 EXTREMITY STUDY: CPT

## 2021-07-02 DIAGNOSIS — Z01.818 ENCOUNTER FOR OTHER PREPROCEDURAL EXAMINATION: ICD-10-CM

## 2021-07-05 ENCOUNTER — APPOINTMENT (OUTPATIENT)
Dept: DISASTER EMERGENCY | Facility: CLINIC | Age: 76
End: 2021-07-05

## 2021-07-09 ENCOUNTER — APPOINTMENT (OUTPATIENT)
Dept: PULMONOLOGY | Facility: CLINIC | Age: 76
End: 2021-07-09

## 2021-08-05 ENCOUNTER — APPOINTMENT (OUTPATIENT)
Dept: CT IMAGING | Facility: CLINIC | Age: 76
End: 2021-08-05
Payer: MEDICARE

## 2021-08-05 ENCOUNTER — OUTPATIENT (OUTPATIENT)
Dept: OUTPATIENT SERVICES | Facility: HOSPITAL | Age: 76
LOS: 1 days | End: 2021-08-05
Payer: MEDICARE

## 2021-08-05 DIAGNOSIS — R91.8 OTHER NONSPECIFIC ABNORMAL FINDING OF LUNG FIELD: ICD-10-CM

## 2021-08-05 PROCEDURE — 71250 CT THORAX DX C-: CPT

## 2021-08-05 PROCEDURE — 71250 CT THORAX DX C-: CPT | Mod: 26,MH

## 2021-08-10 ENCOUNTER — APPOINTMENT (OUTPATIENT)
Dept: PULMONOLOGY | Facility: CLINIC | Age: 76
End: 2021-08-10
Payer: MEDICARE

## 2021-08-10 ENCOUNTER — NON-APPOINTMENT (OUTPATIENT)
Age: 76
End: 2021-08-10

## 2021-08-10 VITALS — SYSTOLIC BLOOD PRESSURE: 112 MMHG | OXYGEN SATURATION: 97 % | DIASTOLIC BLOOD PRESSURE: 78 MMHG | HEART RATE: 68 BPM

## 2021-08-10 VITALS — BODY MASS INDEX: 24.1 KG/M2 | WEIGHT: 126 LBS | HEIGHT: 60.5 IN

## 2021-08-10 PROCEDURE — 99214 OFFICE O/P EST MOD 30 MIN: CPT

## 2021-08-10 RX ORDER — DIPHENOXYLATE HYDROCHLORIDE AND ATROPINE SULFATE 2.5; .025 MG/1; MG/1
2.5-0.025 TABLET ORAL 3 TIMES DAILY
Qty: 90 | Refills: 0 | Status: ACTIVE | COMMUNITY
Start: 2021-05-26

## 2021-08-10 RX ORDER — PREDNISONE 10 MG/1
10 TABLET ORAL
Qty: 30 | Refills: 6 | Status: DISCONTINUED | COMMUNITY
Start: 2021-06-03 | End: 2021-08-10

## 2021-08-11 NOTE — HISTORY OF PRESENT ILLNESS
[TextBox_4] : doing well\par occasional cough associated  reflux\par no fever, chill, chest pain\par no sig dyspnea\par not using any inhalers\par Had Pfizer vaccine

## 2021-08-11 NOTE — PHYSICAL EXAM
[General Appearance - Well Developed] : well developed [General Appearance - Well Nourished] : well nourished [Normal Conjunctiva] : the conjunctiva exhibited no abnormalities [Normal Oropharynx] : normal oropharynx [II] : II [Neck Appearance] : the appearance of the neck was normal [Heart Rate And Rhythm] : heart rate and rhythm were normal [Heart Sounds] : normal S1 and S2 [Murmurs] : no murmurs present [Arterial Pulses Normal] : the arterial pulses were normal [Edema] : no peripheral edema present [Respiration, Rhythm And Depth] : normal respiratory rhythm and effort [Exaggerated Use Of Accessory Muscles For Inspiration] : no accessory muscle use [Auscultation Breath Sounds / Voice Sounds] : lungs were clear to auscultation bilaterally [Lungs Percussion] : the lungs were normal to percussion [Abnormal Walk] : normal gait [Nail Clubbing] : no clubbing of the fingernails [Cyanosis, Localized] : no localized cyanosis [No Focal Deficits] : no focal deficits [Oriented To Time, Place, And Person] : oriented to person, place, and time [Impaired Insight] : insight and judgment were intact [Affect] : the affect was normal [Memory Recent] : recent memory was not impaired [] : no rash [FreeTextEntry1] : no chest wall abn

## 2021-08-11 NOTE — DISCUSSION/SUMMARY
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

## 2021-08-11 NOTE — CONSULT LETTER
[Dear  ___] : Dear  [unfilled], [Consult Letter:] : I had the pleasure of evaluating your patient, [unfilled]. [Please see my note below.] : Please see my note below. [Consult Closing:] : Thank you very much for allowing me to participate in the care of this patient.  If you have any questions, please do not hesitate to contact me. [Sincerely,] : Sincerely, [DrGely  ___] : Dr. BRYAN [[facility], [facility]] : [facility], [facility] [Director, Respiratory Care] : Director, Respiratory Care [[facility]] : [facility] [FreeTextEntry3] : [facility] DO [facility]\par Pulmonary Critical Care\par Director Pulmonary Division\par Medical Director Respiratory Therapy\par [facility]\par \par

## 2021-08-11 NOTE — PROCEDURE
[FreeTextEntry1] : CT scan 8/21: stable mixed GG JUSTIN infiltrate, new RLL nodule\par last spirometry 2018; normal flows, very mild obstruction

## 2021-10-11 ENCOUNTER — APPOINTMENT (OUTPATIENT)
Dept: DERMATOLOGY | Facility: CLINIC | Age: 76
End: 2021-10-11
Payer: MEDICARE

## 2021-10-11 PROCEDURE — 99213 OFFICE O/P EST LOW 20 MIN: CPT

## 2021-10-25 ENCOUNTER — APPOINTMENT (OUTPATIENT)
Dept: INTERNAL MEDICINE | Facility: CLINIC | Age: 76
End: 2021-10-25
Payer: MEDICARE

## 2021-10-25 VITALS
DIASTOLIC BLOOD PRESSURE: 65 MMHG | HEIGHT: 60 IN | SYSTOLIC BLOOD PRESSURE: 120 MMHG | BODY MASS INDEX: 25.32 KG/M2 | WEIGHT: 129 LBS

## 2021-10-25 DIAGNOSIS — I87.2 VENOUS INSUFFICIENCY (CHRONIC) (PERIPHERAL): ICD-10-CM

## 2021-10-25 DIAGNOSIS — R53.83 OTHER FATIGUE: ICD-10-CM

## 2021-10-25 DIAGNOSIS — Z87.2 PERSONAL HISTORY OF DISEASES OF THE SKIN AND SUBCUTANEOUS TISSUE: ICD-10-CM

## 2021-10-25 PROCEDURE — 36415 COLL VENOUS BLD VENIPUNCTURE: CPT

## 2021-10-25 PROCEDURE — 99215 OFFICE O/P EST HI 40 MIN: CPT | Mod: 25

## 2021-10-25 RX ORDER — FLUOCINONIDE 0.5 MG/G
0.05 CREAM TOPICAL TWICE DAILY
Qty: 1 | Refills: 1 | Status: ACTIVE | COMMUNITY
Start: 2021-10-25 | End: 1900-01-01

## 2021-10-25 NOTE — REVIEW OF SYSTEMS
[Fatigue] : fatigue [Cough] : cough [Diarrhea] : diarrhea [Heartburn] : heartburn [Mole Changes] : mole changes [Negative] : Heme/Lymph [de-identified] : LEFT TIBIAL AREA HYPERPIGMENTED ERYTHEMAOUT PURPLISH AREA NO WAMRHT

## 2021-10-25 NOTE — PLAN
[FreeTextEntry1] : 76YO FEMALE  WITH HX OF BRONCHIECTASIS HLD KYPHOSIS ADD  AND FEELS OK BUT WITH FATIGUE\par HAS SEEN DR RAMIREZ AND DR JESSICA SALTER FOR VASCUALRE HERE FOR FOLLOWUP \par PT WITH INGUINAL ADENOPATHY ON LOWER EXT VASCUALR  STUDY HAS A EX FOR CT OF ABD PELVIS FROM PULM AND RECOMMEND SHE GO AHEAD AND GET THIS EXAM DONE\par    WILL CHECK LABS  \par WILL RX LIDEX FOR RIGHT LOWER LEG AS APPEARS TO BE STASIS DERMATITIS \par NEEDS MAMMOGRAM \par REFER TO GASTROENTEROLOGY FOR FOLLOWUP \par WILL FOLLOW UP

## 2021-10-25 NOTE — HISTORY OF PRESENT ILLNESS
[FreeTextEntry1] : FATIGUE [de-identified] : 74YO FEMALE  WITH HX OF BRONCHIECTASIS HLD KYPHOSIS ADD  AND FEELS OK BUT WITH FATIGUE\par HAS SEEN DR RAMIREZ AND DR JESSICA SALTER FOR VASCUALRE HERE FOR FOLLOWUP \par

## 2021-10-25 NOTE — PHYSICAL EXAM
[Well Nourished] : well nourished [Well Developed] : well developed [Well-Appearing] : well-appearing [Normal Sclera/Conjunctiva] : normal sclera/conjunctiva [PERRL] : pupils equal round and reactive to light [EOMI] : extraocular movements intact [Normal Outer Ear/Nose] : the outer ears and nose were normal in appearance [Normal Oropharynx] : the oropharynx was normal [No JVD] : no jugular venous distention [No Lymphadenopathy] : no lymphadenopathy [Supple] : supple [Thyroid Normal, No Nodules] : the thyroid was normal and there were no nodules present [No Respiratory Distress] : no respiratory distress  [No Accessory Muscle Use] : no accessory muscle use [Clear to Auscultation] : lungs were clear to auscultation bilaterally [Normal Rate] : normal rate  [Regular Rhythm] : with a regular rhythm [Normal S1, S2] : normal S1 and S2 [No Murmur] : no murmur heard [No Carotid Bruits] : no carotid bruits [No Abdominal Bruit] : a ~M bruit was not heard ~T in the abdomen [No Varicosities] : no varicosities [Pedal Pulses Present] : the pedal pulses are present [No Edema] : there was no peripheral edema [No Palpable Aorta] : no palpable aorta [No Extremity Clubbing/Cyanosis] : no extremity clubbing/cyanosis [Soft] : abdomen soft [Non Tender] : non-tender [Non-distended] : non-distended [No Masses] : no abdominal mass palpated [No HSM] : no HSM [Normal Bowel Sounds] : normal bowel sounds [Normal Posterior Cervical Nodes] : no posterior cervical lymphadenopathy [Normal Anterior Cervical Nodes] : no anterior cervical lymphadenopathy [No CVA Tenderness] : no CVA  tenderness [No Spinal Tenderness] : no spinal tenderness [No Joint Swelling] : no joint swelling [Grossly Normal Strength/Tone] : grossly normal strength/tone [No Rash] : no rash [Coordination Grossly Intact] : coordination grossly intact [No Focal Deficits] : no focal deficits [Normal Gait] : normal gait [Deep Tendon Reflexes (DTR)] : deep tendon reflexes were 2+ and symmetric [Normal Affect] : the affect was normal [Normal Insight/Judgement] : insight and judgment were intact [de-identified] : RIGHT LOWER EXT TIBIAL AREA WITH ? STASIS DERMATIITS PURPLISH ERYTHEMAOUTS AREA NO BLISTER

## 2021-11-01 LAB
25(OH)D3 SERPL-MCNC: 39 NG/ML
ALBUMIN SERPL ELPH-MCNC: 4.5 G/DL
ALP BLD-CCNC: 94 U/L
ALT SERPL-CCNC: 14 U/L
ANION GAP SERPL CALC-SCNC: 11 MMOL/L
AST SERPL-CCNC: 16 U/L
BASOPHILS # BLD AUTO: 0.05 K/UL
BASOPHILS NFR BLD AUTO: 0.8 %
BILIRUB SERPL-MCNC: 0.4 MG/DL
BUN SERPL-MCNC: 13 MG/DL
CALCIUM SERPL-MCNC: 9.5 MG/DL
CHLORIDE SERPL-SCNC: 104 MMOL/L
CHOLEST SERPL-MCNC: 195 MG/DL
CO2 SERPL-SCNC: 24 MMOL/L
CREAT SERPL-MCNC: 0.5 MG/DL
EOSINOPHIL # BLD AUTO: 0.07 K/UL
EOSINOPHIL NFR BLD AUTO: 1.2 %
ESTIMATED AVERAGE GLUCOSE: 105 MG/DL
GLUCOSE SERPL-MCNC: 93 MG/DL
HBA1C MFR BLD HPLC: 5.3 %
HCT VFR BLD CALC: 41.7 %
HDLC SERPL-MCNC: 71 MG/DL
HGB BLD-MCNC: 13.4 G/DL
IMM GRANULOCYTES NFR BLD AUTO: 0.2 %
LDLC SERPL CALC-MCNC: 110 MG/DL
LYMPHOCYTES # BLD AUTO: 2.64 K/UL
LYMPHOCYTES NFR BLD AUTO: 44.2 %
MAN DIFF?: NORMAL
MCHC RBC-ENTMCNC: 30.9 PG
MCHC RBC-ENTMCNC: 32.1 GM/DL
MCV RBC AUTO: 96.1 FL
MONOCYTES # BLD AUTO: 0.35 K/UL
MONOCYTES NFR BLD AUTO: 5.9 %
NEUTROPHILS # BLD AUTO: 2.85 K/UL
NEUTROPHILS NFR BLD AUTO: 47.7 %
NONHDLC SERPL-MCNC: 124 MG/DL
PLATELET # BLD AUTO: 287 K/UL
POTASSIUM SERPL-SCNC: 5.1 MMOL/L
PROT SERPL-MCNC: 6.1 G/DL
RBC # BLD: 4.34 M/UL
RBC # FLD: 14.6 %
SODIUM SERPL-SCNC: 139 MMOL/L
TRIGL SERPL-MCNC: 68 MG/DL
WBC # FLD AUTO: 5.97 K/UL

## 2021-11-23 ENCOUNTER — OUTPATIENT (OUTPATIENT)
Dept: OUTPATIENT SERVICES | Facility: HOSPITAL | Age: 76
LOS: 1 days | End: 2021-11-23
Payer: MEDICARE

## 2021-11-23 ENCOUNTER — APPOINTMENT (OUTPATIENT)
Dept: CT IMAGING | Facility: CLINIC | Age: 76
End: 2021-11-23
Payer: MEDICARE

## 2021-11-23 DIAGNOSIS — R59.0 LOCALIZED ENLARGED LYMPH NODES: ICD-10-CM

## 2021-11-23 PROCEDURE — 74176 CT ABD & PELVIS W/O CONTRAST: CPT

## 2021-11-23 PROCEDURE — 74176 CT ABD & PELVIS W/O CONTRAST: CPT | Mod: 26,MH

## 2021-11-30 ENCOUNTER — NON-APPOINTMENT (OUTPATIENT)
Age: 76
End: 2021-11-30

## 2021-11-30 DIAGNOSIS — Z03.89 ENCOUNTER FOR OBSERVATION FOR OTHER SUSPECTED DISEASES AND CONDITIONS RULED OUT: ICD-10-CM

## 2021-12-03 ENCOUNTER — APPOINTMENT (OUTPATIENT)
Dept: NUCLEAR MEDICINE | Facility: CLINIC | Age: 76
End: 2021-12-03
Payer: MEDICARE

## 2021-12-03 ENCOUNTER — OUTPATIENT (OUTPATIENT)
Dept: OUTPATIENT SERVICES | Facility: HOSPITAL | Age: 76
LOS: 1 days | End: 2021-12-03
Payer: MEDICARE

## 2021-12-03 DIAGNOSIS — Z03.89 ENCOUNTER FOR OBSERVATION FOR OTHER SUSPECTED DISEASES AND CONDITIONS RULED OUT: ICD-10-CM

## 2021-12-03 DIAGNOSIS — Z00.8 ENCOUNTER FOR OTHER GENERAL EXAMINATION: ICD-10-CM

## 2021-12-03 PROCEDURE — 78815 PET IMAGE W/CT SKULL-THIGH: CPT | Mod: 26,PI,MH

## 2021-12-03 PROCEDURE — A9552: CPT

## 2021-12-03 PROCEDURE — 78815 PET IMAGE W/CT SKULL-THIGH: CPT

## 2021-12-09 ENCOUNTER — RESULT REVIEW (OUTPATIENT)
Age: 76
End: 2021-12-09

## 2021-12-09 ENCOUNTER — APPOINTMENT (OUTPATIENT)
Dept: GASTROENTEROLOGY | Facility: CLINIC | Age: 76
End: 2021-12-09
Payer: MEDICARE

## 2021-12-09 ENCOUNTER — APPOINTMENT (OUTPATIENT)
Dept: SURGICAL ONCOLOGY | Facility: CLINIC | Age: 76
End: 2021-12-09
Payer: MEDICARE

## 2021-12-09 VITALS
SYSTOLIC BLOOD PRESSURE: 135 MMHG | OXYGEN SATURATION: 100 % | RESPIRATION RATE: 16 BRPM | TEMPERATURE: 98 F | HEART RATE: 97 BPM | DIASTOLIC BLOOD PRESSURE: 80 MMHG | BODY MASS INDEX: 24.35 KG/M2 | HEIGHT: 61 IN | WEIGHT: 129 LBS

## 2021-12-09 VITALS
DIASTOLIC BLOOD PRESSURE: 83 MMHG | HEIGHT: 61 IN | WEIGHT: 129 LBS | OXYGEN SATURATION: 100 % | BODY MASS INDEX: 24.35 KG/M2 | HEART RATE: 97 BPM | SYSTOLIC BLOOD PRESSURE: 135 MMHG

## 2021-12-09 DIAGNOSIS — R91.8 OTHER NONSPECIFIC ABNORMAL FINDING OF LUNG FIELD: ICD-10-CM

## 2021-12-09 DIAGNOSIS — K21.9 GASTRO-ESOPHAGEAL REFLUX DISEASE W/OUT ESOPHAGITIS: ICD-10-CM

## 2021-12-09 DIAGNOSIS — K22.89 OTHER SPECIFIED DISEASE OF ESOPHAGUS: ICD-10-CM

## 2021-12-09 DIAGNOSIS — R59.0 LOCALIZED ENLARGED LYMPH NODES: ICD-10-CM

## 2021-12-09 DIAGNOSIS — K52.9 NONINFECTIVE GASTROENTERITIS AND COLITIS, UNSPECIFIED: ICD-10-CM

## 2021-12-09 PROCEDURE — 99203 OFFICE O/P NEW LOW 30 MIN: CPT

## 2021-12-09 PROCEDURE — 99214 OFFICE O/P EST MOD 30 MIN: CPT

## 2021-12-09 NOTE — HISTORY OF PRESENT ILLNESS
[de-identified] : The patient arrived for a consultation visit.  The patient was referred by Dr. Ho Shields for esophageal wall thickening and retroperitoneal/mesenteric/inguinal adenopathy.  The patient has seen Dr. Federico Camacho today.  She has been complaining of acid reflux and has been taking Pepcid.  She is being planned for cold biopsy of the inguinal node.  She had a EGD and a colonoscopy about 8 years ago.  Colonoscopy was normal.  She is denying any odynophagia or dysphagia.  She is complaining of nocturnal cough.  She had pneumonia in the beginning of the year. She is an ex smoker. She is not on home oxygen.

## 2021-12-09 NOTE — ASSESSMENT
[FreeTextEntry1] : Ms. YELITZA VALDEZ  is a 76 year  old female  presenting for an initial consultation for evaluation of lymphadenopathy, referred by Dr. Ho Shields. Patient underwent a CT A/P on 11/23/2021 which showed retroperitoneal, mesenteric and left inguinal adenopathy. \par \par PET CT 12/3/2021: \par IMPRESSION:  FDG-PET/CT scan demonstrates:\par 1.)  Nonspecific mildly FDG avid bilateral axillary and bilateral mediastinal lymph nodes. The left inguinal node is amenable to ultrasound-guided FNA biopsy as indicated.\par 2.)  Nonspecific FDG avidity in the distal third of the esophagus with questionable wall thickening on CT. This may be further evaluated with endoscopy as warranted.\par 3.)  Nonspecific minimally FDG avid groundglass nodule in the left upper lung, unchanged on CT from 8/5/2021. This is nonspecific. Malignancy is with low-grade FDG activity assessed as adenocarcinoma in situ may present in a similar fashion.\par 4) Non FDG avid retroperitoneal, pelvic and mesenteric lymph nodes\par \par Pt. is scheduled to see Dr. Ghulam Mcfadden (GI) this afternoon. \par \par PLAN:\par 1) Reviewed PET/CT results and low avidity in LNs - highest was 3.9 in left inguinal LN.\par 2) We spoke about the differential diagnosis of lymphadenopathy - infectious vs. inflammatory vs. neoplastic. Unlikely to be infectious as patient is asymptomatic. We spoke about best way to get diagnosis - core biopsy vs. excisional. She likes the idea of doing a core biopsy first and saving excisional biopsy if we are unable to get the answer. She understands that the core biopsy is not guaranteed to get the answer, specifically if its a lymphoma such as CLL.

## 2021-12-09 NOTE — CONSULT LETTER
[Dear  ___] : Dear  [unfilled], [Consult Letter:] : I had the pleasure of evaluating your patient, [unfilled]. [Please see my note below.] : Please see my note below. [Consult Closing:] : Thank you very much for allowing me to participate in the care of this patient.  If you have any questions, please do not hesitate to contact me. [Sincerely,] : Sincerely, [FreeTextEntry3] : Federico Camacho MD, MPH, FACS, FSSO\par , [facility] General Surgical Oncology [facility]\par [facility] [place]\par Associate Professor of Surgery\par [employer] [facility]

## 2021-12-09 NOTE — HISTORY OF PRESENT ILLNESS
[de-identified] : Ms. YELITZA VALDEZ  is a 76 year  old female  presenting for an initial consultation for evaluation of lymphadenopathy, referred by  Dr. Ho Shields. \par \par Patient had a cough in 5/2021 and underwent a CXR on 5/27/2021 which showed right midlung opacity of indeterminate etiology for which CT Chest was recommended. CT Chest performed on 8/5/2021 showed a stable left apex 2.0 cm groundglass nodule with 0.4 cm solid component. F/u is recommended as this could represent an indolent neoplasm.  Pt. was seen by Dr. Shields (Pulm) on 8/10/2021, who referred the patient to Dr. Arellano.  Pt. has not yet seen [facility]. \par \par Patient underwent a CT A/P on 11/23/2021 which showed retroperitoneal, mesenteric and left inguinal adenopathy. \par \par PET CT 12/3/2021: \par IMPRESSION:  FDG-PET/CT scan demonstrates:\par 1.)  Nonspecific mildly FDG avid bilateral axillary and bilateral mediastinal lymph nodes. The left inguinal node is amenable to ultrasound-guided FNA biopsy as indicated.\par 2.)  Nonspecific FDG avidity in the distal third of the esophagus with questionable wall thickening on CT. This may be further evaluated with endoscopy as warranted.\par 3.)  Nonspecific minimally FDG avid groundglass nodule in the left upper lung, unchanged on CT from 8/5/2021. This is nonspecific. Malignancy is with low-grade FDG activity assessed as adenocarcinoma in situ may present in a similar fashion.\par 4) Non FDG avid retroperitoneal, pelvic and mesenteric lymph nodes\par \par Pt. is scheduled to see Dr. Ghulam Mcfadden (GI) this afternoon for an evaluation. \par \par Past medical history notable for BLE venous insufficiency (followed by Dr. Pacheco), ADD, Bronchiectasis, CAP, GERD, HLD, Kyphosis, Osteoporosis, Pre-DM. Past surgical history notable for hysterectomy, appendectomy\par

## 2021-12-09 NOTE — ASSESSMENT
[FreeTextEntry1] : Acid reflux: I am recommending to start the PPI therapy in the morning and she can take Pepcid at bedtime.  Esophageal thickening may be related to the underlying acid reflux.  We will schedule her for a upper endoscopy.\par \par Lymphadenopathy: Patient is already being scheduled for: FNA.  Patient may undergo excisional biopsy if there is further need.\par \par Patient is medically optimized for EGD.  Will obtain the previous EGD and colonoscopy records. \par \par Ghulam Mcfadden MD\par Gastroenterology \par \par

## 2022-01-06 ENCOUNTER — NON-APPOINTMENT (OUTPATIENT)
Age: 77
End: 2022-01-06

## 2022-01-27 ENCOUNTER — TRANSCRIPTION ENCOUNTER (OUTPATIENT)
Age: 77
End: 2022-01-27

## 2022-01-30 LAB
BASOPHILS # BLD AUTO: 0.04 K/UL
BASOPHILS NFR BLD AUTO: 0.7 %
EOSINOPHIL # BLD AUTO: 0.08 K/UL
EOSINOPHIL NFR BLD AUTO: 1.4 %
HCT VFR BLD CALC: 43 %
HGB BLD-MCNC: 13.9 G/DL
IMM GRANULOCYTES NFR BLD AUTO: 0.2 %
INR PPP: 0.92 RATIO
LYMPHOCYTES # BLD AUTO: 2.37 K/UL
LYMPHOCYTES NFR BLD AUTO: 41.7 %
MAN DIFF?: NORMAL
MCHC RBC-ENTMCNC: 30.8 PG
MCHC RBC-ENTMCNC: 32.3 GM/DL
MCV RBC AUTO: 95.1 FL
MONOCYTES # BLD AUTO: 0.22 K/UL
MONOCYTES NFR BLD AUTO: 3.9 %
NEUTROPHILS # BLD AUTO: 2.96 K/UL
NEUTROPHILS NFR BLD AUTO: 52.1 %
PLATELET # BLD AUTO: 212 K/UL
PT BLD: 10.9 SEC
RBC # BLD: 4.52 M/UL
RBC # FLD: 13.9 %
SARS-COV-2 N GENE NPH QL NAA+PROBE: NOT DETECTED
WBC # FLD AUTO: 5.68 K/UL

## 2022-02-01 ENCOUNTER — OUTPATIENT (OUTPATIENT)
Dept: OUTPATIENT SERVICES | Facility: HOSPITAL | Age: 77
LOS: 1 days | End: 2022-02-01

## 2022-02-01 ENCOUNTER — LABORATORY RESULT (OUTPATIENT)
Age: 77
End: 2022-02-01

## 2022-02-01 ENCOUNTER — APPOINTMENT (OUTPATIENT)
Dept: INTERVENTIONAL RADIOLOGY/VASCULAR | Facility: CLINIC | Age: 77
End: 2022-02-01
Payer: MEDICARE

## 2022-02-01 VITALS
DIASTOLIC BLOOD PRESSURE: 73 MMHG | RESPIRATION RATE: 16 BRPM | SYSTOLIC BLOOD PRESSURE: 141 MMHG | OXYGEN SATURATION: 99 % | HEART RATE: 85 BPM | TEMPERATURE: 97.2 F

## 2022-02-01 DIAGNOSIS — R59.0 LOCALIZED ENLARGED LYMPH NODES: ICD-10-CM

## 2022-02-01 PROCEDURE — 38505 NEEDLE BIOPSY LYMPH NODES: CPT

## 2022-02-01 PROCEDURE — 76942 ECHO GUIDE FOR BIOPSY: CPT | Mod: 26

## 2022-02-01 RX ORDER — AZITHROMYCIN 250 MG/1
250 TABLET, FILM COATED ORAL
Refills: 0 | Status: DISCONTINUED | COMMUNITY
Start: 2022-01-06 | End: 2022-02-01

## 2022-02-16 ENCOUNTER — APPOINTMENT (OUTPATIENT)
Dept: GASTROENTEROLOGY | Facility: GI CENTER | Age: 77
End: 2022-02-16

## 2022-02-17 ENCOUNTER — TRANSCRIPTION ENCOUNTER (OUTPATIENT)
Age: 77
End: 2022-02-17

## 2022-02-18 ENCOUNTER — APPOINTMENT (OUTPATIENT)
Dept: INTERNAL MEDICINE | Facility: CLINIC | Age: 77
End: 2022-02-18
Payer: MEDICARE

## 2022-02-18 VITALS
SYSTOLIC BLOOD PRESSURE: 125 MMHG | WEIGHT: 130 LBS | DIASTOLIC BLOOD PRESSURE: 65 MMHG | BODY MASS INDEX: 24.55 KG/M2 | HEIGHT: 61 IN

## 2022-02-18 PROCEDURE — 99214 OFFICE O/P EST MOD 30 MIN: CPT

## 2022-02-18 RX ORDER — FLUTICASONE PROPIONATE 50 UG/1
50 SPRAY, METERED NASAL TWICE DAILY
Qty: 1 | Refills: 1 | Status: ACTIVE | COMMUNITY
Start: 2022-02-18 | End: 1900-01-01

## 2022-02-18 NOTE — HEALTH RISK ASSESSMENT
[0] : 2) Feeling down, depressed, or hopeless: Not at all (0) [PHQ-2 Negative - No further assessment needed] : PHQ-2 Negative - No further assessment needed [[id number]] : 0

## 2022-02-18 NOTE — PHYSICAL EXAM
[No Acute Distress] : no acute distress [Well Nourished] : well nourished [Well Developed] : well developed [Well-Appearing] : well-appearing [Normal Sclera/Conjunctiva] : normal sclera/conjunctiva [PERRL] : pupils equal round and reactive to light [EOMI] : extraocular movements intact [Normal Outer Ear/Nose] : the outer ears and nose were normal in appearance [No JVD] : no jugular venous distention [No Lymphadenopathy] : no lymphadenopathy [Supple] : supple [Thyroid Normal, No Nodules] : the thyroid was normal and there were no nodules present [No Respiratory Distress] : no respiratory distress  [No Accessory Muscle Use] : no accessory muscle use [Clear to Auscultation] : lungs were clear to auscultation bilaterally [Normal Rate] : normal rate  [Regular Rhythm] : with a regular rhythm [Normal S1, S2] : normal S1 and S2 [No Murmur] : no murmur heard [No Carotid Bruits] : no carotid bruits [No Abdominal Bruit] : a ~M bruit was not heard ~T in the abdomen [No Varicosities] : no varicosities [Pedal Pulses Present] : the pedal pulses are present [No Edema] : there was no peripheral edema [No Palpable Aorta] : no palpable aorta [No Extremity Clubbing/Cyanosis] : no extremity clubbing/cyanosis [Soft] : abdomen soft [Non Tender] : non-tender [Non-distended] : non-distended [No Masses] : no abdominal mass palpated [No HSM] : no HSM [Normal Bowel Sounds] : normal bowel sounds [Normal Posterior Cervical Nodes] : no posterior cervical lymphadenopathy [Normal Anterior Cervical Nodes] : no anterior cervical lymphadenopathy [No CVA Tenderness] : no CVA  tenderness [No Spinal Tenderness] : no spinal tenderness [No Joint Swelling] : no joint swelling [Grossly Normal Strength/Tone] : grossly normal strength/tone [No Rash] : no rash [Coordination Grossly Intact] : coordination grossly intact [No Focal Deficits] : no focal deficits [Normal Gait] : normal gait [Deep Tendon Reflexes (DTR)] : deep tendon reflexes were 2+ and symmetric [Normal Affect] : the affect was normal [Normal Insight/Judgement] : insight and judgment were intact [de-identified] : fullness of TM bilaterally, cobblestoning of pharynx Tenderness with palpation of right maxillary sinus

## 2022-02-18 NOTE — PLAN
[FreeTextEntry1] : Patient with sinusitis, will start antibiotics and intranasal steroid today. \par \par Bronchiectasis- patient will continue to follow with pulmonologist \par \par Prior to appointment and during encounter with patient extensive medical records were reviewed including but not limited to, Hospital records records, out patient records, laboratory data and microbiology data \par In addition extensive time was also spent in reviewing diagnostic studies.\par \par Total encounter total time 30 mins\par >50% of time spent counseling/coordinating care\par \par \par Counseling included abnormal lab results, differential diagnoses, treatment options, risks and benefits, lifestyle changes, prognosis, current condition, medications, and dose adjustments. \par The patient was interactive, attentive, asked questions, and verbalized understanding

## 2022-02-18 NOTE — HISTORY OF PRESENT ILLNESS
[FreeTextEntry1] : nasal congestion [de-identified] : Patient is a 76 year old female with a past medical history of bronchiectasis comes to the office c/o  sinus congestion and green nasal discharge x 10 days. Patient was seen at Urgent care yesterday had negative rapid test for covid. Patient denies fever, cough SOB. No other complaints at this time.

## 2022-03-02 NOTE — ASSESSMENT
[FreeTextEntry1] : Under ultrasound guidance, 4, 18-gauge cores were obtained. 3 cores were placed in formalin and one core was placed in RPMI.\par \par FORMAL REPORT TO FOLLOW\par

## 2022-03-02 NOTE — HISTORY OF PRESENT ILLNESS
[FreeTextEntry1] : PRE CALL PRIOR TO APPOINTMENT: \par \par Phone Number: [phone]\par \par Last done: never \par \par COVID-19 SWAB: advised \par \par RX on file: yes\par \par Referring MD: Federico Camacho \par \par Appointment date: 02/01/2022\par \par Clotting or Bleeding disorders:  no\par \par PPM / Defibrillator: no\par \par NPO status advised: N/A \par \par History of fall:    yes\par \par Assistant device for walking: no\par \par  home: self\par \par Blood Thinners:   no                          \par \par Prescribing MD agree to have blood thinner medication held: n/a\par \par Capacity to make decisions: yes\par \par HCP: yes\par \par DNR: no\par \par Person contact for Pre-Call: Spoke with pt by LT\par \par --------------------------------------------------------------------------------------------------------\par \par \par \par Lesvia- Operative Assessment: (Day of Procedure)\par \par NPO status: n/a\par \par Falls risk: yes\par \par IVL: \par \par IR MD: Dr. Siobhan Brown \par \par Urine Pregnancy: N/A \par \par PRE-OP instructions: yes\par \par POST-OP teaching initiated: yes\par \par Allergy bracelet on: yes\par \par Antibiotic given: n/a\par \par \par

## 2022-04-26 ENCOUNTER — APPOINTMENT (OUTPATIENT)
Dept: PULMONOLOGY | Facility: CLINIC | Age: 77
End: 2022-04-26
Payer: MEDICARE

## 2022-04-26 VITALS
OXYGEN SATURATION: 98 % | HEART RATE: 91 BPM | WEIGHT: 125 LBS | RESPIRATION RATE: 16 BRPM | DIASTOLIC BLOOD PRESSURE: 70 MMHG | BODY MASS INDEX: 23.62 KG/M2 | SYSTOLIC BLOOD PRESSURE: 130 MMHG

## 2022-04-26 PROCEDURE — 99215 OFFICE O/P EST HI 40 MIN: CPT

## 2022-04-26 RX ORDER — DOXYCYCLINE HYCLATE 100 MG/1
100 TABLET ORAL TWICE DAILY
Qty: 14 | Refills: 0 | Status: DISCONTINUED | COMMUNITY
Start: 2022-02-18 | End: 2022-04-26

## 2022-04-26 RX ORDER — FLUTICASONE PROPIONATE 50 UG/1
50 SPRAY, METERED NASAL DAILY
Qty: 3 | Refills: 1 | Status: DISCONTINUED | COMMUNITY
Start: 2020-01-28 | End: 2022-04-26

## 2022-04-26 NOTE — CONSULT LETTER
[Dear  ___] : Dear  [unfilled], [Consult Letter:] : I had the pleasure of evaluating your patient, [unfilled]. [Please see my note below.] : Please see my note below. [Consult Closing:] : Thank you very much for allowing me to participate in the care of this patient.  If you have any questions, please do not hesitate to contact me. [Sincerely,] : Sincerely, [DrGely  ___] : Dr. BRYAN [Ho Shields DO, [facility]] : Ho Shields DO, [facility] [Director, Respiratory Care] : Director, Respiratory Care [[facility]] : [facility] [FreeTextEntry3] : [facility]\par Pulmonary Critical Care\par Director Pulmonary Division\par Medical Director Respiratory Therapy\par [facility]\par \par

## 2022-04-26 NOTE — DISCUSSION/SUMMARY
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

## 2022-04-26 NOTE — HISTORY OF PRESENT ILLNESS
[TextBox_4] : doing well\par occasional cough associated  reflux\par no fever, chill, chest pain\par no sig dyspnea\par not using any inhalers\par Had Pfizer vaccine\par PET scan noted\par IR axillary lymph node Bx c/w lymphoma

## 2022-04-26 NOTE — PROCEDURE
[FreeTextEntry1] : PET 12/21: min uptake JUSTIN mixed GG no change from 8/21\par axillary nodes, mild mediastinal nodes with uptake \par esophageal wall uptake

## 2022-04-28 ENCOUNTER — OUTPATIENT (OUTPATIENT)
Dept: OUTPATIENT SERVICES | Facility: HOSPITAL | Age: 77
LOS: 1 days | Discharge: HOME | End: 2022-04-28

## 2022-04-28 DIAGNOSIS — C85.10 UNSPECIFIED B-CELL LYMPHOMA, UNSPECIFIED SITE: ICD-10-CM

## 2022-05-02 ENCOUNTER — RESULT REVIEW (OUTPATIENT)
Age: 77
End: 2022-05-02

## 2022-05-02 ENCOUNTER — APPOINTMENT (OUTPATIENT)
Dept: HEMATOLOGY ONCOLOGY | Facility: CLINIC | Age: 77
End: 2022-05-02
Payer: MEDICARE

## 2022-05-02 VITALS
OXYGEN SATURATION: 96 % | SYSTOLIC BLOOD PRESSURE: 145 MMHG | WEIGHT: 129 LBS | HEART RATE: 91 BPM | HEIGHT: 61 IN | BODY MASS INDEX: 24.35 KG/M2 | DIASTOLIC BLOOD PRESSURE: 84 MMHG

## 2022-05-02 DIAGNOSIS — C83.00 SMALL CELL B-CELL LYMPHOMA, UNSPECIFIED SITE: ICD-10-CM

## 2022-05-02 LAB
BASOPHILS # BLD AUTO: 0.1 K/UL — SIGNIFICANT CHANGE UP (ref 0–0.2)
BASOPHILS NFR BLD AUTO: 1.1 % — SIGNIFICANT CHANGE UP (ref 0–2)
EOSINOPHIL # BLD AUTO: 0.1 K/UL — SIGNIFICANT CHANGE UP (ref 0–0.5)
EOSINOPHIL NFR BLD AUTO: 1 % — SIGNIFICANT CHANGE UP (ref 0–6)
HCT VFR BLD CALC: 42 % — SIGNIFICANT CHANGE UP (ref 34.5–45)
HGB BLD-MCNC: 13.6 G/DL — SIGNIFICANT CHANGE UP (ref 11.5–15.5)
LYMPHOCYTES # BLD AUTO: 2.8 K/UL — SIGNIFICANT CHANGE UP (ref 1–3.3)
LYMPHOCYTES # BLD AUTO: 42.9 % — SIGNIFICANT CHANGE UP (ref 13–44)
MCHC RBC-ENTMCNC: 31.4 PG — SIGNIFICANT CHANGE UP (ref 27–34)
MCHC RBC-ENTMCNC: 32.5 G/DL — SIGNIFICANT CHANGE UP (ref 32–36)
MCV RBC AUTO: 96.7 FL — SIGNIFICANT CHANGE UP (ref 80–100)
MONOCYTES # BLD AUTO: 0.2 K/UL — SIGNIFICANT CHANGE UP (ref 0–0.9)
MONOCYTES NFR BLD AUTO: 2.9 % — SIGNIFICANT CHANGE UP (ref 2–14)
NEUTROPHILS # BLD AUTO: 3.4 K/UL — SIGNIFICANT CHANGE UP (ref 1.8–7.4)
NEUTROPHILS NFR BLD AUTO: 52 % — SIGNIFICANT CHANGE UP (ref 43–77)
PLATELET # BLD AUTO: 281 K/UL — SIGNIFICANT CHANGE UP (ref 150–400)
RBC # BLD: 4.34 M/UL — SIGNIFICANT CHANGE UP (ref 3.8–5.2)
RBC # FLD: 12.9 % — SIGNIFICANT CHANGE UP (ref 10.3–14.5)
WBC # BLD: 6.6 K/UL — SIGNIFICANT CHANGE UP (ref 3.8–10.5)
WBC # FLD AUTO: 6.6 K/UL — SIGNIFICANT CHANGE UP (ref 3.8–10.5)

## 2022-05-02 PROCEDURE — 99205 OFFICE O/P NEW HI 60 MIN: CPT

## 2022-05-02 NOTE — ADDENDUM
[FreeTextEntry1] : Documented by Erin Doan acting as scribe for Dr. Raines on 05/02/2022 \par \par All Medical record entries made by the Scribe were at my, Dr. Raines's, direction and personally dictated by me on 05/02/2022 . I have reviewed the chart and agree that the record accurately reflects my personal performance of the history, physical exam, assessment and plan. I have also personally directed, reviewed, and agreed with the discharge instructions.\par

## 2022-05-02 NOTE — HISTORY OF PRESENT ILLNESS
[de-identified] : 76 year old female with PMHx of HLD, GERD and chronic diarrhea, hx of hysterectomy here for initial visit referred by Pulmonologist, Dr. Shields. \par Patient presented to vascular specialist, Dr. Rubio, in June 2021 for routine follow up for venous insufficiency. Venous doppler performed on 6/29/21 demonstrated enlarged lymph nodes in the left groin 2.4 x 1.1 cm medially and 1.5 x 0.5 cm laterally. \par Subsequent CT Abd/Pel performed on 11/23/21 revealing retroperitoneal adenopathy, with representative para-aortic node measuring to 1.8 x 1.2 cm. Mesenteric adenopathy, with representative node measuring 1.6 x 1.4 cm and  left inguinal adenopathy measure up to 1.9 x 1.4 cm.\par PET/CT performed on  12/3/21 demonstrating mildly FDG avid left greater than right axillary lymph nodes with reference 1.8 x 1.3 cm left axillary node (SUV 3.0).  Minimally FDG avid 2.2 x 1.3 cm ground glass nodule in the left upper lung (SUV 1.8), not significantly changed from CT on 8/5/2021.No enlarged or FDG-avid lymph node in the abdomen or pelvis. A few non-FDG avid retroperitoneal and mesenteric lymph nodes. Reference non-FDG avid para-aortic lymph nodes measures 1.5 x 1.2 cm . Reference largest mesenteric node just posterior to the transverse colon measures 1 x 0.5 cm. Non-FDG avid right obturator node measures 1.7 x 0.7 cm  . Nonspecific minimally FDG avid small and mildly enlarged bilateral inguinal with a reference 2 x 1.3 cm left inguinal node with SUV 3.9. Reference 1 cm left inguinal node with SUV 1.7. CT CHEST on 7/1/20; 1.4 x 1.3 cm ground glass and solid density in the left uppper lobe apex with adjacent 1.0 x 0.4cm pleural-based thickening \par \par \par Patient evaluated by Dr. Camacho on 12/9/21 and it was decided to move forward with core biopsy. Biopsy of left inguinal node performed on 2/1/22 with Dr. Brown. 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  \par \par \par  [de-identified] : Patient presents for an initial consultation with friend \par Patient denies any cervical lymph nodes\par Reports chronic acid reflux and dry cough resolves with Famotidine.\par Reports weight gain\par Reports bloating (2/2 to hysterectomy)\par Reports normal energy level\par Denies fever, chills, night sweats, weight loss, appetite loss, \par Former smoker 1/2 a day for 10-15 years. Quit at age 55 about 20 years ago \par \par Retired December 2020, worked at [employer]

## 2022-05-02 NOTE — RESULTS/DATA
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\par -Immunohistochemical stains   for CD3, CD5, CD20, PAX5, CD10, BCL6, BCL2, CyclinD1, Ki67, CD23, CD21, CD43, CD79a, MUM1, [id number] performed on\par  formalin fixed paraffin embedded tissue, block 1A.\par -Neoplastic cells are:  \par Positive:   CD5, CD20, PAX5, BCL-2, CD79a, CD43 (subset)\par Negative:   CD10, BCL6, CYCLIN D-1, CD23, MUM-1, LEF-1\par Other:   Ki67 proliferation index is 10% overall\par CD3 stain highlights small T cells in the background\par CD21 and CD23 stains  highlight small residual follicular dendritic cellmeshwork\par SOX11 stain is negative.\par \par -Flow cytometry analysis (34-NB-):    Flow cytometric analysis\par  attempted however, insufficient cells to report flow cytometry.  \par \par \par 12/3/21 PET/CT \par CHEST: Mildly FDG avid left greater than right axillary lymph nodes with reference 1.8 x 1.3 cm left axillary node (SUV 3.0). Mediastinum and hilar regions are unremarkable\par LUNGS: Minimally FDG avid 2.2 x 1.3 cm ground glass nodule in the left upper lung (SUV 1.8), not significantly changed from CT on 8/5/2021.\par ABDOMINOPELVIC NODES: No enlarged or FDG-avid lymph node in the abdomen or pelvis. A few non-FDG avid retroperitoneal and mesenteric lymph nodes. Reference non-FDG avid para-aortic lymph nodes measures 1.5 x 1.2 cm . Reference largest mesenteric node just posterior to the transverse colon measures 1 x 0.5 cm (image 187). Non-FDG avid right obturator node measures 1.7 x 0.7 cm (image 117). Nonspecific minimally FDG avid small and mildly enlarged bilateral inguinal with a reference 2 x 1.3\par cm left inguinal node with SUV 3.9. Reference 1 cm left inguinal node with SUV 1.7\par \par IMPRESSION:  FDG-PET/CT scan demonstrates:\par \par 1. Nonspecific mildly FDG avid bilateral axillary and bilateral mediastinal lymph nodes. The left\par inguinal node is amenable to ultrasound-guided FNA biopsy as indicated.\par \par 2. Nonspecific FDG avidity in the distal third of the esophagus with questionable wall thickening on\par CT. This may be further evaluated with endoscopy as warranted.\par \par 3. Nonspecific minimally FDG avid groundglass nodule in the left upper lung, unchanged on CT from\par 8/5/2021. This is nonspecific. Malignancy is with low-grade FDG activity assessed as\par adenocarcinoma in situ may present in a similar fashion.\par \par 4. Non-FDG avid retroperitoneal, pelvic and mesenteric lymph nodes.\par \par \par \par \par 11/23/21 CT Abd/Pel\par  - Retroperitoneal adenopathy, with representative para-aortic node measuring to 1.8 x 1.2 cm.\par - mesenteric adenopathy, with representative node measuring 1.6 x 1.4 cm \par - Left inguinal adenopathy measure up to 1.9 x 1.4 cm \par \par 8/5/2021 CT Chest no contrast\par - stable left apex 2.0 cm ground glass nodule with 0.4 cm solid component \par \par \par 6/29/21 U/S Venous Duplex\par -enlarged lymph nodes in the left groin 2.4 x 1.1 cm medially and 1.5 x 0.5 cm laterally. \par -there is no DVT or SVT bilaterally RLE - GSV not visualized. +torturous incompetent tributaries measuring 3.0-3.2 mm. LLE - LSV insufficiency and torturous incompetent tributaries measuring 2.7-4.5 mm. \par

## 2022-05-02 NOTE — ASSESSMENT
[FreeTextEntry1] : 76 year old female with PMHx of HLD, GERD and chronic diarrhea with no smoking history with CT Abd/Pel performed on 11/23/21 with adenopathy \par \par PET/CT performed on  12/3/21 demonstrating mildly FDG avid left greater than right axillary lymph nodes with reference 1.8 x 1.3 cm left axillary node (SUV 3.0).  \par Minimally FDG avid 2.2 x 1.3 cm ground glass nodule in the left upper lung (SUV 1.8), not significantly changed from CT on 8/5/2021.\par -  non-FDG avid retroperitoneal  1.5 x 1.2 cm  and mesenteric1 x 0.5 cm. lymph nodes. \par -Non-FDG avid right obturator node measures 1.7 x 0.7 cm  . \par -Nonspecific minimally FDG avid small and mildly enlarged bilateral inguinal with a reference 2 x 1.3 cm left inguinal node with SUV 3.9. Reference 1 cm left inguinal node with SUV 1.7\par \par s/p core biopsy of  left inguinal node performed on 2/1/22 with Dr. Brown. \par Pathology c/w CD5 positive low grade B cell lymphoma.\par CD5+ve, CD10 -ve CD 23 -ve, Cyclin D1 and SOX 11 negative \par \par   Ki67 proliferation index is 10% overall\par \par #St IV SLL\par nO B symptoms \par - Last CBC wnl \par - Send CBC / CMP anemia w/u \par - Qis to evaluate hypogammaglobulinemia \par -CLL FISH \par - discussed Velásquez transformation \par \par #Left upper lobe nodule: \par -  2.2 x 1.3 cm ground glass nodule in the left upper lung (SUV 1.8) stable from aug 2021\par -Advised to f/u with dentist to evaluate for possible infection\par -Advised to call office if she has any clinical changes \par -Will repeat CT Chest to assess size  \par -F/u in 2 months \par \par \par

## 2022-05-03 ENCOUNTER — RESULT REVIEW (OUTPATIENT)
Age: 77
End: 2022-05-03

## 2022-05-12 ENCOUNTER — NON-APPOINTMENT (OUTPATIENT)
Age: 77
End: 2022-05-12

## 2022-05-24 ENCOUNTER — NON-APPOINTMENT (OUTPATIENT)
Age: 77
End: 2022-05-24

## 2022-05-24 ENCOUNTER — TRANSCRIPTION ENCOUNTER (OUTPATIENT)
Age: 77
End: 2022-05-24

## 2022-06-02 LAB
ALBUMIN MFR SERPL ELPH: 69.8 %
ALBUMIN SERPL ELPH-MCNC: 4.6 G/DL
ALBUMIN SERPL-MCNC: 4.2 G/DL
ALBUMIN/GLOB SERPL: 2.3 RATIO
ALP BLD-CCNC: 95 U/L
ALPHA1 GLOB MFR SERPL ELPH: 4.2 %
ALPHA1 GLOB SERPL ELPH-MCNC: 0.3 G/DL
ALPHA2 GLOB MFR SERPL ELPH: 7.6 %
ALPHA2 GLOB SERPL ELPH-MCNC: 0.5 G/DL
ALT SERPL-CCNC: 21 U/L
ANION GAP SERPL CALC-SCNC: 10 MMOL/L
AST SERPL-CCNC: 18 U/L
B-GLOBULIN MFR SERPL ELPH: 9.6 %
B-GLOBULIN SERPL ELPH-MCNC: 0.6 G/DL
BILIRUB SERPL-MCNC: 0.4 MG/DL
BUN SERPL-MCNC: 15 MG/DL
CALCIUM SERPL-MCNC: 9.7 MG/DL
CHLORIDE SERPL-SCNC: 106 MMOL/L
CO2 SERPL-SCNC: 27 MMOL/L
CREAT SERPL-MCNC: 0.7 MG/DL
DEPRECATED KAPPA LC FREE/LAMBDA SER: 2.39 RATIO
EGFR: 90 ML/MIN/1.73M2
FERRITIN SERPL-MCNC: 83 NG/ML
FOLATE SERPL-MCNC: 17.6 NG/ML
GAMMA GLOB FLD ELPH-MCNC: 0.5 G/DL
GAMMA GLOB MFR SERPL ELPH: 8.8 %
GLUCOSE SERPL-MCNC: 97 MG/DL
IGA SER QL IEP: 48 MG/DL
IGG SER QL IEP: 551 MG/DL
IGM SER QL IEP: 18 MG/DL
IGVH MUTATION ANALYSIS: POSITIVE
INTERPRETATION SERPL IEP-IMP: NORMAL
IRON SATN MFR SERPL: 28 %
IRON SERPL-MCNC: 83 UG/DL
KAPPA LC CSF-MCNC: 0.64 MG/DL
KAPPA LC SERPL-MCNC: 1.53 MG/DL
M PROTEIN MFR SERPL ELPH: 2.7 %
M PROTEIN SPEC IFE-MCNC: NORMAL
MONOCLON BAND OBS SERPL: 0.2 G/DL
POTASSIUM SERPL-SCNC: 5 MMOL/L
PROT SERPL-MCNC: 6 G/DL
PROT SERPL-MCNC: 6 G/DL
PROT SERPL-MCNC: 6.1 G/DL
SODIUM SERPL-SCNC: 143 MMOL/L
TIBC SERPL-MCNC: 302 UG/DL
UIBC SERPL-MCNC: 219 UG/DL
VIT B12 SERPL-MCNC: 440 PG/ML

## 2022-06-07 ENCOUNTER — OUTPATIENT (OUTPATIENT)
Dept: OUTPATIENT SERVICES | Facility: HOSPITAL | Age: 77
LOS: 1 days | End: 2022-06-07

## 2022-06-07 ENCOUNTER — RESULT REVIEW (OUTPATIENT)
Age: 77
End: 2022-06-07

## 2022-06-07 ENCOUNTER — APPOINTMENT (OUTPATIENT)
Dept: MAMMOGRAPHY | Facility: CLINIC | Age: 77
End: 2022-06-07
Payer: MEDICARE

## 2022-06-07 ENCOUNTER — APPOINTMENT (OUTPATIENT)
Dept: CT IMAGING | Facility: CLINIC | Age: 77
End: 2022-06-07
Payer: MEDICARE

## 2022-06-07 ENCOUNTER — OUTPATIENT (OUTPATIENT)
Dept: OUTPATIENT SERVICES | Facility: HOSPITAL | Age: 77
LOS: 1 days | End: 2022-06-07
Payer: MEDICARE

## 2022-06-07 ENCOUNTER — APPOINTMENT (OUTPATIENT)
Dept: ULTRASOUND IMAGING | Facility: CLINIC | Age: 77
End: 2022-06-07
Payer: MEDICARE

## 2022-06-07 DIAGNOSIS — C85.90 NON-HODGKIN LYMPHOMA, UNSPECIFIED, UNSPECIFIED SITE: ICD-10-CM

## 2022-06-07 DIAGNOSIS — R91.1 SOLITARY PULMONARY NODULE: ICD-10-CM

## 2022-06-07 DIAGNOSIS — R92.8 OTHER ABNORMAL AND INCONCLUSIVE FINDINGS ON DIAGNOSTIC IMAGING OF BREAST: ICD-10-CM

## 2022-06-07 PROCEDURE — 77066 DX MAMMO INCL CAD BI: CPT

## 2022-06-07 PROCEDURE — G0279: CPT | Mod: 26

## 2022-06-07 PROCEDURE — 76641 ULTRASOUND BREAST COMPLETE: CPT | Mod: 26,50

## 2022-06-07 PROCEDURE — 71260 CT THORAX DX C+: CPT | Mod: 26,ME

## 2022-06-07 PROCEDURE — 71260 CT THORAX DX C+: CPT | Mod: ME

## 2022-06-07 PROCEDURE — 77066 DX MAMMO INCL CAD BI: CPT | Mod: 26

## 2022-06-07 PROCEDURE — G1004: CPT

## 2022-06-07 PROCEDURE — G0279: CPT

## 2022-06-07 PROCEDURE — 76641 ULTRASOUND BREAST COMPLETE: CPT

## 2022-06-16 ENCOUNTER — RESULT REVIEW (OUTPATIENT)
Age: 77
End: 2022-06-16

## 2022-06-16 ENCOUNTER — OUTPATIENT (OUTPATIENT)
Dept: OUTPATIENT SERVICES | Facility: HOSPITAL | Age: 77
LOS: 1 days | End: 2022-06-16
Payer: MEDICARE

## 2022-06-16 ENCOUNTER — APPOINTMENT (OUTPATIENT)
Dept: MAMMOGRAPHY | Facility: CLINIC | Age: 77
End: 2022-06-16
Payer: MEDICARE

## 2022-06-16 DIAGNOSIS — Z00.00 ENCOUNTER FOR GENERAL ADULT MEDICAL EXAMINATION WITHOUT ABNORMAL FINDINGS: ICD-10-CM

## 2022-06-16 PROCEDURE — 77065 DX MAMMO INCL CAD UNI: CPT

## 2022-06-16 PROCEDURE — 19081 BX BREAST 1ST LESION STRTCTC: CPT | Mod: RT

## 2022-06-16 PROCEDURE — 77065 DX MAMMO INCL CAD UNI: CPT | Mod: 26,RT

## 2022-06-16 PROCEDURE — 88305 TISSUE EXAM BY PATHOLOGIST: CPT

## 2022-06-16 PROCEDURE — 19081 BX BREAST 1ST LESION STRTCTC: CPT

## 2022-06-16 PROCEDURE — 88305 TISSUE EXAM BY PATHOLOGIST: CPT | Mod: 26

## 2022-06-20 ENCOUNTER — OUTPATIENT (OUTPATIENT)
Dept: OUTPATIENT SERVICES | Facility: HOSPITAL | Age: 77
LOS: 1 days | Discharge: HOME | End: 2022-06-20

## 2022-06-20 DIAGNOSIS — C85.10 UNSPECIFIED B-CELL LYMPHOMA, UNSPECIFIED SITE: ICD-10-CM

## 2022-06-23 ENCOUNTER — NON-APPOINTMENT (OUTPATIENT)
Age: 77
End: 2022-06-23

## 2022-06-25 ENCOUNTER — NON-APPOINTMENT (OUTPATIENT)
Age: 77
End: 2022-06-25

## 2022-06-27 ENCOUNTER — RESULT REVIEW (OUTPATIENT)
Age: 77
End: 2022-06-27

## 2022-06-27 ENCOUNTER — APPOINTMENT (OUTPATIENT)
Dept: HEMATOLOGY ONCOLOGY | Facility: CLINIC | Age: 77
End: 2022-06-27

## 2022-06-27 VITALS
HEIGHT: 61 IN | BODY MASS INDEX: 24.17 KG/M2 | WEIGHT: 128 LBS | HEART RATE: 91 BPM | SYSTOLIC BLOOD PRESSURE: 145 MMHG | DIASTOLIC BLOOD PRESSURE: 80 MMHG | OXYGEN SATURATION: 95 %

## 2022-06-27 PROBLEM — R92.8 ABNORMAL FINDINGS ON DIAGNOSTIC IMAGING OF BREAST: Status: ACTIVE | Noted: 2022-06-27

## 2022-06-27 LAB
BASOPHILS # BLD AUTO: 0.1 K/UL — SIGNIFICANT CHANGE UP (ref 0–0.2)
BASOPHILS NFR BLD AUTO: 1 % — SIGNIFICANT CHANGE UP (ref 0–2)
EOSINOPHIL # BLD AUTO: 0.1 K/UL — SIGNIFICANT CHANGE UP (ref 0–0.5)
EOSINOPHIL NFR BLD AUTO: 0.9 % — SIGNIFICANT CHANGE UP (ref 0–6)
HCT VFR BLD CALC: 41.2 % — SIGNIFICANT CHANGE UP (ref 34.5–45)
HGB BLD-MCNC: 13.6 G/DL — SIGNIFICANT CHANGE UP (ref 11.5–15.5)
LYMPHOCYTES # BLD AUTO: 3.2 K/UL — SIGNIFICANT CHANGE UP (ref 1–3.3)
LYMPHOCYTES # BLD AUTO: 45.2 % — HIGH (ref 13–44)
MCHC RBC-ENTMCNC: 31.2 PG — SIGNIFICANT CHANGE UP (ref 27–34)
MCHC RBC-ENTMCNC: 33 G/DL — SIGNIFICANT CHANGE UP (ref 32–36)
MCV RBC AUTO: 94.5 FL — SIGNIFICANT CHANGE UP (ref 80–100)
MONOCYTES # BLD AUTO: 0.2 K/UL — SIGNIFICANT CHANGE UP (ref 0–0.9)
MONOCYTES NFR BLD AUTO: 3.4 % — SIGNIFICANT CHANGE UP (ref 2–14)
NEUTROPHILS # BLD AUTO: 3.5 K/UL — SIGNIFICANT CHANGE UP (ref 1.8–7.4)
NEUTROPHILS NFR BLD AUTO: 49.6 % — SIGNIFICANT CHANGE UP (ref 43–77)
PLATELET # BLD AUTO: 263 K/UL — SIGNIFICANT CHANGE UP (ref 150–400)
RBC # BLD: 4.36 M/UL — SIGNIFICANT CHANGE UP (ref 3.8–5.2)
RBC # FLD: 12.7 % — SIGNIFICANT CHANGE UP (ref 10.3–14.5)
WBC # BLD: 7 K/UL — SIGNIFICANT CHANGE UP (ref 3.8–10.5)
WBC # FLD AUTO: 7 K/UL — SIGNIFICANT CHANGE UP (ref 3.8–10.5)

## 2022-06-27 PROCEDURE — 99215 OFFICE O/P EST HI 40 MIN: CPT

## 2022-06-28 ENCOUNTER — NON-APPOINTMENT (OUTPATIENT)
Age: 77
End: 2022-06-28

## 2022-06-28 ENCOUNTER — APPOINTMENT (OUTPATIENT)
Dept: SURGERY | Facility: CLINIC | Age: 77
End: 2022-06-28

## 2022-06-28 VITALS
TEMPERATURE: 98 F | BODY MASS INDEX: 24.17 KG/M2 | OXYGEN SATURATION: 98 % | DIASTOLIC BLOOD PRESSURE: 83 MMHG | HEIGHT: 61 IN | SYSTOLIC BLOOD PRESSURE: 139 MMHG | HEART RATE: 90 BPM | WEIGHT: 128 LBS

## 2022-06-28 DIAGNOSIS — R92.8 OTHER ABNORMAL AND INCONCLUSIVE FINDINGS ON DIAGNOSTIC IMAGING OF BREAST: ICD-10-CM

## 2022-06-28 DIAGNOSIS — Z86.000 PERSONAL HISTORY OF IN-SITU NEOPLASM OF BREAST: ICD-10-CM

## 2022-06-28 PROCEDURE — 99204 OFFICE O/P NEW MOD 45 MIN: CPT

## 2022-06-28 RX ORDER — DIAZEPAM 5 MG/1
5 TABLET ORAL TWICE DAILY
Qty: 10 | Refills: 0 | Status: ACTIVE | COMMUNITY
Start: 2022-06-28 | End: 1900-01-01

## 2022-06-28 NOTE — HISTORY OF PRESENT ILLNESS
[FreeTextEntry1] : re: recently diagnosed right breast DCIS; patient with friend, Liseth who took noted during the visit\par \par 77 yo postmenopausal female with hx of low grade B cell lymphoma and a medical hx of HLD, GERD presents with abnormal breast imaging demonstrating right breast calcifications. Stereotactic core biopsy of right breast demonstrated ADH/DCIS. Of note, right breast mammogram/ultrasound recommended stereotactic core biopsy (calcifications of the right breast) and ultrasound guided core biopsy of a nodule.  The nodule was benign but the calcifications were not biopsied until this recent biopsy.\par \par Of note, the patient states that after the ultrasound guided biopsy she did not return for the stereotactic core biopsy last year because of the fact that she was dealing with too many appointments and co-payments. I reassured her and explained that if she ever feels this way during the current diagnosis, work up and treatment, she should reach out to me.\par \par She denies dominant breast mass, skin changes or nipple discharge.\par \par She is under the care of Dr. Raines for low grade lymphoma.\par \par Imaging: \par 6/7//2022 Bilateral diagnostic mammogram \par Impression: Stereotactic biopsy recommended of indeterminate increasing group of microcalcifications of the right breast. BIRADS 4B\par 6/7/2022 Bilateral mammogram/ultrasound\par Impression: Stereotactic core biopsy recommended of indeterminate calcifications in the inner right breast. BIRADS 4B\par 3/2020 Benign breast biopsy (ultrasound guided)\par 2/24/2020 Bilateral diagnostic mammogram/ultrasound BIRADS 4: Indeterminate calcifications of right breast- biopsy is recommended\par \par Pathology:\par 6/16/2022 Right breast, lower inner quadrant calcifications, needle biopsy: DCIS intermediate grade with micropapillary features.\par Atypical ductal hyperplasia, associated with calcifications. \par ER 90 RI 80\par \par We reviewed and discussed pathology.   Winnie understands that the diagnosis is of right breast stage 0 breast cancer and that recommendation is for surgical management followed by adjuvant treatment.  We thoroughly discussed pathophysiology of ductal carcinoma in situ as well as treatment of surgical breast conservation v mastectomy.\par \par We discussed technical aspects of surgical management which includes left/right breast localized (needle or RANDA ) wide lumpectomy, the need for clear margins and radiation.  We discussed the need for radiation to decrease the locoregional recurrence by up to 50%.  We also discussed 5-6 weeks for standard radiation however she will discuss specifics including [place] hypofractionation with Dr. Dye.\par \par We also discussed sentinel lymph node sampling if she chooses to undergo mastectomy secondary to the chance of finding an occult breast cancer.  Technical aspects were discussed including the need for blue dye, radiotracer and axillary dissection if the nodes are positive. She also understands that a drain would be placed if an axillary dissection is required. If sentinel mapping fails. then we will await final pathology to determine need for axillary dissection.\par \par Risks v benefits of surgical options discussed as well as recommendation for adjuvant hormonal therapy with aromatase inhibitor for 5-10 years.\par \par She is leaning toward breast conservation and I have recommended she have consults with Dr. Noble and Dr. Dye from Medical Oncology and Radiation Oncology respectively.  I have discussed MRI screening and the increased sensitivity and lower specificity. MRI can help evaluate if there are any other suspicious findings prior to proceeding with breast conservation.  She understands and agrees to undergo MRI.\par \par All questions were answered.

## 2022-06-28 NOTE — ASSESSMENT
[FreeTextEntry1] : 75 yo postmenopausal female presents with microcalcifications of right  breast demonstrated on right breast imaging in 2020 for which biopsy was performed 6/2022. Right breast biopsy demonstrated atypical ductal hyperplasia as well as DCIS grade 2 ER 90% NC 80%. We discussed surgical options as well as recommendation for MRI of the breast and adjuvant treatment if either invasive carcinoma is identified or if she undergoes lumpectomy. She understands that she can also choose to undergo mastectomy +/- SLNB. \par 1. MRI of the breast (valium prescribed)\par 2. Follow up with medical oncology\par 3. Radiation Oncology- Dr Kerr\par 4. Plastic surgery consult offered- Dr Oconnell\par 5. Plan for surgery- will follow up in 1-2 weeks to review surgical plan\par 6. [facility]/

## 2022-06-28 NOTE — PHYSICAL EXAM
[Normocephalic] : normocephalic [Atraumatic] : atraumatic [EOMI] : extra ocular movement intact [PERRL] : pupils equal, round and reactive to light [Sclera nonicteric] : sclera nonicteric [Supple] : supple [No Supraclavicular Adenopathy] : no supraclavicular adenopathy [Examined in the supine and seated position] : examined in the supine and seated position [No dominant masses] : no dominant masses in right breast  [No dominant masses] : no dominant masses left breast [No Nipple Retraction] : no left nipple retraction [No Nipple Discharge] : no left nipple discharge [No Axillary Lymphadenopathy] : no left axillary lymphadenopathy [No Edema] : no edema [No Rashes] : no rashes [No Ulceration] : no ulceration [Breast Nipple Inversion] : nipples not inverted [Breast Nipple Retraction] : nipples not retracted [Breast Nipple Flattening] : nipples not flattened [Breast Nipple Fissures] : nipples not fissured [Breast Abnormal Lactation (Galactorrhea)] : no galactorrhea [Breast Abnormal Secretion Bloody Fluid] : no bloody discharge [Breast Abnormal Secretion Serous Fluid] : no serous discharge [Breast Abnormal Secretion Opalescent Fluid] : no milky discharge [de-identified] : No supraclavicular or axillary adenopathy. No dominant breast mass, normal to palpation. Everted nipple without discharge. Ecchymoses throughout the medial right breast.  [de-identified] : No supraclavicular or axillary adenopathy. No dominant breast mass, normal to palpation. Everted nipple without discharge. No skin changes.

## 2022-06-29 ENCOUNTER — RX CHANGE (OUTPATIENT)
Age: 77
End: 2022-06-29

## 2022-06-29 ENCOUNTER — NON-APPOINTMENT (OUTPATIENT)
Age: 77
End: 2022-06-29

## 2022-06-29 NOTE — RESULTS/DATA
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\par -Immunohistochemical stains   for CD3, CD5, CD20, PAX5, CD10, BCL6, BCL2, CyclinD1, Ki67, CD23, CD21, CD43, CD79a, MUM1, [id number] performed on\par  formalin fixed paraffin embedded tissue, block 1A.\par -Neoplastic cells are:  \par Positive:   CD5, CD20, PAX5, BCL-2, CD79a, CD43 (subset)\par Negative:   CD10, BCL6, CYCLIN D-1, CD23, MUM-1, LEF-1\par Other:   Ki67 proliferation index is 10% overall\par CD3 stain highlights small T cells in the background\par CD21 and CD23 stains  highlight small residual follicular dendritic cellmeshwork\par SOX11 stain is negative.\par \par -Flow cytometry analysis (02-UD-):    Flow cytometric analysis\par  attempted however, insufficient cells to report flow cytometry.  \par \par \par 12/3/21 PET/CT \par CHEST: Mildly FDG avid left greater than right axillary lymph nodes with reference 1.8 x 1.3 cm left axillary node (SUV 3.0). Mediastinum and hilar regions are unremarkable\par LUNGS: Minimally FDG avid 2.2 x 1.3 cm ground glass nodule in the left upper lung (SUV 1.8), not significantly changed from CT on 8/5/2021.\par ABDOMINOPELVIC NODES: No enlarged or FDG-avid lymph node in the abdomen or pelvis. A few non-FDG avid retroperitoneal and mesenteric lymph nodes. Reference non-FDG avid para-aortic lymph nodes measures 1.5 x 1.2 cm . Reference largest mesenteric node just posterior to the transverse colon measures 1 x 0.5 cm (image 187). Non-FDG avid right obturator node measures 1.7 x 0.7 cm (image 117). Nonspecific minimally FDG avid small and mildly enlarged bilateral inguinal with a reference 2 x 1.3\par cm left inguinal node with SUV 3.9. Reference 1 cm left inguinal node with SUV 1.7\par \par IMPRESSION:  FDG-PET/CT scan demonstrates:\par \par 1. Nonspecific mildly FDG avid bilateral axillary and bilateral mediastinal lymph nodes. The left\par inguinal node is amenable to ultrasound-guided FNA biopsy as indicated.\par \par 2. Nonspecific FDG avidity in the distal third of the esophagus with questionable wall thickening on\par CT. This may be further evaluated with endoscopy as warranted.\par \par 3. Nonspecific minimally FDG avid groundglass nodule in the left upper lung, unchanged on CT from\par 8/5/2021. This is nonspecific. Malignancy is with low-grade FDG activity assessed as\par adenocarcinoma in situ may present in a similar fashion.\par \par 4. Non-FDG avid retroperitoneal, pelvic and mesenteric lymph nodes.\par \par \par \par \par 11/23/21 CT Abd/Pel\par  - Retroperitoneal adenopathy, with representative para-aortic node measuring to 1.8 x 1.2 cm.\par - mesenteric adenopathy, with representative node measuring 1.6 x 1.4 cm \par - Left inguinal adenopathy measure up to 1.9 x 1.4 cm \par \par 8/5/2021 CT Chest no contrast\par - stable left apex 2.0 cm ground glass nodule with 0.4 cm solid component \par \par \par 6/29/21 U/S Venous Duplex\par -enlarged lymph nodes in the left groin 2.4 x 1.1 cm medially and 1.5 x 0.5 cm laterally. \par -there is no DVT or SVT bilaterally RLE - GSV not visualized. +torturous incompetent tributaries measuring 3.0-3.2 mm. LLE - LSV insufficiency and torturous incompetent tributaries measuring 2.7-4.5 mm. \par

## 2022-06-29 NOTE — ASSESSMENT
[FreeTextEntry1] : 76 year old female with PMHx of HLD, GERD and chronic diarrhea with no smoking history with CT Abd/Pel performed on 11/23/21 with adenopathy \par \par PET/CT performed on  12/3/21 demonstrating mildly FDG avid left greater than right axillary lymph nodes with reference 1.8 x 1.3 cm left axillary node (SUV 3.0).  \par Minimally FDG avid 2.2 x 1.3 cm ground glass nodule in the left upper lung (SUV 1.8), not significantly changed from CT on 8/5/2021.\par -  non-FDG avid retroperitoneal  1.5 x 1.2 cm  and mesenteric1 x 0.5 cm. lymph nodes. \par -Non-FDG avid right obturator node measures 1.7 x 0.7 cm  . \par -Nonspecific minimally FDG avid small and mildly enlarged bilateral inguinal with a reference 2 x 1.3 cm left inguinal node with SUV 3.9. Reference 1 cm left inguinal node with SUV 1.7\par \par s/p core biopsy of  left inguinal node performed on 2/1/22 with Dr. Brown. \par Pathology c/w CD5 positive low grade B cell lymphoma.\par CD5+ve, CD10 -ve CD 23 -ve, Cyclin D1 and SOX 11 negative \par \par Ki67 proliferation index is 10% overall\par \par #St IV SLL\par nO B symptoms \par - Last CBC wnl \par - Send CBC / CMP anemia w/u \par - slightly low IgG Ig A and IgM , no infections M spike 0.2  IgG kappa Band identified \par -CLL FISH: 12q del, Igvh mutated\par - discussed Velásquez transformation \par \par #Left upper lobe nodule: \par -  2.2 x 1.3 cm ground glass nodule in the left upper lung (SUV 1.8) stable from aug 2021\par CT CHEST: Stable left upper lobe 2 cm groundglass nodule with 0.4 cm solid component. \par - Advised to f/u with dentist to evaluate for possible infection\par - will discuss with DR lin\par - f/u in 2-3 months\par \par #DCIS: \par She recently had a biopsy of Right inner quadrant of breast Path C/w DCIS intermediate nuclear grade, cribriform and micropapillary, with calcifications and without necrosis ER 90%, [place] 80 % \par ADH associated with calcification\par Discussed with patient DCIS/ ADH. She is seeing Dr Rahman tomorrow \par - Discussed course of treatment with Surgery/ +- RT based on age/ AI \par - no ho Arthritis. She had a DEXA scan done a few years ago WIll repeat \par -S/p Hysterectomy in 1993\par \par F/u in 2 months\par

## 2022-06-29 NOTE — HISTORY OF PRESENT ILLNESS
[de-identified] : 76 year old female with PMHx of HLD, GERD and chronic diarrhea, hx of hysterectomy here for initial visit referred by Pulmonologist, Dr. Shields. \par Patient presented to vascular specialist, Dr. Rubio, in June 2021 for routine follow up for venous insufficiency. Venous doppler performed on 6/29/21 demonstrated enlarged lymph nodes in the left groin 2.4 x 1.1 cm medially and 1.5 x 0.5 cm laterally. \par Subsequent CT Abd/Pel performed on 11/23/21 revealing retroperitoneal adenopathy, with representative para-aortic node measuring to 1.8 x 1.2 cm. Mesenteric adenopathy, with representative node measuring 1.6 x 1.4 cm and  left inguinal adenopathy measure up to 1.9 x 1.4 cm.\par PET/CT performed on  12/3/21 demonstrating mildly FDG avid left greater than right axillary lymph nodes with reference 1.8 x 1.3 cm left axillary node (SUV 3.0).  Minimally FDG avid 2.2 x 1.3 cm ground glass nodule in the left upper lung (SUV 1.8), not significantly changed from CT on 8/5/2021.No enlarged or FDG-avid lymph node in the abdomen or pelvis. A few non-FDG avid retroperitoneal and mesenteric lymph nodes. Reference non-FDG avid para-aortic lymph nodes measures 1.5 x 1.2 cm . Reference largest mesenteric node just posterior to the transverse colon measures 1 x 0.5 cm. Non-FDG avid right obturator node measures 1.7 x 0.7 cm  . Nonspecific minimally FDG avid small and mildly enlarged bilateral inguinal with a reference 2 x 1.3 cm left inguinal node with SUV 3.9. Reference 1 cm left inguinal node with SUV 1.7. CT CHEST on 7/1/20; 1.4 x 1.3 cm ground glass and solid density in the left uppper lobe apex with adjacent 1.0 x 0.4cm pleural-based thickening \par \par \par Patient evaluated by Dr. Camacho on 12/9/21 and it was decided to move forward with core biopsy. Biopsy of left inguinal node performed on 2/1/22 with Dr. Brown. 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  \par \par \par  [de-identified] : Patient presents for a f/u \par She recently had a biopsy of Right inner quadrant of breast Path C/w DCIS intermediate nuclear grade, cribriform and micropapillary, with calcifications and without necrosis ER 90%, MA 80 % \par ADH associated with calcification\par Discussed with patient DCIS/ ADH. She is seeing Dr Rahman tomorrow \par Patient had a CT Chest on 6/7/22:  Stable left upper lobe 2 cm ground-glass nodule with 0.4 cm solid component. Follow-up is recommended.\par \par No B symptoms. Former smoker 1/2 a day for 10-15 years. Quit at age 55 about 20 years ago \par \par

## 2022-07-06 ENCOUNTER — OUTPATIENT (OUTPATIENT)
Dept: OUTPATIENT SERVICES | Facility: HOSPITAL | Age: 77
LOS: 1 days | End: 2022-07-06
Payer: MEDICARE

## 2022-07-06 ENCOUNTER — APPOINTMENT (OUTPATIENT)
Dept: MRI IMAGING | Facility: CLINIC | Age: 77
End: 2022-07-06

## 2022-07-06 DIAGNOSIS — D05.11 INTRADUCTAL CARCINOMA IN SITU OF RIGHT BREAST: ICD-10-CM

## 2022-07-06 PROCEDURE — C8937: CPT

## 2022-07-06 PROCEDURE — C8908: CPT

## 2022-07-06 PROCEDURE — 77049 MRI BREAST C-+ W/CAD BI: CPT | Mod: 26

## 2022-07-12 ENCOUNTER — APPOINTMENT (OUTPATIENT)
Dept: SURGERY | Facility: CLINIC | Age: 77
End: 2022-07-12

## 2022-07-12 VITALS
TEMPERATURE: 97.7 F | BODY MASS INDEX: 23.6 KG/M2 | WEIGHT: 125 LBS | DIASTOLIC BLOOD PRESSURE: 79 MMHG | OXYGEN SATURATION: 94 % | HEIGHT: 61 IN | HEART RATE: 93 BPM | SYSTOLIC BLOOD PRESSURE: 144 MMHG

## 2022-07-12 PROCEDURE — 99215 OFFICE O/P EST HI 40 MIN: CPT

## 2022-07-13 ENCOUNTER — APPOINTMENT (OUTPATIENT)
Dept: RADIATION ONCOLOGY | Facility: CLINIC | Age: 77
End: 2022-07-13

## 2022-07-13 ENCOUNTER — OUTPATIENT (OUTPATIENT)
Dept: OUTPATIENT SERVICES | Facility: HOSPITAL | Age: 77
LOS: 1 days | Discharge: HOME | End: 2022-07-13

## 2022-07-13 VITALS
SYSTOLIC BLOOD PRESSURE: 124 MMHG | WEIGHT: 139 LBS | RESPIRATION RATE: 15 BRPM | BODY MASS INDEX: 26.26 KG/M2 | DIASTOLIC BLOOD PRESSURE: 74 MMHG | HEART RATE: 81 BPM

## 2022-07-13 DIAGNOSIS — Z78.9 OTHER SPECIFIED HEALTH STATUS: ICD-10-CM

## 2022-07-13 DIAGNOSIS — Z80.9 FAMILY HISTORY OF MALIGNANT NEOPLASM, UNSPECIFIED: ICD-10-CM

## 2022-07-13 DIAGNOSIS — Z80.0 FAMILY HISTORY OF MALIGNANT NEOPLASM OF DIGESTIVE ORGANS: ICD-10-CM

## 2022-07-13 DIAGNOSIS — Z80.3 FAMILY HISTORY OF MALIGNANT NEOPLASM OF BREAST: ICD-10-CM

## 2022-07-13 PROCEDURE — 99204 OFFICE O/P NEW MOD 45 MIN: CPT | Mod: 25

## 2022-07-13 NOTE — LETTER GREETING
[Dear Doctor] : Dear Doctor, [Consult Letter:] : Your patient, [unfilled] was seen in my office today for consultation. [Please see my note below.] : Please see my note below. [FreeTextEntry2] : Viri Rahman MD\alvin Mcqueen MD

## 2022-07-13 NOTE — LETTER CLOSING
[Consult Closing:] : Thank you for allowing me to participate in the care of this patient.  If you have any questions, please do not hesitate to contact me. [Sincerely yours,] : Sincerely yours,

## 2022-07-13 NOTE — HISTORY OF PRESENT ILLNESS
[FreeTextEntry1] : This 76 year-old female presents for radiation therapy consultation.  Ms. Hendricks was diagnosed with Right breast atypical ductal hyperplasia as well as DCIS grade 2, ER 90%, NH 80% on 6/16/2022.\par \par Microcalcifications of patient's right breast were demonstrated on imaging in 2020 for which biopsy was performed 6/16/2022. Right breast biopsy demonstrated atypical ductal hyperplasia as well as DCIS grade 2 ER 90% NH 80%.  She is planning for RIGHT breast wide lumpectomy with RANDA  with Dr. Rahman.\par \par Of note, Ms. Hendricks is currently under the care of Dr. Raines for low grade B-cell lymphoma.

## 2022-07-13 NOTE — PHYSICAL EXAM
[Normal] : normal skin color and pigmentation and no rash [de-identified] : 40D cup size, no palpable masses. [de-identified] : B/L lower extremity edema with stasis derm [de-identified] : B/L lower extremity stasis dermatitis

## 2022-07-13 NOTE — REVIEW OF SYSTEMS
[Lower Ext Edema] : lower extremity edema [Negative] : Allergic/Immunologic [FreeTextEntry5] : Follows with vascular -- Dr. Polo [de-identified] : stasis dermatitis BLE [de-identified] : low grade B-cell lymphoma, b/l lower extremity edema

## 2022-07-13 NOTE — OB/GYN HISTORY
[History of Hormone Replacement Therapy] : a history of hormone replacement therapy [Currently In Menopause] : currently in menopause [___] : Full Term: [unfilled]

## 2022-07-21 ENCOUNTER — APPOINTMENT (OUTPATIENT)
Dept: ULTRASOUND IMAGING | Facility: CLINIC | Age: 77
End: 2022-07-21

## 2022-07-21 ENCOUNTER — RESULT REVIEW (OUTPATIENT)
Age: 77
End: 2022-07-21

## 2022-07-21 ENCOUNTER — OUTPATIENT (OUTPATIENT)
Dept: OUTPATIENT SERVICES | Facility: HOSPITAL | Age: 77
LOS: 1 days | End: 2022-07-21
Payer: MEDICARE

## 2022-07-21 DIAGNOSIS — Z00.00 ENCOUNTER FOR GENERAL ADULT MEDICAL EXAMINATION WITHOUT ABNORMAL FINDINGS: ICD-10-CM

## 2022-07-21 PROCEDURE — 88305 TISSUE EXAM BY PATHOLOGIST: CPT | Mod: 26

## 2022-07-21 PROCEDURE — 76942 ECHO GUIDE FOR BIOPSY: CPT | Mod: 26

## 2022-07-21 PROCEDURE — 88360 TUMOR IMMUNOHISTOCHEM/MANUAL: CPT | Mod: 26

## 2022-07-21 PROCEDURE — 38505 NEEDLE BIOPSY LYMPH NODES: CPT | Mod: RT

## 2022-07-21 PROCEDURE — 88341 IMHCHEM/IMCYTCHM EA ADD ANTB: CPT | Mod: 26,59

## 2022-07-21 PROCEDURE — 88342 IMHCHEM/IMCYTCHM 1ST ANTB: CPT | Mod: 26,59

## 2022-07-22 PROCEDURE — 19083 BX BREAST 1ST LESION US IMAG: CPT

## 2022-07-22 PROCEDURE — 88305 TISSUE EXAM BY PATHOLOGIST: CPT

## 2022-07-22 PROCEDURE — A4648: CPT

## 2022-07-22 PROCEDURE — 88342 IMHCHEM/IMCYTCHM 1ST ANTB: CPT

## 2022-07-22 PROCEDURE — 88189 FLOWCYTOMETRY/READ 16 & >: CPT

## 2022-07-22 PROCEDURE — 88341 IMHCHEM/IMCYTCHM EA ADD ANTB: CPT

## 2022-07-22 PROCEDURE — 38505 NEEDLE BIOPSY LYMPH NODES: CPT

## 2022-07-22 PROCEDURE — 87205 SMEAR GRAM STAIN: CPT

## 2022-07-22 PROCEDURE — 76942 ECHO GUIDE FOR BIOPSY: CPT

## 2022-07-22 PROCEDURE — 88360 TUMOR IMMUNOHISTOCHEM/MANUAL: CPT

## 2022-07-22 PROCEDURE — 88185 FLOWCYTOMETRY/TC ADD-ON: CPT

## 2022-08-13 NOTE — HISTORY OF PRESENT ILLNESS
[FreeTextEntry1] : re: recently diagnosed right breast DCIS\par \par 77 yo postmenopausal female with hx of low grade B cell lymphoma and a medical hx of HLD, GERD presents with abnormal breast imaging demonstrating right breast calcifications. Stereotactic core biopsy of right breast demonstrated ADH/DCIS. Of note, right breast mammogram/ultrasound recommended stereotactic core biopsy (calcifications of the right breast) and ultrasound guided core biopsy of a nodule.  The nodule was benign but the calcifications were not biopsied until this recent biopsy.\par \par Of note, the patient states that after the ultrasound guided biopsy she did not return for the stereotactic core biopsy last year because of the fact that she was dealing with too many appointments and co-payments. I reassured her and explained that if she ever feels this way during the current diagnosis, work up and treatment, she should reach out to me.\par \par She denies dominant breast mass, skin changes or nipple discharge.\par \par She is under the care of Dr. Raines for low grade lymphoma.\par \par Imaging: \par 7/6/22 breast MR bilat with CAD impression-inner right breast associated with 2.1cm non mass enhancement corresponding to the site of recent biopsy proven malignancy. No additional suspicious enhancing findings in the right breast or in the contralateral left breast.\par \par 6/7//2022 Bilateral diagnostic mammogram \par Impression: Stereotactic biopsy recommended of indeterminate increasing group of microcalcifications of the right breast. BIRADS 4B\par 6/7/2022 Bilateral mammogram/ultrasound\par Impression: Stereotactic core biopsy recommended of indeterminate calcifications in the inner right breast. BIRADS 4B\par 3/2020 Benign breast biopsy (ultrasound guided)\par 2/24/2020 Bilateral diagnostic mammogram/ultrasound BIRADS 4: Indeterminate calcifications of right breast- biopsy is recommended\par \par Pathology:\par 6/16/2022 Right breast, lower inner quadrant calcifications, needle biopsy: DCIS intermediate grade with micropapillary features.\par Atypical ductal hyperplasia, associated with calcifications. \par ER 90 MO 80\par \par We reviewed and discussed pathology.   Winnie understands that the diagnosis is of right breast stage 0 breast cancer and that recommendation is for surgical management followed by adjuvant treatment.  We thoroughly discussed pathophysiology of ductal carcinoma in situ as well as treatment of surgical breast conservation v mastectomy.\par \par We discussed technical aspects of surgical management which includes left/right breast localized (needle or RANDA ) wide lumpectomy, the need for clear margins and radiation.  We discussed the need for radiation to decrease the locoregional recurrence by up to 50%.  We also discussed 5-6 weeks for standard radiation however she will discuss specifics including [place] hypofractionation with Dr. Dye.\par \par We also discussed sentinel lymph node sampling if she chooses to undergo mastectomy secondary to the chance of finding an occult breast cancer.  Technical aspects were discussed including the need for blue dye, radiotracer and axillary dissection if the nodes are positive. She also understands that a drain would be placed if an axillary dissection is required. If sentinel mapping fails. then we will await final pathology to determine need for axillary dissection.\par \par Risks v benefits of surgical options discussed as well as recommendation for adjuvant hormonal therapy with aromatase inhibitor for 5-10 years.\par \par Plan for Right breast wide lumpectomy with RANDA .\par \par All questions were answered.

## 2022-08-13 NOTE — PHYSICAL EXAM
[Normocephalic] : normocephalic [Atraumatic] : atraumatic [EOMI] : extra ocular movement intact [PERRL] : pupils equal, round and reactive to light [Sclera nonicteric] : sclera nonicteric [Supple] : supple [No Supraclavicular Adenopathy] : no supraclavicular adenopathy [Examined in the supine and seated position] : examined in the supine and seated position [No dominant masses] : no dominant masses in right breast  [No dominant masses] : no dominant masses left breast [No Nipple Retraction] : no left nipple retraction [No Nipple Discharge] : no left nipple discharge [No Axillary Lymphadenopathy] : no left axillary lymphadenopathy [No Edema] : no edema [No Rashes] : no rashes [No Ulceration] : no ulceration [Breast Nipple Inversion] : nipples not inverted [Breast Nipple Retraction] : nipples not retracted [Breast Nipple Flattening] : nipples not flattened [Breast Nipple Fissures] : nipples not fissured [Breast Abnormal Lactation (Galactorrhea)] : no galactorrhea [Breast Abnormal Secretion Bloody Fluid] : no bloody discharge [Breast Abnormal Secretion Opalescent Fluid] : no milky discharge [Breast Abnormal Secretion Serous Fluid] : no serous discharge [de-identified] : No supraclavicular or axillary adenopathy. No dominant breast mass, normal to palpation. Everted nipple without discharge. No skin changes. [de-identified] : No supraclavicular or axillary adenopathy. No dominant breast mass, normal to palpation. Everted nipple without discharge. Ecchymoses throughout the medial right breast.

## 2022-08-13 NOTE — ASSESSMENT
[FreeTextEntry1] : 75 yo postmenopausal female presents with microcalcifications of right  breast demonstrated on right breast imaging in 2020 for which biopsy was performed 6/2022. Right breast biopsy demonstrated atypical ductal hyperplasia as well as DCIS grade 2 ER 90% [place] 80%. Plan for RIGHT breast wide lumpectomy with RANDA .\par 1. RIGHT axillary ultrasound with possible biopsy\par 2. RIGHT breast wide lumpectomy with RANDA \par 3. Follow up with medical oncology Dr Raines\par 4. Radiation Oncology- Dr Kerr\par

## 2022-08-15 ENCOUNTER — OUTPATIENT (OUTPATIENT)
Dept: OUTPATIENT SERVICES | Facility: HOSPITAL | Age: 77
LOS: 1 days | End: 2022-08-15
Payer: MEDICARE

## 2022-08-15 VITALS
OXYGEN SATURATION: 95 % | RESPIRATION RATE: 20 BRPM | DIASTOLIC BLOOD PRESSURE: 76 MMHG | HEART RATE: 76 BPM | TEMPERATURE: 98 F | SYSTOLIC BLOOD PRESSURE: 116 MMHG | HEIGHT: 61 IN | WEIGHT: 127.21 LBS

## 2022-08-15 DIAGNOSIS — Z90.710 ACQUIRED ABSENCE OF BOTH CERVIX AND UTERUS: Chronic | ICD-10-CM

## 2022-08-15 DIAGNOSIS — D05.11 INTRADUCTAL CARCINOMA IN SITU OF RIGHT BREAST: ICD-10-CM

## 2022-08-15 DIAGNOSIS — Z29.9 ENCOUNTER FOR PROPHYLACTIC MEASURES, UNSPECIFIED: ICD-10-CM

## 2022-08-15 DIAGNOSIS — Z01.818 ENCOUNTER FOR OTHER PREPROCEDURAL EXAMINATION: ICD-10-CM

## 2022-08-15 LAB
A1C WITH ESTIMATED AVERAGE GLUCOSE RESULT: 5.4 % — SIGNIFICANT CHANGE UP (ref 4–5.6)
ALBUMIN SERPL ELPH-MCNC: 4.5 G/DL — SIGNIFICANT CHANGE UP (ref 3.3–5.2)
ALP SERPL-CCNC: 85 U/L — SIGNIFICANT CHANGE UP (ref 40–120)
ALT FLD-CCNC: 13 U/L — SIGNIFICANT CHANGE UP
ANION GAP SERPL CALC-SCNC: 9 MMOL/L — SIGNIFICANT CHANGE UP (ref 5–17)
APTT BLD: 32 SEC — SIGNIFICANT CHANGE UP (ref 27.5–35.5)
AST SERPL-CCNC: 17 U/L — SIGNIFICANT CHANGE UP
BASOPHILS # BLD AUTO: 0.05 K/UL — SIGNIFICANT CHANGE UP (ref 0–0.2)
BASOPHILS NFR BLD AUTO: 0.9 % — SIGNIFICANT CHANGE UP (ref 0–2)
BILIRUB SERPL-MCNC: 0.5 MG/DL — SIGNIFICANT CHANGE UP (ref 0.4–2)
BLD GP AB SCN SERPL QL: SIGNIFICANT CHANGE UP
BUN SERPL-MCNC: 16.8 MG/DL — SIGNIFICANT CHANGE UP (ref 8–20)
CALCIUM SERPL-MCNC: 9 MG/DL — SIGNIFICANT CHANGE UP (ref 8.4–10.5)
CHLORIDE SERPL-SCNC: 105 MMOL/L — SIGNIFICANT CHANGE UP (ref 98–107)
CO2 SERPL-SCNC: 28 MMOL/L — SIGNIFICANT CHANGE UP (ref 22–29)
CREAT SERPL-MCNC: 0.51 MG/DL — SIGNIFICANT CHANGE UP (ref 0.5–1.3)
EGFR: 97 ML/MIN/1.73M2 — SIGNIFICANT CHANGE UP
EOSINOPHIL # BLD AUTO: 0.05 K/UL — SIGNIFICANT CHANGE UP (ref 0–0.5)
EOSINOPHIL NFR BLD AUTO: 0.9 % — SIGNIFICANT CHANGE UP (ref 0–6)
ESTIMATED AVERAGE GLUCOSE: 108 MG/DL — SIGNIFICANT CHANGE UP (ref 68–114)
GLUCOSE SERPL-MCNC: 100 MG/DL — HIGH (ref 70–99)
HCT VFR BLD CALC: 39.4 % — SIGNIFICANT CHANGE UP (ref 34.5–45)
HGB BLD-MCNC: 13.2 G/DL — SIGNIFICANT CHANGE UP (ref 11.5–15.5)
IMM GRANULOCYTES NFR BLD AUTO: 0.2 % — SIGNIFICANT CHANGE UP (ref 0–1.5)
INR BLD: 0.91 RATIO — SIGNIFICANT CHANGE UP (ref 0.88–1.16)
LYMPHOCYTES # BLD AUTO: 2.56 K/UL — SIGNIFICANT CHANGE UP (ref 1–3.3)
LYMPHOCYTES # BLD AUTO: 47.3 % — HIGH (ref 13–44)
MCHC RBC-ENTMCNC: 31.6 PG — SIGNIFICANT CHANGE UP (ref 27–34)
MCHC RBC-ENTMCNC: 33.5 GM/DL — SIGNIFICANT CHANGE UP (ref 32–36)
MCV RBC AUTO: 94.3 FL — SIGNIFICANT CHANGE UP (ref 80–100)
MONOCYTES # BLD AUTO: 0.18 K/UL — SIGNIFICANT CHANGE UP (ref 0–0.9)
MONOCYTES NFR BLD AUTO: 3.3 % — SIGNIFICANT CHANGE UP (ref 2–14)
NEUTROPHILS # BLD AUTO: 2.56 K/UL — SIGNIFICANT CHANGE UP (ref 1.8–7.4)
NEUTROPHILS NFR BLD AUTO: 47.4 % — SIGNIFICANT CHANGE UP (ref 43–77)
PLATELET # BLD AUTO: 247 K/UL — SIGNIFICANT CHANGE UP (ref 150–400)
POTASSIUM SERPL-MCNC: 4.4 MMOL/L — SIGNIFICANT CHANGE UP (ref 3.5–5.3)
POTASSIUM SERPL-SCNC: 4.4 MMOL/L — SIGNIFICANT CHANGE UP (ref 3.5–5.3)
PROT SERPL-MCNC: 6.4 G/DL — LOW (ref 6.6–8.7)
PROTHROM AB SERPL-ACNC: 10.5 SEC — SIGNIFICANT CHANGE UP (ref 10.5–13.4)
RBC # BLD: 4.18 M/UL — SIGNIFICANT CHANGE UP (ref 3.8–5.2)
RBC # FLD: 14.2 % — SIGNIFICANT CHANGE UP (ref 10.3–14.5)
SODIUM SERPL-SCNC: 141 MMOL/L — SIGNIFICANT CHANGE UP (ref 135–145)
WBC # BLD: 5.41 K/UL — SIGNIFICANT CHANGE UP (ref 3.8–10.5)
WBC # FLD AUTO: 5.41 K/UL — SIGNIFICANT CHANGE UP (ref 3.8–10.5)

## 2022-08-15 PROCEDURE — G0463: CPT

## 2022-08-15 PROCEDURE — 93005 ELECTROCARDIOGRAM TRACING: CPT

## 2022-08-15 PROCEDURE — 93010 ELECTROCARDIOGRAM REPORT: CPT

## 2022-08-15 NOTE — H&P PST ADULT - PROBLEM SELECTOR PLAN 2
preop assessment, medical clearance pending, right breast [place]  wide lumpectomy w/Dr Rahman scheduled for 8/26/2022

## 2022-08-15 NOTE — H&P PST ADULT - HISTORY OF PRESENT ILLNESS
77 yo F PMH of HLD, GERD, low grade B cell lymphoma, and recently diagnosed right breast DCIS presents with c/o abnormal breast imaging demonstrating right breast calcifications. Stereotactic core biopsy of right breast demonstrated ADH/DCIS. Patient denies dominant breast mass, skin changes or nipple discharge. Preop assessment prior to right breast [place]  wide lumpectomy w/Dr Rahman scheduled for 2022    [email or web address] Bilateral diagnostic mammogram impression: Stereotactic biopsy recommended of indeterminate increasing group of microcalcifications of the right breast. BIRADS 4B  2022 Bilateral mammogram/ultrasound impression: Stereotactic core biopsy recommended of indeterminate calcifications in the inner right breast. BIRADS 4B  3/2020 Benign breast biopsy (ultrasound guided)  2020 Bilateral diagnostic mammogram/ultrasound BIRADS 4: Indeterminate calcifications of right breast, biopsy is recommended    Pathology:  2022 Right breast, lower inner quadrant calcifications, needle biopsy: DCIS intermediate grade with micropapillary features. Atypical ductal hyperplasia, associated with calcifications. ER 90 PA 80     75 yo F PMH of HLD, GERD, low grade B cell lymphoma, and recently diagnosed right breast DCIS presents with c/o abnormal breast imaging demonstrating right breast calcifications. Stereotactic core biopsy of right breast demonstrated ADH/DCIS. Patient denies dominant breast mass, breast tenderness, skin changes or nipple discharge. Preop assessment prior to right breast [place]  wide lumpectomy w/Dr Rahman scheduled for 2022    [email or web address] Bilateral diagnostic mammogram impression: Stereotactic biopsy recommended of indeterminate increasing group of microcalcifications of the right breast. BIRADS 4B  2022 Bilateral mammogram/ultrasound impression: Stereotactic core biopsy recommended of indeterminate calcifications in the inner right breast. BIRADS 4B  3/2020 Benign breast biopsy (ultrasound guided)  2020 Bilateral diagnostic mammogram/ultrasound BIRADS 4: Indeterminate calcifications of right breast, biopsy is recommended    Pathology:  2022 Right breast, lower inner quadrant calcifications, needle biopsy: DCIS intermediate grade with micropapillary features. Atypical ductal hyperplasia, associated with calcifications. ER 90 [place] 80

## 2022-08-15 NOTE — H&P PST ADULT - NEUROLOGICAL
normal/cranial nerves II-XII intact/sensation intact negative normal/cranial nerves II-XII intact/sensation intact/responds to verbal commands/cranial nerves intact/no spontaneous movement

## 2022-08-15 NOTE — H&P PST ADULT - CARDIOVASCULAR
normal/regular rate and rhythm/S1 S2 present/no gallops/no rub/no murmur details… normal/regular rate and rhythm/S1 S2 present/no gallops/no rub/no murmur/pedal edema normal/regular rate and rhythm/S1 S2 present/no gallops/no rub/no murmur/no JVD/pedal edema

## 2022-08-15 NOTE — H&P PST ADULT - NSICDXPASTSURGICALHX_GEN_ALL_CORE_FT
PAST SURGICAL HISTORY:  No significant past surgical history      PAST SURGICAL HISTORY:  H/O total hysterectomy

## 2022-08-15 NOTE — H&P PST ADULT - NSICDXPASTMEDICALHX_GEN_ALL_CORE_FT
PAST MEDICAL HISTORY:  Hyperlipidemia     Intraductal carcinoma in situ of right breast     Lymphoma      PAST MEDICAL HISTORY:  GERD (gastroesophageal reflux disease)     Hyperlipidemia     Intraductal carcinoma in situ of right breast     Lymphoma

## 2022-08-15 NOTE — H&P PST ADULT - GASTROINTESTINAL
normal/soft/nontender/nondistended/normal active bowel sounds negative details… normal/soft/nontender/nondistended/normal active bowel sounds/no guarding/no masses palpable

## 2022-08-15 NOTE — H&P PST ADULT - ASSESSMENT
77 yo F PMH of HLD, GERD, low grade B cell lymphoma, and recently diagnosed right breast DCIS presents with c/o abnormal breast imaging demonstrating right breast calcifications. Stereotactic core biopsy of right breast demonstrated ADH/DCIS. Patient denies dominant breast mass, skin changes or nipple discharge. Preop assessment prior to right breast [place]  wide lumpectomy w/Dr Rahman scheduled for 2022    Pt was educated on preop preparation with written and verbal instructions. Pt was informed to obtain clearance >3 days before surgery. Pt was educated on NSAIDs, multivitamins and herbals that increase the risk of bleeding and need to be stopped 7 days before procedure. Pt was educated on covid testing and covid prevention, i.e. social distancing, handwashing, mask wearing. Pt verbalized understanding of the above.     OPIOID RISK TOOL    SHARONDA EACH BOX THAT APPLIES AND ADD TOTALS AT THE END    FAMILY HISTORY OF SUBSTANCE ABUSE                 FEMALE         MALE                                                Alcohol                             [  ]1 pt          [  ]3pts                                               Illegal Durgs                     [  ]2 pts        [  ]3pts                                               Rx Drugs                           [  ]4 pts        [  ]4 pts    PERSONAL HISTORY OF SUBSTANCE ABUSE                                                                                          Alcohol                             [  ]3 pts       [  ]3 pts                                               Illegal Drugs                     [  ]4 pts        [  ]4 pts                                               Rx Drugs                           [  ]5 pts        [  ]5 pts    AGE BETWEEN 16-45 YEARS                                      [  ]1 pt         [  ]1 pt    HISTORY OF PREADOLESCENT   SEXUAL ABUSE                                                             [  ]3 pts        [  ]0pts    PSYCHOLOGICAL DISEASE                     ADD, OCD, Bipolar, Schizophrenia        [  ]2 pts         [  ]2 pts                      Depression                                               [  ]1 pt           [  ]1 pt           SCORING TOTAL   (add numbers and type here)              ( 0 )                                     A score of 3 or lower indicated LOW risk for future opioid abuse  A score of 4 to 7 indicated moderate risk for future opioid abuse  A score of 8 or higher indicates a high risk for opioid abuse    CAPRINI VTE 2.0 SCORE [CLOT updated 2019]    AGE RELATED RISK FACTORS                                                       MOBILITY RELATED FACTORS  [ ] Age 41-60 years                                            (1 Point)                    [ ] Bed rest                                                        (1 Point)  [ ] Age: 61-74 years                                           (2 Points)                  [ ] Plaster cast                                                   (2 Points)  [x ] Age= 75 years                                              (3 Points)                    [ ] Bed bound for more than 72 hours                 (2 Points)    DISEASE RELATED RISK FACTORS                                               GENDER SPECIFIC FACTORS  [ ] Edema in the lower extremities                       (1 Point)              [ ] Pregnancy                                                     (1 Point)  [ ] Varicose veins                                               (1 Point)                     [ ] Post-partum < 6 weeks                                   (1 Point)             [ ] BMI > 25 Kg/m2                                            (1 Point)                     [ ] Hormonal therapy  or oral contraception          (1 Point)                 [ ] Sepsis (in the previous month)                        (1 Point)               [ ] History of pregnancy complications                 (1 point)  [ ] Pneumonia or serious lung disease                                               [ ] Unexplained or recurrent                     (1 Point)           (in the previous month)                               (1 Point)  [ ] Abnormal pulmonary function test                     (1 Point)                 SURGERY RELATED RISK FACTORS  [ ] Acute myocardial infarction                              (1 Point)               [ ]  Section                                             (1 Point)  [ ] Congestive heart failure (in the previous month)  (1 Point)      [ ] Minor surgery                                                  (1 Point)   [ ] Inflammatory bowel disease                             (1 Point)               [ ] Arthroscopic surgery                                        (2 Points)  [ ] Central venous access                                      (2 Points)                [x ] General surgery lasting more than 45 minutes (2 points)  [x ] Malignancy- Present or previous                   (2 Points)                [ ] Elective arthroplasty                                         (5 points)    [ ] Stroke (in the previous month)                          (5 Points)                                                                                                                                                           HEMATOLOGY RELATED FACTORS                                                 TRAUMA RELATED RISK FACTORS  [ ] Prior episodes of VTE                                     (3 Points)                [ ] Fracture of the hip, pelvis, or leg                       (5 Points)  [ ] Positive family history for VTE                         (3 Points)             [ ] Acute spinal cord injury (in the previous month)  (5 Points)  [ ] Prothrombin 46034 A                                     (3 Points)               [ ] Paralysis  (less than 1 month)                             (5 Points)  [ ] Factor V Leiden                                             (3 Points)                  [ ] Multiple Trauma within 1 month                        (5 Points)  [ ] Lupus anticoagulants                                     (3 Points)                                                           [ ] Anticardiolipin antibodies                               (3 Points)                                                       [ ] High homocysteine in the blood                      (3 Points)                                             [ ] Other congenital or acquired thrombophilia      (3 Points)                                                [ ] Heparin induced thrombocytopenia                  (3 Points)                                     Total Score [     7     ] 75 yo F PMH of HLD, GERD, low grade B cell lymphoma, and recently diagnosed right breast DCIS presents with c/o abnormal breast imaging demonstrating right breast calcifications. Stereotactic core biopsy of right breast demonstrated ADH/DCIS. Patient denies dominant breast mass, beast tenderness, skin changes or nipple discharge. Preop assessment prior to right breast [place]  wide lumpectomy w/Dr Rahman scheduled for 2022    Pt was educated on preop preparation with written and verbal instructions. Pt was informed to obtain clearance >3 days before surgery. Pt was educated on NSAIDs, multivitamins and herbals that increase the risk of bleeding and need to be stopped 7 days before procedure. Pt was educated on covid testing and covid prevention, i.e. social distancing, handwashing, mask wearing. Pt verbalized understanding of the above.     OPIOID RISK TOOL    SHARONDA EACH BOX THAT APPLIES AND ADD TOTALS AT THE END    FAMILY HISTORY OF SUBSTANCE ABUSE                 FEMALE         MALE                                                Alcohol                             [  ]1 pt          [  ]3pts                                               Illegal Durgs                     [  ]2 pts        [  ]3pts                                               Rx Drugs                           [  ]4 pts        [  ]4 pts    PERSONAL HISTORY OF SUBSTANCE ABUSE                                                                                          Alcohol                             [  ]3 pts       [  ]3 pts                                               Illegal Drugs                     [  ]4 pts        [  ]4 pts                                               Rx Drugs                           [  ]5 pts        [  ]5 pts    AGE BETWEEN 16-45 YEARS                                      [  ]1 pt         [  ]1 pt    HISTORY OF PREADOLESCENT   SEXUAL ABUSE                                                             [  ]3 pts        [  ]0pts    PSYCHOLOGICAL DISEASE                     ADD, OCD, Bipolar, Schizophrenia        [  ]2 pts         [  ]2 pts                      Depression                                               [  ]1 pt           [  ]1 pt           SCORING TOTAL   (add numbers and type here)              ( 0 )                                     A score of 3 or lower indicated LOW risk for future opioid abuse  A score of 4 to 7 indicated moderate risk for future opioid abuse  A score of 8 or higher indicates a high risk for opioid abuse    CAPRINI VTE 2.0 SCORE [CLOT updated 2019]    AGE RELATED RISK FACTORS                                                       MOBILITY RELATED FACTORS  [ ] Age 41-60 years                                            (1 Point)                    [ ] Bed rest                                                        (1 Point)  [ ] Age: 61-74 years                                           (2 Points)                  [ ] Plaster cast                                                   (2 Points)  [x ] Age= 75 years                                              (3 Points)                    [ ] Bed bound for more than 72 hours                 (2 Points)    DISEASE RELATED RISK FACTORS                                               GENDER SPECIFIC FACTORS  [ ] Edema in the lower extremities                       (1 Point)              [ ] Pregnancy                                                     (1 Point)  [ ] Varicose veins                                               (1 Point)                     [ ] Post-partum < 6 weeks                                   (1 Point)             [ ] BMI > 25 Kg/m2                                            (1 Point)                     [ ] Hormonal therapy  or oral contraception          (1 Point)                 [ ] Sepsis (in the previous month)                        (1 Point)               [ ] History of pregnancy complications                 (1 point)  [ ] Pneumonia or serious lung disease                                               [ ] Unexplained or recurrent                     (1 Point)           (in the previous month)                               (1 Point)  [ ] Abnormal pulmonary function test                     (1 Point)                 SURGERY RELATED RISK FACTORS  [ ] Acute myocardial infarction                              (1 Point)               [ ]  Section                                             (1 Point)  [ ] Congestive heart failure (in the previous month)  (1 Point)      [ ] Minor surgery                                                  (1 Point)   [ ] Inflammatory bowel disease                             (1 Point)               [ ] Arthroscopic surgery                                        (2 Points)  [ ] Central venous access                                      (2 Points)                [x ] General surgery lasting more than 45 minutes (2 points)  [x ] Malignancy- Present or previous                   (2 Points)                [ ] Elective arthroplasty                                         (5 points)    [ ] Stroke (in the previous month)                          (5 Points)                                                                                                                                                           HEMATOLOGY RELATED FACTORS                                                 TRAUMA RELATED RISK FACTORS  [ ] Prior episodes of VTE                                     (3 Points)                [ ] Fracture of the hip, pelvis, or leg                       (5 Points)  [ ] Positive family history for VTE                         (3 Points)             [ ] Acute spinal cord injury (in the previous month)  (5 Points)  [ ] Prothrombin 27225 A                                     (3 Points)               [ ] Paralysis  (less than 1 month)                             (5 Points)  [ ] Factor V Leiden                                             (3 Points)                  [ ] Multiple Trauma within 1 month                        (5 Points)  [ ] Lupus anticoagulants                                     (3 Points)                                                           [ ] Anticardiolipin antibodies                               (3 Points)                                                       [ ] High homocysteine in the blood                      (3 Points)                                             [ ] Other congenital or acquired thrombophilia      (3 Points)                                                [ ] Heparin induced thrombocytopenia                  (3 Points)                                     Total Score [     7     ]

## 2022-08-15 NOTE — H&P PST ADULT - ATTENDING COMMENTS
Patient with right breast DCIS now undergoing right breast [place]  localized wide lumpectomy. She understands risks v benefits and technical aspects of procedure as well as alternative. All questions were answered.

## 2022-08-15 NOTE — H&P PST ADULT - RESPIRATORY
normal/clear to auscultation bilaterally/no wheezes/no rales/no rhonchi normal/clear to auscultation bilaterally/no wheezes/no rales/no rhonchi/breath sounds equal/respirations non-labored

## 2022-08-15 NOTE — H&P PST ADULT - NSICDXFAMILYHX_GEN_ALL_CORE_FT
FAMILY HISTORY:  FH: breast cancer, 2 first cousins, paternal grandmother    Father  Still living? Unknown  FH: colon cancer, Age at diagnosis: Age Unknown    Aunt  Still living? Unknown  FH: colon cancer, Age at diagnosis: Age Unknown

## 2022-08-17 ENCOUNTER — OUTPATIENT (OUTPATIENT)
Dept: OUTPATIENT SERVICES | Facility: HOSPITAL | Age: 77
LOS: 1 days | Discharge: HOME | End: 2022-08-17

## 2022-08-17 DIAGNOSIS — C85.10 UNSPECIFIED B-CELL LYMPHOMA, UNSPECIFIED SITE: ICD-10-CM

## 2022-08-17 DIAGNOSIS — Z90.710 ACQUIRED ABSENCE OF BOTH CERVIX AND UTERUS: Chronic | ICD-10-CM

## 2022-08-18 PROBLEM — E78.5 HYPERLIPIDEMIA, UNSPECIFIED: Chronic | Status: ACTIVE | Noted: 2022-08-15

## 2022-08-18 PROBLEM — C85.90 NON-HODGKIN LYMPHOMA, UNSPECIFIED, UNSPECIFIED SITE: Chronic | Status: ACTIVE | Noted: 2022-08-15

## 2022-08-18 PROBLEM — K21.9 GASTRO-ESOPHAGEAL REFLUX DISEASE WITHOUT ESOPHAGITIS: Chronic | Status: ACTIVE | Noted: 2022-08-15

## 2022-08-18 PROBLEM — D05.11 INTRADUCTAL CARCINOMA IN SITU OF RIGHT BREAST: Chronic | Status: ACTIVE | Noted: 2022-08-15

## 2022-08-19 ENCOUNTER — NON-APPOINTMENT (OUTPATIENT)
Age: 77
End: 2022-08-19

## 2022-08-22 ENCOUNTER — APPOINTMENT (OUTPATIENT)
Dept: PULMONOLOGY | Facility: CLINIC | Age: 77
End: 2022-08-22

## 2022-08-22 ENCOUNTER — APPOINTMENT (OUTPATIENT)
Dept: HEMATOLOGY ONCOLOGY | Facility: CLINIC | Age: 77
End: 2022-08-22

## 2022-08-23 ENCOUNTER — APPOINTMENT (OUTPATIENT)
Dept: FAMILY MEDICINE | Facility: CLINIC | Age: 77
End: 2022-08-23

## 2022-08-23 VITALS
RESPIRATION RATE: 16 BRPM | SYSTOLIC BLOOD PRESSURE: 112 MMHG | HEIGHT: 61 IN | HEART RATE: 80 BPM | WEIGHT: 128 LBS | DIASTOLIC BLOOD PRESSURE: 75 MMHG | BODY MASS INDEX: 24.17 KG/M2

## 2022-08-23 PROCEDURE — 99214 OFFICE O/P EST MOD 30 MIN: CPT

## 2022-08-23 NOTE — PHYSICAL EXAM
[Normal] : affect was normal and insight and judgment were intact [No CVA Tenderness] : no CVA  tenderness

## 2022-08-23 NOTE — HISTORY OF PRESENT ILLNESS
[No Pertinent Cardiac History] : no history of aortic stenosis, atrial fibrillation, coronary artery disease, recent myocardial infarction, or implantable device/pacemaker [No Pertinent Pulmonary History] : no history of asthma, COPD, sleep apnea, or smoking [Family Member] : family member with adverse anesthesia reaction/sudden death [(Patient denies any chest pain, claudication, dyspnea on exertion, orthopnea, palpitations or syncope)] : Patient denies any chest pain, claudication, dyspnea on exertion, orthopnea, palpitations or syncope [Self] : no previous adverse anesthesia reaction [Chronic Anticoagulation] : no chronic anticoagulation [Chronic Kidney Disease] : no chronic kidney disease [Diabetes] : no diabetes [FreeTextEntry1] : right breast lumpectomy [FreeTextEntry2] : 08/26/2022 [FreeTextEntry3] : Viri Hawk [FreeTextEntry4] : Ms. YELITZA VALDEZ is a pleasant 76 year old female with PMH of low grade B cell lymphoma never on chemo/radiation/suegery, Right side ductal carcinoma in situ (DCIS) (2022) never on radiation/chemo/surgery HLD, GERD who normally follows up with Dr Boyd. Patient comes to the office for perioperative evaluation for right breast lumpectomy with breast surgery Dr Hawk for DCIS.\par Patient reports that is able to walk 4 blocks or a flight of stairs without getting chest pain, palpitations s or shortness of breath.  \par \par Heme/onc: Yanna\par [facility] surgeon: Carine [FreeTextEntry5] : brother gets a rash with anesthesia

## 2022-08-23 NOTE — RESULTS/DATA
[] : results reviewed [de-identified] : From PST in the [facility] on 08/15/2022 [de-identified] : From PST in the [facility] on 08/15/2022 [de-identified] : From PST in the [facility] on 08/15/2022 [de-identified] : From PST in the [facility] on 08/15/2022

## 2022-08-23 NOTE — PLAN
[FreeTextEntry1] : \par Ms. 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 Patient has greater than 4 METS, is a low-moderate perioperative risk for Major adverse cardiac event. ECG and blood work reviewed. No further testing indicated at this time. Patient is medically optimized for this procedure. \par \par During conversation with patient extensive medical records were reviewed including but not limited to hospital records, out patient records, laboratory data \par In addition extensive time was also spent in reviewing diagnostic studies.\par \par Avoid NSAIDs, vitamins and aspirin containing products prior to surgery\par \par Counseling included abnormal lab results, differential diagnoses, treatment options, risks and benefits, lifestyle changes, current condition, medications, and dose adjustments. \par The patient was interactive, attentive, asked questions, and verbalized understanding.\par \par Fax this note to PST at [facility]: [phone] or [phone] and [phone]

## 2022-08-24 ENCOUNTER — APPOINTMENT (OUTPATIENT)
Dept: MAMMOGRAPHY | Facility: CLINIC | Age: 77
End: 2022-08-24

## 2022-08-24 ENCOUNTER — OUTPATIENT (OUTPATIENT)
Dept: OUTPATIENT SERVICES | Facility: HOSPITAL | Age: 77
LOS: 1 days | End: 2022-08-24
Payer: MEDICARE

## 2022-08-24 DIAGNOSIS — Z90.710 ACQUIRED ABSENCE OF BOTH CERVIX AND UTERUS: Chronic | ICD-10-CM

## 2022-08-24 DIAGNOSIS — D05.11 INTRADUCTAL CARCINOMA IN SITU OF RIGHT BREAST: ICD-10-CM

## 2022-08-24 PROCEDURE — 19281 PERQ DEVICE BREAST 1ST IMAG: CPT | Mod: RT

## 2022-08-24 PROCEDURE — C1739: CPT

## 2022-08-24 PROCEDURE — 19281 PERQ DEVICE BREAST 1ST IMAG: CPT

## 2022-08-30 ENCOUNTER — TRANSCRIPTION ENCOUNTER (OUTPATIENT)
Age: 77
End: 2022-08-30

## 2022-08-30 NOTE — ASU PATIENT PROFILE, ADULT - NSICDXPASTMEDICALHX_GEN_ALL_CORE_FT
PAST MEDICAL HISTORY:  GERD (gastroesophageal reflux disease)     Hyperlipidemia     Intraductal carcinoma in situ of right breast     Lymphoma
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
## 2022-08-31 ENCOUNTER — RESULT REVIEW (OUTPATIENT)
Age: 77
End: 2022-08-31

## 2022-08-31 ENCOUNTER — APPOINTMENT (OUTPATIENT)
Dept: SURGERY | Facility: HOSPITAL | Age: 77
End: 2022-08-31

## 2022-08-31 ENCOUNTER — TRANSCRIPTION ENCOUNTER (OUTPATIENT)
Age: 77
End: 2022-08-31

## 2022-08-31 ENCOUNTER — OUTPATIENT (OUTPATIENT)
Dept: INPATIENT UNIT | Facility: HOSPITAL | Age: 77
LOS: 1 days | End: 2022-08-31
Payer: MEDICARE

## 2022-08-31 VITALS
TEMPERATURE: 98 F | DIASTOLIC BLOOD PRESSURE: 71 MMHG | HEART RATE: 77 BPM | RESPIRATION RATE: 14 BRPM | SYSTOLIC BLOOD PRESSURE: 115 MMHG | OXYGEN SATURATION: 100 %

## 2022-08-31 VITALS
HEART RATE: 76 BPM | WEIGHT: 128.09 LBS | RESPIRATION RATE: 16 BRPM | HEIGHT: 61 IN | OXYGEN SATURATION: 97 % | DIASTOLIC BLOOD PRESSURE: 75 MMHG | TEMPERATURE: 98 F | SYSTOLIC BLOOD PRESSURE: 130 MMHG

## 2022-08-31 DIAGNOSIS — D05.11 INTRADUCTAL CARCINOMA IN SITU OF RIGHT BREAST: ICD-10-CM

## 2022-08-31 DIAGNOSIS — Z01.810 ENCOUNTER FOR PREPROCEDURAL CARDIOVASCULAR EXAMINATION: ICD-10-CM

## 2022-08-31 DIAGNOSIS — Z90.710 ACQUIRED ABSENCE OF BOTH CERVIX AND UTERUS: Chronic | ICD-10-CM

## 2022-08-31 LAB
ANION GAP SERPL CALC-SCNC: 11 MMOL/L — SIGNIFICANT CHANGE UP (ref 5–17)
BUN SERPL-MCNC: 15.6 MG/DL — SIGNIFICANT CHANGE UP (ref 8–20)
CALCIUM SERPL-MCNC: 9.2 MG/DL — SIGNIFICANT CHANGE UP (ref 8.4–10.5)
CHLORIDE SERPL-SCNC: 106 MMOL/L — SIGNIFICANT CHANGE UP (ref 98–107)
CO2 SERPL-SCNC: 25 MMOL/L — SIGNIFICANT CHANGE UP (ref 22–29)
CREAT SERPL-MCNC: 0.48 MG/DL — LOW (ref 0.5–1.3)
EGFR: 98 ML/MIN/1.73M2 — SIGNIFICANT CHANGE UP
GLUCOSE SERPL-MCNC: 95 MG/DL — SIGNIFICANT CHANGE UP (ref 70–99)
POTASSIUM SERPL-MCNC: 3.8 MMOL/L — SIGNIFICANT CHANGE UP (ref 3.5–5.3)
POTASSIUM SERPL-SCNC: 3.8 MMOL/L — SIGNIFICANT CHANGE UP (ref 3.5–5.3)
SODIUM SERPL-SCNC: 142 MMOL/L — SIGNIFICANT CHANGE UP (ref 135–145)
TROPONIN T SERPL-MCNC: <0.01 NG/ML — SIGNIFICANT CHANGE UP (ref 0–0.06)

## 2022-08-31 PROCEDURE — 76098 X-RAY EXAM SURGICAL SPECIMEN: CPT | Mod: 26

## 2022-08-31 PROCEDURE — 14001 TIS TRNFR TRUNK 10.1-30SQCM: CPT

## 2022-08-31 PROCEDURE — 19301 PARTIAL MASTECTOMY: CPT | Mod: RT

## 2022-08-31 PROCEDURE — 88307 TISSUE EXAM BY PATHOLOGIST: CPT | Mod: 26

## 2022-08-31 PROCEDURE — 99219: CPT

## 2022-08-31 PROCEDURE — 36415 COLL VENOUS BLD VENIPUNCTURE: CPT

## 2022-08-31 PROCEDURE — 80048 BASIC METABOLIC PNL TOTAL CA: CPT

## 2022-08-31 PROCEDURE — 93010 ELECTROCARDIOGRAM REPORT: CPT

## 2022-08-31 PROCEDURE — 84484 ASSAY OF TROPONIN QUANT: CPT

## 2022-08-31 PROCEDURE — 88307 TISSUE EXAM BY PATHOLOGIST: CPT

## 2022-08-31 PROCEDURE — 76098 X-RAY EXAM SURGICAL SPECIMEN: CPT

## 2022-08-31 PROCEDURE — 93005 ELECTROCARDIOGRAM TRACING: CPT

## 2022-08-31 DEVICE — CLIP APPLIER COVIDIEN SURGICLIP III 9" SM: Type: IMPLANTABLE DEVICE | Status: FUNCTIONAL

## 2022-08-31 DEVICE — CLIP APPLIER COVIDIEN SURGICLIP 11.5" MEDIUM: Type: IMPLANTABLE DEVICE | Status: FUNCTIONAL

## 2022-08-31 RX ORDER — ECHINACEA PURPUREA EXTRACT 125 MG
0 TABLET ORAL
Qty: 0 | Refills: 0 | DISCHARGE

## 2022-08-31 RX ORDER — ONDANSETRON 8 MG/1
4 TABLET, FILM COATED ORAL ONCE
Refills: 0 | Status: DISCONTINUED | OUTPATIENT
Start: 2022-08-31 | End: 2022-08-31

## 2022-08-31 RX ORDER — FENTANYL CITRATE 50 UG/ML
50 INJECTION INTRAVENOUS
Refills: 0 | Status: DISCONTINUED | OUTPATIENT
Start: 2022-08-31 | End: 2022-08-31

## 2022-08-31 RX ORDER — ACETAMINOPHEN 500 MG
975 TABLET ORAL ONCE
Refills: 0 | Status: COMPLETED | OUTPATIENT
Start: 2022-08-31 | End: 2022-08-31

## 2022-08-31 RX ORDER — ASCORBIC ACID 60 MG
0 TABLET,CHEWABLE ORAL
Qty: 0 | Refills: 0 | DISCHARGE

## 2022-08-31 RX ORDER — SODIUM CHLORIDE 9 MG/ML
1000 INJECTION, SOLUTION INTRAVENOUS
Refills: 0 | Status: DISCONTINUED | OUTPATIENT
Start: 2022-08-31 | End: 2022-08-31

## 2022-08-31 RX ORDER — SODIUM CHLORIDE 9 MG/ML
3 INJECTION INTRAMUSCULAR; INTRAVENOUS; SUBCUTANEOUS ONCE
Refills: 0 | Status: DISCONTINUED | OUTPATIENT
Start: 2022-08-31 | End: 2022-08-31

## 2022-08-31 RX ADMIN — FENTANYL CITRATE 50 MICROGRAM(S): 50 INJECTION INTRAVENOUS at 14:15

## 2022-08-31 RX ADMIN — Medication 100 MILLIGRAM(S): at 12:00

## 2022-08-31 RX ADMIN — Medication 975 MILLIGRAM(S): at 06:37

## 2022-08-31 RX ADMIN — FENTANYL CITRATE 50 MICROGRAM(S): 50 INJECTION INTRAVENOUS at 13:59

## 2022-08-31 NOTE — BRIEF OPERATIVE NOTE - NSICDXBRIEFPREOP_GEN_ALL_CORE_FT
PRE-OP DIAGNOSIS:  Ductal carcinoma in situ (DCIS) of right breast 31-Aug-2022 13:25:20  Anne Carlton

## 2022-08-31 NOTE — CONSULT NOTE ADULT - ASSESSMENT
77 yo F PMH of HLD, GERD, low grade B cell lymphoma, and recently diagnosed right breast DCIS presents with c/o abnormal breast imaging demonstrating right breast calcifications.   Stereotactic core biopsy of right breast demonstrated ADH/DCIS.   Patient denies dominant breast mass, breast tenderness, skin changes or nipple discharge.   Preop assessment prior to right breast georgie  wide lumpectomy w/Dr Rahman scheduled for today 8/31/22    Cardiology consult called , for chest pain , pt currently in SDS awaiting procedure, endorsed that she had chest pain last night at about 1am , lasted till 4am and resolved on its own. Thought it was her GERD pain , took tums, and did not reslove.   Pain was different described as heaviness, did not radiate, no associated sob or diaphoresis , no sycope or dizziness  denies complaints of chest pain/sob/dizziness/palps in the past , has not seen a cardiologist for any reason . Pain is gone at this time

## 2022-08-31 NOTE — BRIEF OPERATIVE NOTE - OPERATION/FINDINGS
Ruthie  guided excision of mass. Clip removal confirmed radiographically. Margins sampled. Hemostasis achieved at completion of case.

## 2022-08-31 NOTE — CONSULT NOTE ADULT - NS ATTEND AMEND GEN_ALL_CORE FT
agree with above,    76F history of former smoker, anxiety, venous insuff., GERD, low grade B-cell lymphoma on observation, recently dx DCIS presents for elective lumpectomy, endorses midsternal nonexertional chest pain last night for 3 hours, had dinner late around 11pm, initial Trop and repeat EKG today normal.  -denies cardiac history or prior testing, baseline MET >4 and currently without chest pain, no evidence of ACS or unstable cardiac issue, suspect noncardiac chest pain related to GERD.  -Revised cardiac risk index of 0, given low risk surgery there is no cardiac contraindication and currently optimized to proceed with planned surgery, patient is in agreement to avoid delay in her surgery with breast cancer; no further cardiac testing indicated for now.  -advised can follow up with us in the outpatient cardiology office. Discussed with her son and Dr. Cid anesthesiologist as well at the bedside.         Pedro Luis Aguila DO, [facility]  Faculty Non-Invasive Cardiologist  [phone]

## 2022-08-31 NOTE — ASU DISCHARGE PLAN (ADULT/PEDIATRIC) - NS MD DC FALL RISK RISK
For information on Fall & Injury Prevention, visit: [email or web address] OR  [email or web address] OR  https://www.cdc.gov/steadi/patient.html

## 2022-08-31 NOTE — ASU DISCHARGE PLAN (ADULT/PEDIATRIC) - CARE PROVIDER_API CALL
Viri Rahman)  Surgery  [address], [facility] [place] [address]  Phone: [phone]  Fax: [phone]  Follow Up Time: 2 weeks

## 2022-08-31 NOTE — ASU DISCHARGE PLAN (ADULT/PEDIATRIC) - ASU DC SPECIAL INSTRUCTIONSFT
Wound care: Your wound has a gauze bandage with clear tape. You may remove this outer dressing tomorrow. Under this gauze dressing, there are thin, white strips called Steri Strips. These will fall off on their own in the next week or two. It is normal for there to be some bruising which can take up to 3-4 weeks to resolve.    Showering: You may shower starting tomorrow. Wash the incision with soapy water and rinse. Pat dry. Do not rub or scrub the incision.     Pain control: You should take Tylenol (daily maximum 4000mg) and/or Motrin every 6 hours for the next 24 hours and then as needed for pain. You can use a warm or cool compress for comfort as needed. There is a prescription for you for Percocet at your pharmacy in case your pain is uncontrolled with the Tylenol and Motrin. If you are needing to use it, avoid Tylenol when taking the Percocet

## 2022-08-31 NOTE — BRIEF OPERATIVE NOTE - NSICDXBRIEFPOSTOP_GEN_ALL_CORE_FT
POST-OP DIAGNOSIS:  Ductal carcinoma in situ (DCIS) of right breast 31-Aug-2022 13:25:25  Anne Carlton

## 2022-08-31 NOTE — ASU PREOP CHECKLIST - TYPE OF SOLUTION
Juan Patricia, OD  [facility] [place]  [address] [place] [address]. [place]  KAR[place] Tabares  [id number]    [phone]      
IVSL

## 2022-08-31 NOTE — PROGRESS NOTE ADULT - SUBJECTIVE AND OBJECTIVE BOX
patient is a 76 year old female scheduled for a lumpectomy. Pt has a pmHx significant for GERD, and hyperlipidemia.  patient described to me an episode of crushing chest pain last evening that lasted 3 hours, No radiation to the arm, diaphoresis, nausea. pt said she never had an occurance like this before and seemed unrelated to her history of reflux.   decided to order and EKG, lytes and a troponin and have cardiology service evaluate. all tests were normal,. chest pain resolved, pt with good exercise tolerance , and deemed optimized from a cardiac standpoint for surgery today      Dr. Dave Cid

## 2022-08-31 NOTE — CONSULT NOTE ADULT - PROBLEM SELECTOR RECOMMENDATION 9
called to eval c/o chest pain prior to R breast lumpectomy scheduled for today   -pain is resolved /  likely not anginal equivelent  -most likely related to GERD- pain fully resolved  -Troponin negative / hemodynamically stable   -EKG : 8/31/22 :   RCRI: low risk   No active chest pain, evidence of ischemia, decompensated CHF, significant valvular abnormality, or unstable arrhythmia > 4 Mets functional capacity,  and therefore no absolute cardiac contraindication to the planned surgical procedure.  no need for further cardiac testing  D/ w Dr.. Aguila and Dr. Cid called to eval c/o chest pain prior to R breast lumpectomy scheduled for today   -pain is resolved /  likely not anginal equivelent  -most likely related to GERD- pain fully resolved  -Troponin negative / hemodynamically stable   -EKG : 8/31/22 :  normal / unchanged , no ischemic changes   RCRI: low risk   No active chest pain, evidence of ischemia, decompensated CHF, significant valvular abnormality, or unstable arrhythmia > 4 Mets functional capacity,  and therefore no absolute cardiac contraindication to the planned surgical procedure.  no need for further cardiac testing  D/ w Dr.. Aguila and Dr. Cid

## 2022-08-31 NOTE — CONSULT NOTE ADULT - SUBJECTIVE AND OBJECTIVE BOX
[facility]                                              CARDIOLOGY AT [place] [address]                                             Telephone: [phone]. Fax:[phone]                                                       CARDIOLOGY CONSULTATION NOTE                                                                                             History obtained by: Patient and medical record  Community Cardiologist: NONE  Reason for Consultation: chest pain prior to scheduled surgery Right breast lumpectomy  Available out pt records reviewed: Yes [  ] No [ x ]    Chief complaint:    Patient is a 76y old  Female who presents with a chief complaint of chest pain     HPI:  77 yo F PMH of HLD, GERD, low grade B cell lymphoma, and recently diagnosed right breast DCIS presents with c/o abnormal breast imaging demonstrating right breast calcifications.   Stereotactic core biopsy of right breast demonstrated ADH/DCIS.   Patient denies dominant breast mass, breast tenderness, skin changes or nipple discharge.   Preop assessment prior to right breast georgie  wide lumpectomy w/Dr Rahman scheduled for today 8/31/22    Cardiology consult called , for chest pain , pt currently in [facility] awaiting procedure, endorsed that she had chest pain last night at about 1am , lasted till 4am and resolved on its own. Thought it was her GERD pain , took tums, and did not reslove.   Pain was different described as heaviness, did not radiate, no associated sob or diaphoresis , no sycope or dizziness  denies complaints of chest pain/sob/dizziness/palps in the past , has not seen a cardiologist for any reason . Pain is gone at this time         CARDIAC TESTING  NONE       PAST MEDICAL HISTORY  No pertinent past medical history    Intraductal carcinoma in situ of right breast    Hyperlipidemia    Lymphoma    GERD (gastroesophageal reflux disease)        PAST SURGICAL HISTORY  No significant past surgical history    H/O total hysterectomy        SOCIAL HISTORY:  Denies smoking/alcohol/drugs  CIGARETTES:     ALCOHOL:  DRUGS:    FAMILY HISTORY:  FH: breast cancer  2 first cousins, paternal grandmother    FH: colon cancer (Father, Aunt)      Family History of Cardiovascular Disease:  Yes [  ] No [ x ]  Coronary Artery Disease in first degree relative: Yes [  ] No [ x ]  Sudden Cardiac Death in First degree relative: Yes [  ] No [ x ]    HOME MEDICATIONS:  Echinacea oral tablet:  (31 Aug 2022 06:26)  Vitamin C: orally once a day (31 Aug 2022 06:26)      CURRENT CARDIAC MEDICATIONS:      CURRENT OTHER MEDICATIONS:  clindamycin IVPB 900 milliGRAM(s) IV Intermittent Once, Stop order after: 1 Doses  sodium chloride 0.9% lock flush 3 milliLiter(s) IV Push Once, Stop order after: 1 Doses      ALLERGIES:   erythromycin (Nausea; Vomiting)  penicillin (Swelling; Rash)      REVIEW OF SYMPTOMS:   CONSTITUTIONAL: No fever, no chills, no weight loss, no weight gain, no fatigue   ENMT:  No vertigo; No sinus or throat pain  NECK: No pain or stiffness  CARDIOVASCULAR: + chest pain, no dyspnea, no syncope/presyncope, no palpitations, no dizziness, no Orthopnea, no Paroxsymal nocturnal dyspnea  RESPIRATORY: no Shortness of breath, no cough, no wheezing  : No dysuria, no hematuria   GI: No dark color stool, no nausea, no diarrhea, no constipation, no abdominal pain + GERD  NEURO: No headache, no slurred speech   MUSCULOSKELETAL: No joint pain or swelling; No muscle, back, or extremity pain  PSYCH: No agitation, no anxiety.    ALL OTHER REVIEW OF SYSTEMS ARE NEGATIVE.    VITAL SIGNS:  T(C): 36.7 (08-31-22 @ 06:26), Max: 36.7 (08-31-22 @ 06:26)  T(F): 98 (08-31-22 @ 06:26), Max: 98 (08-31-22 @ 06:26)  HR: 76 (08-31-22 @ 06:26) (76 - 76)  BP: 130/75 (08-31-22 @ 06:26) (130/75 - 130/75)  RR: 16 (08-31-22 @ 06:26) (16 - 16)  SpO2: 97% (08-31-22 @ 06:26) (97% - 97%)    INTAKE AND OUTPUT:       PHYSICAL EXAM:  Constitutional: Comfortable . No acute distress.   HEENT: Atraumatic and normocephalic , neck is supple . no JVD. No carotid bruit.  CNS: A&Ox3. No focal deficits.   Respiratory: CTAB, unlabored   Cardiovascular: RRR normal s1 s2. 64  No murmur. No rubs or gallop.  Gastrointestinal: Soft, non-tender. +Bowel sounds.   Extremities: 2+ Peripheral Pulses, No clubbing, cyanosis, or edema  Psychiatric: Calm . no agitation.   Skin: Warm and dry, no ulcers on extremities     LABS:  ( 31 Aug 2022 07:39 )  Troponin T  <0.01,  CPK  X    , CKMB  X    , BNP X              08-31    142  |  106  |  15.6  ----------------------------<  95  3.8   |  25.0  |  0.48<L>    Ca    9.2      31 Aug 2022 07:39                    ECG:  pre op 8/15/22 RSR 80/ IVCD/ no acute STTW changes   8/31/22 :     RADIOLOGY & ADDITIONAL STUDIES:                                                 [facility]                                              CARDIOLOGY AT [place] [address]                                             Telephone: [phone]. Fax:[phone]                                                       CARDIOLOGY CONSULTATION NOTE                                                                                             History obtained by: Patient and medical record  Community Cardiologist: NONE  Reason for Consultation: chest pain prior to scheduled surgery Right breast lumpectomy  Available out pt records reviewed: Yes [  ] No [ x ]    Chief complaint:    Patient is a 76y old  Female who presents with a chief complaint of chest pain     HPI:  75 yo F PMH of HLD, GERD, low grade B cell lymphoma, and recently diagnosed right breast DCIS presents with c/o abnormal breast imaging demonstrating right breast calcifications.   Stereotactic core biopsy of right breast demonstrated ADH/DCIS.   Patient denies dominant breast mass, breast tenderness, skin changes or nipple discharge.   Preop assessment prior to right breast georgie  wide lumpectomy w/Dr Rahman scheduled for today 8/31/22    Cardiology consult called , for chest pain , pt currently in [facility] awaiting procedure, endorsed that she had chest pain last night at about 1am , lasted till 4am and resolved on its own. Thought it was her GERD pain , took tums, and did not reslove.   Pain was different described as heaviness, did not radiate, no associated sob or diaphoresis , no sycope or dizziness  denies complaints of chest pain/sob/dizziness/palps in the past , has not seen a cardiologist for any reason . Pain is gone at this time         CARDIAC TESTING  NONE       PAST MEDICAL HISTORY  No pertinent past medical history    Intraductal carcinoma in situ of right breast    Hyperlipidemia    Lymphoma    GERD (gastroesophageal reflux disease)        PAST SURGICAL HISTORY  No significant past surgical history    H/O total hysterectomy        SOCIAL HISTORY:  Denies smoking/alcohol/drugs  CIGARETTES:     ALCOHOL:  DRUGS:    FAMILY HISTORY:  FH: breast cancer  2 first cousins, paternal grandmother    FH: colon cancer (Father, Aunt)      Family History of Cardiovascular Disease:  Yes [  ] No [ x ]  Coronary Artery Disease in first degree relative: Yes [  ] No [ x ]  Sudden Cardiac Death in First degree relative: Yes [  ] No [ x ]    HOME MEDICATIONS:  Echinacea oral tablet:  (31 Aug 2022 06:26)  Vitamin C: orally once a day (31 Aug 2022 06:26)      CURRENT CARDIAC MEDICATIONS:      CURRENT OTHER MEDICATIONS:  clindamycin IVPB 900 milliGRAM(s) IV Intermittent Once, Stop order after: 1 Doses  sodium chloride 0.9% lock flush 3 milliLiter(s) IV Push Once, Stop order after: 1 Doses      ALLERGIES:   erythromycin (Nausea; Vomiting)  penicillin (Swelling; Rash)      REVIEW OF SYMPTOMS:   CONSTITUTIONAL: No fever, no chills, no weight loss, no weight gain, no fatigue   ENMT:  No vertigo; No sinus or throat pain  NECK: No pain or stiffness  CARDIOVASCULAR: + chest pain, no dyspnea, no syncope/presyncope, no palpitations, no dizziness, no Orthopnea, no Paroxsymal nocturnal dyspnea  RESPIRATORY: no Shortness of breath, no cough, no wheezing  : No dysuria, no hematuria   GI: No dark color stool, no nausea, no diarrhea, no constipation, no abdominal pain + GERD  NEURO: No headache, no slurred speech   MUSCULOSKELETAL: No joint pain or swelling; No muscle, back, or extremity pain  PSYCH: No agitation, no anxiety.    ALL OTHER REVIEW OF SYSTEMS ARE NEGATIVE.    VITAL SIGNS:  T(C): 36.7 (08-31-22 @ 06:26), Max: 36.7 (08-31-22 @ 06:26)  T(F): 98 (08-31-22 @ 06:26), Max: 98 (08-31-22 @ 06:26)  HR: 76 (08-31-22 @ 06:26) (76 - 76)  BP: 130/75 (08-31-22 @ 06:26) (130/75 - 130/75)  RR: 16 (08-31-22 @ 06:26) (16 - 16)  SpO2: 97% (08-31-22 @ 06:26) (97% - 97%)    INTAKE AND OUTPUT:       PHYSICAL EXAM:  Constitutional: Comfortable . No acute distress.   HEENT: Atraumatic and normocephalic , neck is supple . no JVD. No carotid bruit.  CNS: A&Ox3. No focal deficits.   Respiratory: CTAB, unlabored   Cardiovascular: RRR normal s1 s2. 64  No murmur. No rubs or gallop.  Gastrointestinal: Soft, non-tender. +Bowel sounds.   Extremities: 2+ Peripheral Pulses, No clubbing, cyanosis, or edema  Psychiatric: Calm . no agitation.   Skin: Warm and dry, no ulcers on extremities     LABS:  ( 31 Aug 2022 07:39 )  Troponin T  <0.01,  CPK  X    , CKMB  X    , BNP X              08-31    142  |  106  |  15.6  ----------------------------<  95  3.8   |  25.0  |  0.48<L>    Ca    9.2      31 Aug 2022 07:39                    ECG:  pre op 8/15/22 RSR 80/ IVCD/ no acute STTW changes   8/31/22 : RSR 67 / normal record , no STTW changes     RADIOLOGY & ADDITIONAL STUDIES:

## 2022-09-08 LAB — SURGICAL PATHOLOGY STUDY: SIGNIFICANT CHANGE UP

## 2022-09-20 ENCOUNTER — APPOINTMENT (OUTPATIENT)
Dept: SURGERY | Facility: CLINIC | Age: 77
End: 2022-09-20

## 2022-09-20 VITALS
TEMPERATURE: 97.7 F | BODY MASS INDEX: 23.61 KG/M2 | SYSTOLIC BLOOD PRESSURE: 131 MMHG | HEIGHT: 61 IN | WEIGHT: 125.04 LBS | DIASTOLIC BLOOD PRESSURE: 83 MMHG | OXYGEN SATURATION: 96 % | HEART RATE: 96 BPM

## 2022-09-20 PROCEDURE — 99024 POSTOP FOLLOW-UP VISIT: CPT

## 2022-09-20 NOTE — HISTORY OF PRESENT ILLNESS
[FreeTextEntry1] : re: post op wide lumpectomy of right breast DCIS 8/31/22 s/p wide lumpectomy with clear margins\par \par 77 yo postmenopausal female with hx of low grade B cell lymphoma and a medical hx of HLD, GERD presents with abnormal breast imaging demonstrating right breast calcifications. Stereotactic core biopsy of right breast demonstrated ADH/DCIS. Of note, right breast mammogram/ultrasound recommended stereotactic core biopsy (calcifications of the right breast) and ultrasound guided core biopsy of a nodule.  The nodule was benign but the calcifications were not biopsied until this recent biopsy. Underwent wide lumpectomy of right breast for DCIS 8/31/22.\par \par Patient denies fevers or chills. She is healing well. \par \par Of note, the patient states that after the ultrasound guided biopsy she did not return for the stereotactic core biopsy last year because of the fact that she was dealing with too many appointments and co-payments. I reassured her and explained that if she ever feels this way during the current diagnosis, work up and treatment, she should reach out to me.\par \par She is under the care of Dr. Raines for low grade lymphoma.\par \par Pathology\par 8/31/22 Final Diagnosis\par 1. Breast , right, georgie  localized wide lumpectomy\par - Ductal Carcinoma In Situ, cribriform and micropapillary growth pattern,intermediate nuclear grade, with microcalcifications\par - Lesion size = 1.3 cm\par - Margins of excision negative for DCIS ( closest margin 0.2 cm, anterior)\par \par 2. Breast, right, additional margin,  posterior deep  , excision\par - Fatty breast tissue with fibrocystic changes\par - Negative for atypia or malignancy\par \par 3. Breast, right, additional margin,  anterior  , excision\par - Fatty breast tissue\par - Negative for atypia or malignancy\par \par 4. Breast, right, additional margin,  lateral  , excision\par - Fatty breast tissue with focal Atypical Ductal Hyperplasia\par - Negative malignancy\par \par 5. Breast, right, additional margin,  medial  , excision\par - Fatty breast tissue\par - Negative for atypia or malignancy\par \par 6. Breast, right, additional margin,  superior  , excision\par - Fatty breast tissue\par - Negative for atypia or malignancy\par \par 7. Breast, right, additional margin,  inferior  , excision\par - Fatty breast tissue\par - Negative for atypia or malignancy\par \par Imaging: \par 7/6/22 breast MR bilat with CAD impression-inner right breast associated with 2.1cm non mass enhancement corresponding to the site of recent biopsy proven malignancy. No additional suspicious enhancing findings in the right breast or in the contralateral left breast.\par \par 6/7//2022 Bilateral diagnostic mammogram \par Impression: Stereotactic biopsy recommended of indeterminate increasing group of microcalcifications of the right breast. BIRADS 4B\par 6/7/2022 Bilateral mammogram/ultrasound\par Impression: Stereotactic core biopsy recommended of indeterminate calcifications in the inner right breast. BIRADS 4B\par 3/2020 Benign breast biopsy (ultrasound guided)\par 2/24/2020 Bilateral diagnostic mammogram/ultrasound BIRADS 4: Indeterminate calcifications of right breast- biopsy is recommended\par \par Pathology:\par 6/16/2022 Right breast, lower inner quadrant calcifications, needle biopsy: DCIS intermediate grade with micropapillary features.\par Atypical ductal hyperplasia, associated with calcifications. \par ER 90 SC 80\par \par We reviewed and discussed surgical pathology. Patient is healing well. All questions were answered. Follow up in 2-3 months for clinical breast exam.

## 2022-09-20 NOTE — ASSESSMENT
[FreeTextEntry1] : 76 yo postmenopausal female presents with microcalcifications of right  breast demonstrated on right breast imaging in 2020 for which biopsy was performed 6/2022. Right breast biopsy demonstrated atypical ductal hyperplasia as well as DCIS grade 2 ER 90% MN 80%. Underwent right breast wide lumpectomy on 8/31/22. Final pathology was reviewed.\par 1. Follow up med onc\par 2. Follow up rad onc\par 3. Follow up in 2-3 months for clinical breast exam \par

## 2022-09-20 NOTE — PHYSICAL EXAM
[Normocephalic] : normocephalic [Atraumatic] : atraumatic [EOMI] : extra ocular movement intact [PERRL] : pupils equal, round and reactive to light [Sclera nonicteric] : sclera nonicteric [Supple] : supple [No Supraclavicular Adenopathy] : no supraclavicular adenopathy [Examined in the supine and seated position] : examined in the supine and seated position [No dominant masses] : no dominant masses in right breast  [No dominant masses] : no dominant masses left breast [No Nipple Retraction] : no left nipple retraction [No Nipple Discharge] : no left nipple discharge [No Axillary Lymphadenopathy] : no left axillary lymphadenopathy [No Edema] : no edema [No Rashes] : no rashes [No Ulceration] : no ulceration [Breast Nipple Inversion] : nipples not inverted [Breast Nipple Retraction] : nipples not retracted [Breast Nipple Flattening] : nipples not flattened [Breast Nipple Fissures] : nipples not fissured [Breast Abnormal Lactation (Galactorrhea)] : no galactorrhea [Breast Abnormal Secretion Bloody Fluid] : no bloody discharge [Breast Abnormal Secretion Serous Fluid] : no serous discharge [Breast Abnormal Secretion Opalescent Fluid] : no milky discharge [de-identified] : tiana-areolar incision- well approximated, clean, dry intact, no erythema, mild swelling, no fluctuance, No supraclavicular or axillary adenopathy. No dominant breast mass, normal to palpation. Everted nipple without discharge. Ecchymoses throughout the medial right breast.  [de-identified] : No supraclavicular or axillary adenopathy. No dominant breast mass, normal to palpation. Everted nipple without discharge. No skin changes.

## 2022-09-28 ENCOUNTER — RESULT REVIEW (OUTPATIENT)
Age: 77
End: 2022-09-28

## 2022-09-28 ENCOUNTER — APPOINTMENT (OUTPATIENT)
Dept: HEMATOLOGY ONCOLOGY | Facility: CLINIC | Age: 77
End: 2022-09-28

## 2022-09-28 VITALS
BODY MASS INDEX: 24.19 KG/M2 | HEART RATE: 84 BPM | OXYGEN SATURATION: 96 % | DIASTOLIC BLOOD PRESSURE: 74 MMHG | WEIGHT: 128 LBS | SYSTOLIC BLOOD PRESSURE: 128 MMHG

## 2022-09-28 LAB
ALBUMIN SERPL ELPH-MCNC: 4.8 G/DL
ALP BLD-CCNC: 104 U/L
ALT SERPL-CCNC: 27 U/L
ANION GAP SERPL CALC-SCNC: 10 MMOL/L
AST SERPL-CCNC: 25 U/L
BASOPHILS # BLD AUTO: 0.1 K/UL — SIGNIFICANT CHANGE UP (ref 0–0.2)
BASOPHILS NFR BLD AUTO: 0.9 % — SIGNIFICANT CHANGE UP (ref 0–2)
BILIRUB SERPL-MCNC: 0.4 MG/DL
BUN SERPL-MCNC: 12 MG/DL
CALCIUM SERPL-MCNC: 9.6 MG/DL
CHLORIDE SERPL-SCNC: 106 MMOL/L
CO2 SERPL-SCNC: 27 MMOL/L
CREAT SERPL-MCNC: 0.57 MG/DL
EGFR: 94 ML/MIN/1.73M2
EOSINOPHIL # BLD AUTO: 0.1 K/UL — SIGNIFICANT CHANGE UP (ref 0–0.5)
EOSINOPHIL NFR BLD AUTO: 1.5 % — SIGNIFICANT CHANGE UP (ref 0–6)
GLUCOSE SERPL-MCNC: 97 MG/DL
HCT VFR BLD CALC: 42.4 % — SIGNIFICANT CHANGE UP (ref 34.5–45)
HGB BLD-MCNC: 13.6 G/DL — SIGNIFICANT CHANGE UP (ref 11.5–15.5)
LDH SERPL-CCNC: 164 U/L
LYMPHOCYTES # BLD AUTO: 2.7 K/UL — SIGNIFICANT CHANGE UP (ref 1–3.3)
LYMPHOCYTES # BLD AUTO: 47.4 % — HIGH (ref 13–44)
MCHC RBC-ENTMCNC: 32 G/DL — SIGNIFICANT CHANGE UP (ref 32–36)
MCHC RBC-ENTMCNC: 32 PG — SIGNIFICANT CHANGE UP (ref 27–34)
MCV RBC AUTO: 100 FL — SIGNIFICANT CHANGE UP (ref 80–100)
MONOCYTES # BLD AUTO: 0.2 K/UL — SIGNIFICANT CHANGE UP (ref 0–0.9)
MONOCYTES NFR BLD AUTO: 3.9 % — SIGNIFICANT CHANGE UP (ref 2–14)
NEUTROPHILS # BLD AUTO: 2.6 K/UL — SIGNIFICANT CHANGE UP (ref 1.8–7.4)
NEUTROPHILS NFR BLD AUTO: 46.3 % — SIGNIFICANT CHANGE UP (ref 43–77)
PLATELET # BLD AUTO: 245 K/UL — SIGNIFICANT CHANGE UP (ref 150–400)
POTASSIUM SERPL-SCNC: 4.9 MMOL/L
PROT SERPL-MCNC: 6.3 G/DL
RBC # BLD: 4.24 M/UL — SIGNIFICANT CHANGE UP (ref 3.8–5.2)
RBC # FLD: 13.2 % — SIGNIFICANT CHANGE UP (ref 10.3–14.5)
SODIUM SERPL-SCNC: 142 MMOL/L
WBC # BLD: 5.7 K/UL — SIGNIFICANT CHANGE UP (ref 3.8–10.5)
WBC # FLD AUTO: 5.7 K/UL — SIGNIFICANT CHANGE UP (ref 3.8–10.5)

## 2022-09-28 PROCEDURE — 99215 OFFICE O/P EST HI 40 MIN: CPT

## 2022-09-29 NOTE — ASSESSMENT
[FreeTextEntry1] : 77 year old female with PMHx of HLD, GERD and chronic diarrhea with no smoking history with CT Abd/Pel performed on 11/23/21 with adenopathy \par \par PET/CT performed on  12/3/21 demonstrating mildly FDG avid left greater than right axillary lymph nodes with reference 1.8 x 1.3 cm left axillary node (SUV 3.0).  \par Minimally FDG avid 2.2 x 1.3 cm ground glass nodule in the left upper lung (SUV 1.8), not significantly changed from CT on 8/5/2021.\par -  non-FDG avid retroperitoneal  1.5 x 1.2 cm  and mesenteric1 x 0.5 cm. lymph nodes. \par -Non-FDG avid right obturator node measures 1.7 x 0.7 cm  . \par -Nonspecific minimally FDG avid small and mildly enlarged bilateral inguinal with a reference 2 x 1.3 cm left inguinal node with SUV 3.9. Reference 1 cm left inguinal node with SUV 1.7\par \par s/p core biopsy of  left inguinal node performed on 2/1/22 with Dr. Brown. 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 \par \par #Left upper lobe nodule: \par -  2.2 x 1.3 cm ground glass nodule in the left upper lung (SUV 1.8) stable from aug 2021\par CT CHEST: Stable left upper lobe 2 cm groundglass nodule with 0.4 cm solid component. \par - Advised to f/u with dentist to evaluate for possible infection not seen\par -F/u with pulm for nodule\par \par \par #DCIS Rt breast \par - s/p lumpectomy\par Biopsy of Right inner quadrant of breast Path C/w DCIS intermediate nuclear grade, cribriform and micropapillary, with calcifications and without necrosis ER 90%, CT 80 % \par ADH associated with calcification\par S/p Lumpectomy on 8/31/22 Path c/w DCIS 1.3 cm, with 2mm margin \par - Given age > 70 likely will not require RT however f/u with dR zhang for final recommendations on RT \par - no ho Arthritis. She had a DEXA scan done a few years ago WIll repeat \par -S/p Hysterectomy in 1993\par - discussed anastrozole 1mg daily with ae profile \par F/u in 2 weeks with rosey to finalize plan after Dexa scan  If no plan for RT then will proceed with anastrozole \par

## 2022-09-29 NOTE — RESULTS/DATA
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\par -Immunohistochemical stains   for CD3, CD5, CD20, PAX5, CD10, BCL6, BCL2, CyclinD1, Ki67, CD23, CD21, CD43, CD79a, MUM1, YQE4cmpcad performed on\par  formalin fixed paraffin embedded tissue, block 1A.\par -Neoplastic cells are:  \par Positive:   CD5, CD20, PAX5, BCL-2, CD79a, CD43 (subset)\par Negative:   CD10, BCL6, CYCLIN D-1, CD23, MUM-1, LEF-1\par Other:   Ki67 proliferation index is 10% overall\par CD3 stain highlights small T cells in the background\par CD21 and CD23 stains  highlight small residual follicular dendritic cellmeshwork\par SOX11 stain is negative.\par \par -Flow cytometry analysis (60-IO-):    Flow cytometric analysis\par  attempted however, insufficient cells to report flow cytometry.  \par \par \par 12/3/21 PET/CT \par CHEST: Mildly FDG avid left greater than right axillary lymph nodes with reference 1.8 x 1.3 cm left axillary node (SUV 3.0). Mediastinum and hilar regions are unremarkable\par LUNGS: Minimally FDG avid 2.2 x 1.3 cm ground glass nodule in the left upper lung (SUV 1.8), not significantly changed from CT on 8/5/2021.\par ABDOMINOPELVIC NODES: No enlarged or FDG-avid lymph node in the abdomen or pelvis. A few non-FDG avid retroperitoneal and mesenteric lymph nodes. Reference non-FDG avid para-aortic lymph nodes measures 1.5 x 1.2 cm . Reference largest mesenteric node just posterior to the transverse colon measures 1 x 0.5 cm (image 187). Non-FDG avid right obturator node measures 1.7 x 0.7 cm (image 117). Nonspecific minimally FDG avid small and mildly enlarged bilateral inguinal with a reference 2 x 1.3\par cm left inguinal node with SUV 3.9. Reference 1 cm left inguinal node with SUV 1.7\par \par IMPRESSION:  FDG-PET/CT scan demonstrates:\par \par 1. Nonspecific mildly FDG avid bilateral axillary and bilateral mediastinal lymph nodes. The left\par inguinal node is amenable to ultrasound-guided FNA biopsy as indicated.\par \par 2. Nonspecific FDG avidity in the distal third of the esophagus with questionable wall thickening on\par CT. This may be further evaluated with endoscopy as warranted.\par \par 3. Nonspecific minimally FDG avid groundglass nodule in the left upper lung, unchanged on CT from\par 8/5/2021. This is nonspecific. Malignancy is with low-grade FDG activity assessed as\par adenocarcinoma in situ may present in a similar fashion.\par \par 4. Non-FDG avid retroperitoneal, pelvic and mesenteric lymph nodes.\par \par \par \par \par 11/23/21 CT Abd/Pel\par  - Retroperitoneal adenopathy, with representative para-aortic node measuring to 1.8 x 1.2 cm.\par - mesenteric adenopathy, with representative node measuring 1.6 x 1.4 cm \par - Left inguinal adenopathy measure up to 1.9 x 1.4 cm \par \par 8/5/2021 CT Chest no contrast\par - stable left apex 2.0 cm ground glass nodule with 0.4 cm solid component \par \par \par 6/29/21 U/S Venous Duplex\par -enlarged lymph nodes in the left groin 2.4 x 1.1 cm medially and 1.5 x 0.5 cm laterally. \par -there is no DVT or SVT bilaterally RLE - GSV not visualized. +torturous incompetent tributaries measuring 3.0-3.2 mm. LLE - LSV insufficiency and torturous incompetent tributaries measuring 2.7-4.5 mm. \par

## 2022-09-29 NOTE — HISTORY OF PRESENT ILLNESS
[de-identified] : 76 year old female former smoker 1/2 PPD for 10-15 years with PMHx of HLD, GERD and chronic diarrhea, hx of hysterectomy here for initial visit referred by Pulmonologist, Dr. Shields.  Patient presented to vascular specialist, Dr. Rubio, in June 2021 for routine follow up for venous insufficiency. Venous doppler performed on 6/29/21 demonstrated enlarged lymph nodes in the left groin 2.4 x 1.1 cm medially and 1.5 x 0.5 cm laterally. \par Subsequent CT Abd/Pel performed on 11/23/21 revealing retroperitoneal adenopathy, with representative para-aortic node measuring to 1.8 x 1.2 cm. Mesenteric adenopathy, with representative node measuring 1.6 x 1.4 cm and  left inguinal adenopathy measure up to 1.9 x 1.4 cm.\par PET/CT performed on  12/3/21 demonstrating mildly FDG avid left greater than right axillary lymph nodes with reference 1.8 x 1.3 cm left axillary node (SUV 3.0).  Minimally FDG avid 2.2 x 1.3 cm ground glass nodule in the left upper lung (SUV 1.8), not significantly changed from CT on 8/5/2021.No enlarged or FDG-avid lymph node in the abdomen or pelvis. A few non-FDG avid retroperitoneal and mesenteric lymph nodes. Reference non-FDG avid para-aortic lymph nodes measures 1.5 x 1.2 cm . Reference largest mesenteric node just posterior to the transverse colon measures 1 x 0.5 cm. Non-FDG avid right obturator node measures 1.7 x 0.7 cm  . Nonspecific minimally FDG avid small and mildly enlarged bilateral inguinal with a reference 2 x 1.3 cm left inguinal node with SUV 3.9. Reference 1 cm left inguinal node with SUV 1.7. CT CHEST on 7/1/20; 1.4 x 1.3 cm ground glass and solid density in the left uppper lobe apex with adjacent 1.0 x 0.4cm pleural-based thickening \par \par \par Patient evaluated by Dr. Camacho on 12/9/21 and it was decided to move forward with core biopsy. Biopsy of left inguinal node performed on 2/1/22 with Dr. Brown. 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  \par \par Social hx: Quit smoking at age 55 about 20 years ago \par  [de-identified] : Patient presents for a f/u \par \par S/p Lumpectomy on 8/31/22 Path c/w DCIS 1.3 cm, with 2mm margin , \par \par Patient with axillary node Biopsy on 7/21/22  +ve for - CD5 positive Low Grade B- Cell Lymphoma.\par No B symptoms. \par \par \par CT Chest on 6/7/22:  Stable left upper lobe 2 cm ground-glass nodule with 0.4 cm solid component. Follow-up is recommended.\par \par 8/31/22: Biopsy of Right inner quadrant of breast Path C/w DCIS intermediate nuclear grade, cribriform and micropapillary, with calcifications and without necrosis ER 90%, IA 80 % ADH associated with calcification

## 2022-09-29 NOTE — ADDENDUM
[FreeTextEntry1] : Documented by Erin Doan acting as scribe for Dr. Raines on 09/28/2022 \par \par All Medical record entries made by the Scribe were at my, Dr. Raines's, direction and personally dictated by me on 09/28/2022 . I have reviewed the chart and agree that the record accurately reflects my personal performance of the history, physical exam, assessment and plan. I have also personally directed, reviewed, and agreed with the discharge instructions.\par

## 2022-09-30 ENCOUNTER — APPOINTMENT (OUTPATIENT)
Dept: RADIOLOGY | Facility: CLINIC | Age: 77
End: 2022-09-30

## 2022-09-30 ENCOUNTER — OUTPATIENT (OUTPATIENT)
Dept: OUTPATIENT SERVICES | Facility: HOSPITAL | Age: 77
LOS: 1 days | End: 2022-09-30
Payer: MEDICARE

## 2022-09-30 DIAGNOSIS — C50.911 MALIGNANT NEOPLASM OF UNSPECIFIED SITE OF RIGHT FEMALE BREAST: ICD-10-CM

## 2022-09-30 DIAGNOSIS — Z90.710 ACQUIRED ABSENCE OF BOTH CERVIX AND UTERUS: Chronic | ICD-10-CM

## 2022-09-30 PROCEDURE — 77085 DXA BONE DENSITY AXL VRT FX: CPT | Mod: 26

## 2022-09-30 PROCEDURE — 77085 DXA BONE DENSITY AXL VRT FX: CPT

## 2022-10-05 ENCOUNTER — APPOINTMENT (OUTPATIENT)
Dept: RADIATION ONCOLOGY | Facility: CLINIC | Age: 77
End: 2022-10-05

## 2022-10-11 ENCOUNTER — APPOINTMENT (OUTPATIENT)
Dept: RADIATION ONCOLOGY | Facility: CLINIC | Age: 77
End: 2022-10-11

## 2022-10-11 VITALS
WEIGHT: 129 LBS | BODY MASS INDEX: 24.37 KG/M2 | RESPIRATION RATE: 16 BRPM | HEART RATE: 103 BPM | OXYGEN SATURATION: 97 % | SYSTOLIC BLOOD PRESSURE: 135 MMHG | DIASTOLIC BLOOD PRESSURE: 85 MMHG

## 2022-10-11 DIAGNOSIS — D05.11 INTRADUCTAL CARCINOMA IN SITU OF RIGHT BREAST: ICD-10-CM

## 2022-10-11 DIAGNOSIS — Z98.890 OTHER SPECIFIED POSTPROCEDURAL STATES: ICD-10-CM

## 2022-10-11 PROCEDURE — 99214 OFFICE O/P EST MOD 30 MIN: CPT

## 2022-10-12 PROBLEM — D05.11 DUCTAL CARCINOMA IN SITU (DCIS) OF RIGHT BREAST: Status: ACTIVE | Noted: 2022-06-28

## 2022-10-12 PROBLEM — Z98.890 S/P LUMPECTOMY, RIGHT BREAST: Status: ACTIVE | Noted: 2022-09-19

## 2022-10-12 NOTE — LETTER CLOSING
[Sincerely yours,] : Sincerely yours, [FreeTextEntry3] : Jeferson Kerr MD\par Physician in Chief\par Department of Radiation Medicine\par [facility] [place]\par [facility] [place]\par \par  of Radiation Medicine\par [facility]\par at  [facility]\par \par Radiation \par [facility]/\par [facility] [place]\par [address]\par [place], [place] [address]\par \par Tel: [phone]\par Fax: [phone]\par

## 2022-10-12 NOTE — HISTORY OF PRESENT ILLNESS
[FreeTextEntry1] : This 76 year-old female presents for radiation therapy follow-up.  Ms. Hendricks was diagnosed with Right breast atypical ductal hyperplasia as well as DCIS grade 2, ER 90%, WI 80% on 6/16/2022.  The patient is now s/p wide lumpectomy on 8/31/2022.\par \par Pathology\par 8/31/22 Final Diagnosis\par 1. Breast , right, georg[place]  localized wide lumpectomy\par - Ductal Carcinoma In Situ, cribriform and micropapillary growth pattern,intermediate nuclear grade, with microcalcifications\par - Lesion size = 1.3 cm\par - Margins of excision negative for DCIS ( closest margin 0.2 cm, anterior)\par \par 2. Breast, right, additional margin, posterior deep , excision\par - Fatty breast tissue with fibrocystic changes\par - Negative for atypia or malignancy\par \par 3. Breast, right, additional margin, anterior , excision\par - Fatty breast tissue\par - Negative for atypia or malignancy\par \par 4. Breast, right, additional margin, lateral , excision\par - Fatty breast tissue with focal Atypical Ductal Hyperplasia\par - Negative malignancy\par \par 5. Breast, right, additional margin, medial , excision\par - Fatty breast tissue\par - Negative for atypia or malignancy\par \par 6. Breast, right, additional margin, superior , excision\par - Fatty breast tissue\par - Negative for atypia or malignancy\par \par 7. Breast, right, additional margin, inferior , excision\par - Fatty breast tissue\par - Negative for atypia or malignancy\par \par Of note, Ms. Hendricks is currently under the care of Dr. Raines for low grade B-cell lymphoma.  Dr. Raines will likely prescribe anastrozole for Ms. Hendricks depending on bone density results and radiation decision.

## 2022-10-12 NOTE — PHYSICAL EXAM
[Normal] : oriented to person, place and time, the affect was normal, the mood was normal and not anxious [de-identified] : well healed periareolar surgical scar. [de-identified] : B/L lower extremity edema with stasis derm [de-identified] : B/L lower extremity stasis dermatitis

## 2022-10-12 NOTE — REVIEW OF SYSTEMS
[Lower Ext Edema] : lower extremity edema [Negative] : Allergic/Immunologic [FreeTextEntry5] : Follows with vascular -- Dr. Polo [de-identified] : stasis dermatitis BLE [de-identified] : low grade B-cell lymphoma, b/l lower extremity edema

## 2022-10-12 NOTE — PHYSICAL EXAM
[Normal] : oriented to person, place and time, the affect was normal, the mood was normal and not anxious [de-identified] : well healed periareolar surgical scar. [de-identified] : B/L lower extremity edema with stasis derm [de-identified] : B/L lower extremity stasis dermatitis

## 2022-10-12 NOTE — ASSESSMENT
[No evidence of disease] : No evidence of disease [FreeTextEntry1] : s/p lumpectomy for ER/NM+ DCIS of right breast.

## 2022-10-12 NOTE — HISTORY OF PRESENT ILLNESS
[FreeTextEntry1] : This 76 year-old female presents for radiation therapy follow-up.  Ms. Hendricks was diagnosed with Right breast atypical ductal hyperplasia as well as DCIS grade 2, ER 90%, WA 80% on 6/16/2022.  The patient is now s/p wide lumpectomy on 8/31/2022.\par \par Pathology\par 8/31/22 Final Diagnosis\par 1. Breast , right, georgie  localized wide lumpectomy\par - Ductal Carcinoma In Situ, cribriform and micropapillary growth pattern,intermediate nuclear grade, with microcalcifications\par - Lesion size = 1.3 cm\par - Margins of excision negative for DCIS ( closest margin 0.2 cm, anterior)\par \par 2. Breast, right, additional margin, posterior deep , excision\par - Fatty breast tissue with fibrocystic changes\par - Negative for atypia or malignancy\par \par 3. Breast, right, additional margin, anterior , excision\par - Fatty breast tissue\par - Negative for atypia or malignancy\par \par 4. Breast, right, additional margin, lateral , excision\par - Fatty breast tissue with focal Atypical Ductal Hyperplasia\par - Negative malignancy\par \par 5. Breast, right, additional margin, medial , excision\par - Fatty breast tissue\par - Negative for atypia or malignancy\par \par 6. Breast, right, additional margin, superior , excision\par - Fatty breast tissue\par - Negative for atypia or malignancy\par \par 7. Breast, right, additional margin, inferior , excision\par - Fatty breast tissue\par - Negative for atypia or malignancy\par \par Of note, Ms. Hendricks is currently under the care of Dr. Raines for low grade B-cell lymphoma.  Dr. Raines will likely prescribe anastrozole for Ms. Hendricks depending on bone density results and radiation decision.

## 2022-10-12 NOTE — REVIEW OF SYSTEMS
[Lower Ext Edema] : lower extremity edema [Negative] : Allergic/Immunologic [FreeTextEntry5] : Follows with vascular -- Dr. Polo [de-identified] : stasis dermatitis BLE [de-identified] : low grade B-cell lymphoma, b/l lower extremity edema

## 2022-10-12 NOTE — ASSESSMENT
[No evidence of disease] : No evidence of disease [FreeTextEntry1] : s/p lumpectomy for ER/AK+ DCIS of right breast.

## 2022-10-12 NOTE — LETTER CLOSING
[Sincerely yours,] : Sincerely yours, [FreeTextEntry3] : Jeferson Kerr MD\par Physician in Chief\par Department of Radiation Medicine\par [facility] [place]\par [facility] [place]\par \par  of Radiation Medicine\par [facility]\par at  [facility]\par \par Radiation \par [facility]par [facility] [place]\par [address]\par [place], [place] [address]\par \par Tel: [phone]\par Fax: [phone]\par

## 2022-10-13 ENCOUNTER — OUTPATIENT (OUTPATIENT)
Dept: OUTPATIENT SERVICES | Facility: HOSPITAL | Age: 77
LOS: 1 days | Discharge: HOME | End: 2022-10-13

## 2022-10-13 DIAGNOSIS — C85.10 UNSPECIFIED B-CELL LYMPHOMA, UNSPECIFIED SITE: ICD-10-CM

## 2022-10-13 DIAGNOSIS — Z90.710 ACQUIRED ABSENCE OF BOTH CERVIX AND UTERUS: Chronic | ICD-10-CM

## 2022-10-19 ENCOUNTER — APPOINTMENT (OUTPATIENT)
Dept: HEMATOLOGY ONCOLOGY | Facility: CLINIC | Age: 77
End: 2022-10-19

## 2022-10-19 ENCOUNTER — RESULT REVIEW (OUTPATIENT)
Age: 77
End: 2022-10-19

## 2022-10-19 VITALS
OXYGEN SATURATION: 97 % | DIASTOLIC BLOOD PRESSURE: 79 MMHG | BODY MASS INDEX: 24.55 KG/M2 | HEIGHT: 61 IN | WEIGHT: 130 LBS | HEART RATE: 97 BPM | SYSTOLIC BLOOD PRESSURE: 121 MMHG

## 2022-10-19 LAB
BASOPHILS # BLD AUTO: 0.1 K/UL — SIGNIFICANT CHANGE UP (ref 0–0.2)
BASOPHILS NFR BLD AUTO: 1 % — SIGNIFICANT CHANGE UP (ref 0–2)
EOSINOPHIL # BLD AUTO: 0.1 K/UL — SIGNIFICANT CHANGE UP (ref 0–0.5)
EOSINOPHIL NFR BLD AUTO: 1.3 % — SIGNIFICANT CHANGE UP (ref 0–6)
HCT VFR BLD CALC: 42.7 % — SIGNIFICANT CHANGE UP (ref 34.5–45)
HGB BLD-MCNC: 14 G/DL — SIGNIFICANT CHANGE UP (ref 11.5–15.5)
LYMPHOCYTES # BLD AUTO: 1 K/UL — SIGNIFICANT CHANGE UP (ref 1–3.3)
LYMPHOCYTES # BLD AUTO: 18.3 % — SIGNIFICANT CHANGE UP (ref 13–44)
MCHC RBC-ENTMCNC: 32.4 PG — SIGNIFICANT CHANGE UP (ref 27–34)
MCHC RBC-ENTMCNC: 32.7 G/DL — SIGNIFICANT CHANGE UP (ref 32–36)
MCV RBC AUTO: 98.9 FL — SIGNIFICANT CHANGE UP (ref 80–100)
MONOCYTES # BLD AUTO: 0.1 K/UL — SIGNIFICANT CHANGE UP (ref 0–0.9)
MONOCYTES NFR BLD AUTO: 2.3 % — SIGNIFICANT CHANGE UP (ref 2–14)
NEUTROPHILS # BLD AUTO: 4.1 K/UL — SIGNIFICANT CHANGE UP (ref 1.8–7.4)
NEUTROPHILS NFR BLD AUTO: 77.2 % — HIGH (ref 43–77)
PLATELET # BLD AUTO: 247 K/UL — SIGNIFICANT CHANGE UP (ref 150–400)
RBC # BLD: 4.32 M/UL — SIGNIFICANT CHANGE UP (ref 3.8–5.2)
RBC # FLD: 13.3 % — SIGNIFICANT CHANGE UP (ref 10.3–14.5)
WBC # BLD: 5.3 K/UL — SIGNIFICANT CHANGE UP (ref 3.8–10.5)
WBC # FLD AUTO: 5.3 K/UL — SIGNIFICANT CHANGE UP (ref 3.8–10.5)

## 2022-10-19 PROCEDURE — 99214 OFFICE O/P EST MOD 30 MIN: CPT

## 2022-10-20 LAB
ALBUMIN SERPL ELPH-MCNC: 4.6 G/DL
ALP BLD-CCNC: 100 U/L
ALT SERPL-CCNC: 18 U/L
ANION GAP SERPL CALC-SCNC: 9 MMOL/L
AST SERPL-CCNC: 21 U/L
BILIRUB SERPL-MCNC: 0.4 MG/DL
BUN SERPL-MCNC: 19 MG/DL
CALCIUM SERPL-MCNC: 9.6 MG/DL
CHLORIDE SERPL-SCNC: 103 MMOL/L
CO2 SERPL-SCNC: 27 MMOL/L
CREAT SERPL-MCNC: 0.62 MG/DL
EGFR: 92 ML/MIN/1.73M2
FERRITIN SERPL-MCNC: 61 NG/ML
FOLATE SERPL-MCNC: 13.9 NG/ML
GLUCOSE SERPL-MCNC: 112 MG/DL
IRON SATN MFR SERPL: 14 %
IRON SERPL-MCNC: 44 UG/DL
POTASSIUM SERPL-SCNC: 4.8 MMOL/L
PROT SERPL-MCNC: 6.3 G/DL
RBC # BLD: 4.27 M/UL
RETICS # AUTO: 2 %
RETICS AGGREG/RBC NFR: 85.4 K/UL
SODIUM SERPL-SCNC: 139 MMOL/L
TIBC SERPL-MCNC: 325 UG/DL
UIBC SERPL-MCNC: 281 UG/DL
VIT B12 SERPL-MCNC: 352 PG/ML

## 2022-10-21 NOTE — HISTORY OF PRESENT ILLNESS
[de-identified] : 76 year old female former smoker 1/2 PPD for 10-15 years with PMHx of HLD, GERD and chronic diarrhea, hx of hysterectomy here for initial visit referred by Pulmonologist, Dr. Shields.  Patient presented to vascular specialist, Dr. Rubio, in June 2021 for routine follow up for venous insufficiency. Venous doppler performed on 6/29/21 demonstrated enlarged lymph nodes in the left groin 2.4 x 1.1 cm medially and 1.5 x 0.5 cm laterally. \par Subsequent CT Abd/Pel performed on 11/23/21 revealing retroperitoneal adenopathy, with representative para-aortic node measuring to 1.8 x 1.2 cm. Mesenteric adenopathy, with representative node measuring 1.6 x 1.4 cm and  left inguinal adenopathy measure up to 1.9 x 1.4 cm.\par PET/CT performed on  12/3/21 demonstrating mildly FDG avid left greater than right axillary lymph nodes with reference 1.8 x 1.3 cm left axillary node (SUV 3.0).  Minimally FDG avid 2.2 x 1.3 cm ground glass nodule in the left upper lung (SUV 1.8), not significantly changed from CT on 8/5/2021.No enlarged or FDG-avid lymph node in the abdomen or pelvis. A few non-FDG avid retroperitoneal and mesenteric lymph nodes. Reference non-FDG avid para-aortic lymph nodes measures 1.5 x 1.2 cm . Reference largest mesenteric node just posterior to the transverse colon measures 1 x 0.5 cm. Non-FDG avid right obturator node measures 1.7 x 0.7 cm  . Nonspecific minimally FDG avid small and mildly enlarged bilateral inguinal with a reference 2 x 1.3 cm left inguinal node with SUV 3.9. Reference 1 cm left inguinal node with SUV 1.7. CT CHEST on 7/1/20; 1.4 x 1.3 cm ground glass and solid density in the left uppper lobe apex with adjacent 1.0 x 0.4cm pleural-based thickening \par \par \par Patient evaluated by Dr. Camacho on 12/9/21 and it was decided to move forward with core biopsy. Biopsy of left inguinal node performed on 2/1/22 with Dr. Brown. 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  \par \par Social hx: Quit smoking at age 55 about 20 years ago \par  [de-identified] : Patient presents for a f/u \par \par S/p Lumpectomy on 8/31/22 Path c/w DCIS 1.3 cm, with 2mm margin , \par \par Patient with axillary node Biopsy on 7/21/22  +ve for - CD5 positive Low Grade B- Cell Lymphoma.\par No B symptoms. \par \par Patient saw Dr. Kerr on 10/11 regarding RT, still has not made a decision \par Patient started Anastrazole a week ago, tolerating well\par Admits some fatigue and hot flashes \par \par 9/30/22 Bone Density: Osteoporosis\par \par CT Chest on 6/7/22:  Stable left upper lobe 2 cm ground-glass nodule with 0.4 cm solid component. Follow-up is recommended.\par \par 8/31/22: Biopsy of Right inner quadrant of breast Path C/w DCIS intermediate nuclear grade, cribriform and micropapillary, with calcifications and without necrosis ER 90%, DE 80 % ADH associated with calcification

## 2022-10-21 NOTE — ASSESSMENT
[FreeTextEntry1] : 77 year old female with PMHx of HLD, GERD and chronic diarrhea with no smoking history with CT Abd/Pel performed on 11/23/21 with adenopathy \par \par PET/CT performed on  12/3/21 demonstrating mildly FDG avid left greater than right axillary lymph nodes with reference 1.8 x 1.3 cm left axillary node (SUV 3.0).  \par Minimally FDG avid 2.2 x 1.3 cm ground glass nodule in the left upper lung (SUV 1.8), not significantly changed from CT on 8/5/2021.\par -  non-FDG avid retroperitoneal  1.5 x 1.2 cm  and mesenteric1 x 0.5 cm. lymph nodes. \par -Non-FDG avid right obturator node measures 1.7 x 0.7 cm  . \par -Nonspecific minimally FDG avid small and mildly enlarged bilateral inguinal with a reference 2 x 1.3 cm left inguinal node with SUV 3.9. Reference 1 cm left inguinal node with SUV 1.7\par \par s/p core biopsy of  left inguinal node performed on 2/1/22 with Dr. Brown. 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 \par \par #Left upper lobe nodule: \par -  2.2 x 1.3 cm ground glass nodule in the left upper lung (SUV 1.8) stable from aug 2021\par CT CHEST: Stable left upper lobe 2 cm groundglass nodule with 0.4 cm solid component. \par - Advised to f/u with dentist to evaluate for possible infection not seen\par -F/u with pulm for nodule\par \par \par #DCIS Rt breast \par - s/p lumpectomy\par Biopsy of Right inner quadrant of breast Path C/w DCIS intermediate nuclear grade, cribriform and micropapillary, with calcifications and without necrosis ER 90%, CA 80 % \par ADH associated with calcification\par S/p Lumpectomy on 8/31/22 Path c/w DCIS 1.3 cm, with 2mm margin \par - Given age > 70 likely will not require RT however f/u with dR kerr for final recommendations on RT \par - no ho Arthritis. She had a DEXA scan done a few years ago WIll repeat \par -S/p Hysterectomy in 1993\par - discussed anastrozole 1mg daily with ae profile \par -9/30/22 Bone Density: Osteoporosis, never has been treated for osteoporosis in the past. Refer to Endocrinology to discuss treatment \par - Patient saw Dr. Kerr on 10/11 regarding RT, still has not made a decision. Dr. Kerr waiting for DCISion- RT genetic testing. Patient plans to go to [place] 11/16. Will reach out to Dr. Kerr\par - Patient to continue Anastrazole, started 1 week ago\par - F/u in 4 weeks prior leaving for [place] \par

## 2022-10-21 NOTE — RESULTS/DATA
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\par -Immunohistochemical stains   for CD3, CD5, CD20, PAX5, CD10, BCL6, BCL2, CyclinD1, Ki67, CD23, CD21, CD43, CD79a, MUM1, [id number] performed on\par  formalin fixed paraffin embedded tissue, block 1A.\par -Neoplastic cells are:  \par Positive:   CD5, CD20, PAX5, BCL-2, CD79a, CD43 (subset)\par Negative:   CD10, BCL6, CYCLIN D-1, CD23, MUM-1, LEF-1\par Other:   Ki67 proliferation index is 10% overall\par CD3 stain highlights small T cells in the background\par CD21 and CD23 stains  highlight small residual follicular dendritic cellmeshwork\par SOX11 stain is negative.\par \par -Flow cytometry analysis (27-TN-):    Flow cytometric analysis\par  attempted however, insufficient cells to report flow cytometry.  \par \par \par 12/3/21 PET/CT \par CHEST: Mildly FDG avid left greater than right axillary lymph nodes with reference 1.8 x 1.3 cm left axillary node (SUV 3.0). Mediastinum and hilar regions are unremarkable\par LUNGS: Minimally FDG avid 2.2 x 1.3 cm ground glass nodule in the left upper lung (SUV 1.8), not significantly changed from CT on 8/5/2021.\par ABDOMINOPELVIC NODES: No enlarged or FDG-avid lymph node in the abdomen or pelvis. A few non-FDG avid retroperitoneal and mesenteric lymph nodes. Reference non-FDG avid para-aortic lymph nodes measures 1.5 x 1.2 cm . Reference largest mesenteric node just posterior to the transverse colon measures 1 x 0.5 cm (image 187). Non-FDG avid right obturator node measures 1.7 x 0.7 cm (image 117). Nonspecific minimally FDG avid small and mildly enlarged bilateral inguinal with a reference 2 x 1.3\par cm left inguinal node with SUV 3.9. Reference 1 cm left inguinal node with SUV 1.7\par \par IMPRESSION:  FDG-PET/CT scan demonstrates:\par \par 1. Nonspecific mildly FDG avid bilateral axillary and bilateral mediastinal lymph nodes. The left\par inguinal node is amenable to ultrasound-guided FNA biopsy as indicated.\par \par 2. Nonspecific FDG avidity in the distal third of the esophagus with questionable wall thickening on\par CT. This may be further evaluated with endoscopy as warranted.\par \par 3. Nonspecific minimally FDG avid groundglass nodule in the left upper lung, unchanged on CT from\par 8/5/2021. This is nonspecific. Malignancy is with low-grade FDG activity assessed as\par adenocarcinoma in situ may present in a similar fashion.\par \par 4. Non-FDG avid retroperitoneal, pelvic and mesenteric lymph nodes.\par \par \par \par \par 11/23/21 CT Abd/Pel\par  - Retroperitoneal adenopathy, with representative para-aortic node measuring to 1.8 x 1.2 cm.\par - mesenteric adenopathy, with representative node measuring 1.6 x 1.4 cm \par - Left inguinal adenopathy measure up to 1.9 x 1.4 cm \par \par 8/5/2021 CT Chest no contrast\par - stable left apex 2.0 cm ground glass nodule with 0.4 cm solid component \par \par \par 6/29/21 U/S Venous Duplex\par -enlarged lymph nodes in the left groin 2.4 x 1.1 cm medially and 1.5 x 0.5 cm laterally. \par -there is no DVT or SVT bilaterally RLE - GSV not visualized. +torturous incompetent tributaries measuring 3.0-3.2 mm. LLE - LSV insufficiency and torturous incompetent tributaries measuring 2.7-4.5 mm. \par

## 2022-11-08 LAB
ALBUMIN MFR SERPL ELPH: 69.8 %
ALBUMIN SERPL-MCNC: 4.4 G/DL
ALBUMIN/GLOB SERPL: 2.3 RATIO
ALPHA1 GLOB MFR SERPL ELPH: 4.5 %
ALPHA1 GLOB SERPL ELPH-MCNC: 0.3 G/DL
ALPHA2 GLOB MFR SERPL ELPH: 7.8 %
ALPHA2 GLOB SERPL ELPH-MCNC: 0.5 G/DL
B-GLOBULIN MFR SERPL ELPH: 9.7 %
B-GLOBULIN SERPL ELPH-MCNC: 0.6 G/DL
DEPRECATED KAPPA LC FREE/LAMBDA SER: 2.52 RATIO
GAMMA GLOB FLD ELPH-MCNC: 0.5 G/DL
GAMMA GLOB MFR SERPL ELPH: 8.2 %
IGA SER QL IEP: 39 MG/DL
IGG SER QL IEP: 538 MG/DL
IGM SER QL IEP: 16 MG/DL
INTERPRETATION SERPL IEP-IMP: NORMAL
KAPPA LC CSF-MCNC: 0.77 MG/DL
KAPPA LC SERPL-MCNC: 1.94 MG/DL
M PROTEIN MFR SERPL ELPH: 2.3 %
M PROTEIN SPEC IFE-MCNC: NORMAL
MONOCLON BAND OBS SERPL: 0.1 G/DL
PROT SERPL-MCNC: 6.3 G/DL
PROT SERPL-MCNC: 6.3 G/DL

## 2022-11-11 ENCOUNTER — APPOINTMENT (OUTPATIENT)
Dept: HEMATOLOGY ONCOLOGY | Facility: CLINIC | Age: 77
End: 2022-11-11

## 2022-11-11 VITALS
TEMPERATURE: 97.2 F | HEART RATE: 100 BPM | BODY MASS INDEX: 24.55 KG/M2 | DIASTOLIC BLOOD PRESSURE: 82 MMHG | SYSTOLIC BLOOD PRESSURE: 144 MMHG | OXYGEN SATURATION: 95 % | HEIGHT: 61 IN | WEIGHT: 130.04 LBS

## 2022-11-11 DIAGNOSIS — C85.90 NON-HODGKIN LYMPHOMA, UNSPECIFIED, UNSPECIFIED SITE: ICD-10-CM

## 2022-11-11 PROCEDURE — 99215 OFFICE O/P EST HI 40 MIN: CPT

## 2022-11-11 RX ORDER — LETROZOLE TABLETS 2.5 MG/1
2.5 TABLET, FILM COATED ORAL DAILY
Qty: 30 | Refills: 3 | Status: DISCONTINUED | COMMUNITY
Start: 2022-11-11 | End: 2022-11-11

## 2022-11-11 NOTE — ADDENDUM
[FreeTextEntry1] : Documented by Erin Doan acting as scribe for Dr. Raines on 11/11/2022 \par \par All Medical record entries made by the Scribe were at my, Dr. Raines's, direction and personally dictated by me on 11/11/2022 . I have reviewed the chart and agree that the record accurately reflects my personal performance of the history, physical exam, assessment and plan. I have also personally directed, reviewed, and agreed with the discharge instructions.\par

## 2022-11-11 NOTE — HISTORY OF PRESENT ILLNESS
[de-identified] : 77 year old female former smoker 1/2 PPD for 10-15 years with PMHx of HLD, GERD and chronic diarrhea, hx of hysterectomy here for initial visit referred by Pulmonologist, Dr. Shields.  Patient presented to vascular specialist, Dr. Rubio, in June 2021 for routine follow up for venous insufficiency. Venous doppler performed on 6/29/21 demonstrated enlarged lymph nodes in the left groin 2.4 x 1.1 cm medially and 1.5 x 0.5 cm laterally. \par Subsequent CT Abd/Pel performed on 11/23/21 revealing retroperitoneal adenopathy, with representative para-aortic node measuring to 1.8 x 1.2 cm. Mesenteric adenopathy, with representative node measuring 1.6 x 1.4 cm and  left inguinal adenopathy measure up to 1.9 x 1.4 cm.\par PET/CT performed on  12/3/21 demonstrating mildly FDG avid left greater than right axillary lymph nodes with reference 1.8 x 1.3 cm left axillary node (SUV 3.0).  Minimally FDG avid 2.2 x 1.3 cm ground glass nodule in the left upper lung (SUV 1.8), not significantly changed from CT on 8/5/2021.No enlarged or FDG-avid lymph node in the abdomen or pelvis. A few non-FDG avid retroperitoneal and mesenteric lymph nodes. Reference non-FDG avid para-aortic lymph nodes measures 1.5 x 1.2 cm . Reference largest mesenteric node just posterior to the transverse colon measures 1 x 0.5 cm. Non-FDG avid right obturator node measures 1.7 x 0.7 cm  . Nonspecific minimally FDG avid small and mildly enlarged bilateral inguinal with a reference 2 x 1.3 cm left inguinal node with SUV 3.9. Reference 1 cm left inguinal node with SUV 1.7. CT CHEST on 7/1/20; 1.4 x 1.3 cm ground glass and solid density in the left uppper lobe apex with adjacent 1.0 x 0.4cm pleural-based thickening \par \par \par Patient evaluated by Dr. Camacho on 12/9/21 and it was decided to move forward with core biopsy. Biopsy of left inguinal node performed on 2/1/22 with Dr. Brown. 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  \par \par Social hx: Quit smoking at age 55 about 20 years ago \par  [de-identified] : Patient presents for a f/u \par \par S/p Lumpectomy on 8/31/22 Path c/w DCIS 1.3 cm, with 2mm margin , \par \par Patient with axillary node Biopsy on 7/21/22  +ve for - CD5 positive Low Grade B- Cell Lymphoma.\par No B symptoms. \par \par Patient doing well\par Decided not to proceed with RT\par Patient took Anastrozole for 6 days and self d/c'd \par Reported hot flashes, musculoskeletal pain, insomnia 2/2 to Anastrozole\par Discussed letrozole / vs thompson ( given osteoporosis) \par 9/30/22 Bone Density: Osteoporosis\par \par CT Chest on 6/7/22:  Stable left upper lobe 2 cm ground-glass nodule with 0.4 cm solid component. Follow-up is recommended.\par \par 8/31/22: Biopsy of Right inner quadrant of breast Path C/w DCIS intermediate nuclear grade, cribriform and micropapillary, with calcifications and without necrosis ER 90%, ID 80 % ADH associated with calcification

## 2022-11-11 NOTE — ASSESSMENT
[FreeTextEntry1] : 77 year old female with PMHx of HLD, GERD and chronic diarrhea with no smoking history with CT Abd/Pel performed on 11/23/21 with adenopathy \par \par PET/CT performed on  12/3/21 demonstrating mildly FDG avid left greater than right axillary lymph nodes with reference 1.8 x 1.3 cm left axillary node (SUV 3.0).  \par Minimally FDG avid 2.2 x 1.3 cm ground glass nodule in the left upper lung (SUV 1.8), not significantly changed from CT on 8/5/2021.\par -  non-FDG avid retroperitoneal  1.5 x 1.2 cm  and mesenteric1 x 0.5 cm. lymph nodes. \par -Non-FDG avid right obturator node measures 1.7 x 0.7 cm  . \par -Nonspecific minimally FDG avid small and mildly enlarged bilateral inguinal with a reference 2 x 1.3 cm left inguinal node with SUV 3.9. Reference 1 cm left inguinal node with SUV 1.7\par \par s/p core biopsy of  left inguinal node performed on 2/1/22 with Dr. Brown. 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 \par \par #Left upper lobe nodule: \par -  2.2 x 1.3 cm ground glass nodule in the left upper lung (SUV 1.8) stable from aug 2021\par CT CHEST: Stable left upper lobe 2 cm groundglass nodule with 0.4 cm solid component. \par - discussed with Dr Arellano, \par -Advised to f/u with pulm for nodule\par \par \par #DCIS Rt breast \par - s/p lumpectomy\par Biopsy of Right inner quadrant of breast Path C/w DCIS intermediate nuclear grade, cribriform and micropapillary, with calcifications and without necrosis ER 90%, [place] 80 % \par ADH associated with calcification\par S/p Lumpectomy on 8/31/22 Path c/w DCIS 1.3 cm, with 2mm margin \par - no ho Arthritis. \par -9/30/22 Bone Density: Osteoporosis, never has been treated for osteoporosis in the past.\par -S/p Hysterectomy in 1993\par -Discussed switching patient to letrozole given she d/c'd letrozole 2/2 to side effects. Reviewed ae profile of letrozole including fatigue, change in mood, arthritis, arthralgias, osteoporosis, vaginal dryness, hot flashes. \par Also discussed Tamoxifen ( given Osteoporosis ) She is s/p Hysterectomy along with osteoporosis so might be a good choice for her, AE of PE / DVT \par -Patient planned not to move forward RT, there is no longer a role for RT given patient is >12 weeks post surgery. \par -Patient plans to go to [place] 11/16.\par -F/u with Endocrinology\par -F/u in 3 months

## 2022-11-11 NOTE — RESULTS/DATA
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\par -Immunohistochemical stains   for CD3, CD5, CD20, PAX5, CD10, BCL6, BCL2, CyclinD1, Ki67, CD23, CD21, CD43, CD79a, MUM1, [id number] performed on\par  formalin fixed paraffin embedded tissue, block 1A.\par -Neoplastic cells are:  \par Positive:   CD5, CD20, PAX5, BCL-2, CD79a, CD43 (subset)\par Negative:   CD10, BCL6, CYCLIN D-1, CD23, MUM-1, LEF-1\par Other:   Ki67 proliferation index is 10% overall\par CD3 stain highlights small T cells in the background\par CD21 and CD23 stains  highlight small residual follicular dendritic cellmeshwork\par SOX11 stain is negative.\par \par -Flow cytometry analysis (53-UP-):    Flow cytometric analysis\par  attempted however, insufficient cells to report flow cytometry.  \par \par \par 12/3/21 PET/CT \par CHEST: Mildly FDG avid left greater than right axillary lymph nodes with reference 1.8 x 1.3 cm left axillary node (SUV 3.0). Mediastinum and hilar regions are unremarkable\par LUNGS: Minimally FDG avid 2.2 x 1.3 cm ground glass nodule in the left upper lung (SUV 1.8), not significantly changed from CT on 8/5/2021.\par ABDOMINOPELVIC NODES: No enlarged or FDG-avid lymph node in the abdomen or pelvis. A few non-FDG avid retroperitoneal and mesenteric lymph nodes. Reference non-FDG avid para-aortic lymph nodes measures 1.5 x 1.2 cm . Reference largest mesenteric node just posterior to the transverse colon measures 1 x 0.5 cm (image 187). Non-FDG avid right obturator node measures 1.7 x 0.7 cm (image 117). Nonspecific minimally FDG avid small and mildly enlarged bilateral inguinal with a reference 2 x 1.3\par cm left inguinal node with SUV 3.9. Reference 1 cm left inguinal node with SUV 1.7\par \par IMPRESSION:  FDG-PET/CT scan demonstrates:\par \par 1. Nonspecific mildly FDG avid bilateral axillary and bilateral mediastinal lymph nodes. The left\par inguinal node is amenable to ultrasound-guided FNA biopsy as indicated.\par \par 2. Nonspecific FDG avidity in the distal third of the esophagus with questionable wall thickening on\par CT. This may be further evaluated with endoscopy as warranted.\par \par 3. Nonspecific minimally FDG avid groundglass nodule in the left upper lung, unchanged on CT from\par 8/5/2021. This is nonspecific. Malignancy is with low-grade FDG activity assessed as\par adenocarcinoma in situ may present in a similar fashion.\par \par 4. Non-FDG avid retroperitoneal, pelvic and mesenteric lymph nodes.\par \par \par \par \par 11/23/21 CT Abd/Pel\par  - Retroperitoneal adenopathy, with representative para-aortic node measuring to 1.8 x 1.2 cm.\par - mesenteric adenopathy, with representative node measuring 1.6 x 1.4 cm \par - Left inguinal adenopathy measure up to 1.9 x 1.4 cm \par \par 8/5/2021 CT Chest no contrast\par - stable left apex 2.0 cm ground glass nodule with 0.4 cm solid component \par \par \par 6/29/21 U/S Venous Duplex\par -enlarged lymph nodes in the left groin 2.4 x 1.1 cm medially and 1.5 x 0.5 cm laterally. \par -there is no DVT or SVT bilaterally RLE - GSV not visualized. +torturous incompetent tributaries measuring 3.0-3.2 mm. LLE - LSV insufficiency and torturous incompetent tributaries measuring 2.7-4.5 mm. \par

## 2022-12-19 ENCOUNTER — APPOINTMENT (OUTPATIENT)
Dept: INTERNAL MEDICINE | Facility: CLINIC | Age: 77
End: 2022-12-19

## 2022-12-19 VITALS
WEIGHT: 130 LBS | SYSTOLIC BLOOD PRESSURE: 118 MMHG | OXYGEN SATURATION: 98 % | BODY MASS INDEX: 24.55 KG/M2 | HEIGHT: 61 IN | HEART RATE: 92 BPM | DIASTOLIC BLOOD PRESSURE: 60 MMHG

## 2022-12-19 DIAGNOSIS — R73.03 PREDIABETES.: ICD-10-CM

## 2022-12-19 DIAGNOSIS — E78.5 HYPERLIPIDEMIA, UNSPECIFIED: ICD-10-CM

## 2022-12-19 DIAGNOSIS — E55.9 VITAMIN D DEFICIENCY, UNSPECIFIED: ICD-10-CM

## 2022-12-19 DIAGNOSIS — F41.9 ANXIETY DISORDER, UNSPECIFIED: ICD-10-CM

## 2022-12-19 PROCEDURE — 36415 COLL VENOUS BLD VENIPUNCTURE: CPT

## 2022-12-19 PROCEDURE — 99215 OFFICE O/P EST HI 40 MIN: CPT | Mod: 25

## 2022-12-19 NOTE — HISTORY OF PRESENT ILLNESS
[FreeTextEntry1] : FOLLOW UP  FATIGUE  [de-identified] : 76 YO FEMALE  WITH HX OF BRONCHIECTASIS HLD KYPHOSIS ADD  AND FEELS OK BUT WITH FATIGUE\par HAS SEEN DR RAMIREZ AND DR JESSICA SALTER FOR VASCUALRE \par PT S/P LUMPECTOMY BY DR TERAN  AS WELL AS FOLLOWED FOR LYMPHOMA AND BREAST CA BY MED ONCOLOGY DR ANTHONY BLANTON OVERALL FEELS OK \par

## 2022-12-19 NOTE — PLAN
[FreeTextEntry1] : 76 YO FEMALE  WITH HX OF BRONCHIECTASIS HLD KYPHOSIS ADD  AND FEELS OK BUT WITH FATIGUE\par HAS SEEN DR RAMIREZ AND DR JESSICA SALTER FOR VASCULAR \par PT S/P LUMPECTOMY BY DR TERAN  AS WELL AS FOLLOWED FOR LYMPHOMA AND BREAST CA BY MED ONCOLOGY DR ANTHONY BLANTON OVERALL FEELS OK \par    WILL CHECK LABS\par OVERALLL FEELING BETTER \par PT DID NOT RECIEVE RT POST LUMPECTOMY WAS ON LETROZOLE  BUT WAS UNABLE TO TOLERATE IT \par DOES NOT RECALL BEING OFFERED TAMOXIFEN\par WILL RECOMMEND SHE FOLLOWUP WITH ONCOLOGY\par TO D/W THEM About BREAST CA TH X AS WELL AS FOLLOWUP FOR LYMPHOMA

## 2022-12-19 NOTE — REVIEW OF SYSTEMS
[Fatigue] : fatigue [Cough] : cough [Diarrhea] : diarrhea [Heartburn] : heartburn [Mole Changes] : mole changes [Negative] : Heme/Lymph [de-identified] : LEFT TIBIAL AREA HYPERPIGMENTED ERYTHEMAOUT PURPLISH AREA NO WAMRHT

## 2022-12-19 NOTE — PHYSICAL EXAM
[Well Nourished] : well nourished [Well Developed] : well developed [Well-Appearing] : well-appearing [Normal Sclera/Conjunctiva] : normal sclera/conjunctiva [PERRL] : pupils equal round and reactive to light [EOMI] : extraocular movements intact [Normal Outer Ear/Nose] : the outer ears and nose were normal in appearance [Normal Oropharynx] : the oropharynx was normal [No JVD] : no jugular venous distention [No Lymphadenopathy] : no lymphadenopathy [Supple] : supple [Thyroid Normal, No Nodules] : the thyroid was normal and there were no nodules present [No Respiratory Distress] : no respiratory distress  [No Accessory Muscle Use] : no accessory muscle use [Clear to Auscultation] : lungs were clear to auscultation bilaterally [Normal Rate] : normal rate  [Regular Rhythm] : with a regular rhythm [Normal S1, S2] : normal S1 and S2 [No Murmur] : no murmur heard [No Carotid Bruits] : no carotid bruits [No Abdominal Bruit] : a ~M bruit was not heard ~T in the abdomen [No Varicosities] : no varicosities [Pedal Pulses Present] : the pedal pulses are present [No Edema] : there was no peripheral edema [No Palpable Aorta] : no palpable aorta [No Extremity Clubbing/Cyanosis] : no extremity clubbing/cyanosis [Soft] : abdomen soft [Non Tender] : non-tender [Non-distended] : non-distended [No Masses] : no abdominal mass palpated [No HSM] : no HSM [Normal Bowel Sounds] : normal bowel sounds [Normal Posterior Cervical Nodes] : no posterior cervical lymphadenopathy [Normal Anterior Cervical Nodes] : no anterior cervical lymphadenopathy [No CVA Tenderness] : no CVA  tenderness [No Spinal Tenderness] : no spinal tenderness [No Joint Swelling] : no joint swelling [Grossly Normal Strength/Tone] : grossly normal strength/tone [No Rash] : no rash [Coordination Grossly Intact] : coordination grossly intact [No Focal Deficits] : no focal deficits [Normal Gait] : normal gait [Deep Tendon Reflexes (DTR)] : deep tendon reflexes were 2+ and symmetric [Normal Affect] : the affect was normal [Normal Insight/Judgement] : insight and judgment were intact [de-identified] : RIGHT LOWER EXT TIBIAL AREA WITH ? STASIS DERMATIITS PURPLISH ERYTHEMAOUTS AREA NO BLISTER

## 2022-12-26 LAB
25(OH)D3 SERPL-MCNC: 43.1 NG/ML
CHOLEST SERPL-MCNC: 172 MG/DL
ESTIMATED AVERAGE GLUCOSE: 105 MG/DL
HBA1C MFR BLD HPLC: 5.3 %
HDLC SERPL-MCNC: 66 MG/DL
LDLC SERPL CALC-MCNC: 93 MG/DL
NONHDLC SERPL-MCNC: 106 MG/DL
TRIGL SERPL-MCNC: 67 MG/DL
TSH SERPL-ACNC: 1.84 UIU/ML

## 2023-02-07 ENCOUNTER — APPOINTMENT (OUTPATIENT)
Dept: HEMATOLOGY ONCOLOGY | Facility: CLINIC | Age: 78
End: 2023-02-07

## 2023-02-09 ENCOUNTER — APPOINTMENT (OUTPATIENT)
Dept: INTERNAL MEDICINE | Facility: CLINIC | Age: 78
End: 2023-02-09
Payer: MEDICARE

## 2023-02-09 ENCOUNTER — NON-APPOINTMENT (OUTPATIENT)
Age: 78
End: 2023-02-09

## 2023-02-09 VITALS
SYSTOLIC BLOOD PRESSURE: 132 MMHG | HEIGHT: 60 IN | OXYGEN SATURATION: 95 % | HEART RATE: 90 BPM | DIASTOLIC BLOOD PRESSURE: 80 MMHG | BODY MASS INDEX: 24.54 KG/M2 | WEIGHT: 125 LBS

## 2023-02-09 DIAGNOSIS — E87.5 HYPERKALEMIA: ICD-10-CM

## 2023-02-09 DIAGNOSIS — F98.8 OTHER SPECIFIED BEHAVIORAL AND EMOTIONAL DISORDERS WITH ONSET USUALLY OCCURRING IN CHILDHOOD AND ADOLESCENCE: ICD-10-CM

## 2023-02-09 DIAGNOSIS — M79.671 PAIN IN RIGHT FOOT: ICD-10-CM

## 2023-02-09 DIAGNOSIS — R07.89 OTHER CHEST PAIN: ICD-10-CM

## 2023-02-09 PROCEDURE — 93000 ELECTROCARDIOGRAM COMPLETE: CPT

## 2023-02-09 PROCEDURE — 99215 OFFICE O/P EST HI 40 MIN: CPT | Mod: 25

## 2023-02-09 RX ORDER — AZITHROMYCIN 250 MG/1
250 TABLET, FILM COATED ORAL
Qty: 1 | Refills: 0 | Status: DISCONTINUED | COMMUNITY
Start: 2022-12-19 | End: 2023-02-09

## 2023-02-09 RX ORDER — METHYLPREDNISOLONE 4 MG/1
4 TABLET ORAL
Qty: 1 | Refills: 0 | Status: DISCONTINUED | COMMUNITY
Start: 2019-01-30 | End: 2023-02-09

## 2023-02-09 RX ORDER — HYDROCODONE BITARTRATE AND HOMATROPINE METHYLBROMIDE 5; 1.5 MG/5ML; MG/5ML
5-1.5 SYRUP ORAL
Qty: 1 | Refills: 0 | Status: DISCONTINUED | COMMUNITY
Start: 2021-05-12 | End: 2023-02-09

## 2023-02-09 NOTE — PHYSICAL EXAM
[No Acute Distress] : no acute distress [Well Nourished] : well nourished [Well Developed] : well developed [Well-Appearing] : well-appearing [Normal Sclera/Conjunctiva] : normal sclera/conjunctiva [PERRL] : pupils equal round and reactive to light [EOMI] : extraocular movements intact [Normal Outer Ear/Nose] : the outer ears and nose were normal in appearance [Normal Oropharynx] : the oropharynx was normal [No JVD] : no jugular venous distention [No Lymphadenopathy] : no lymphadenopathy [Supple] : supple [Thyroid Normal, No Nodules] : the thyroid was normal and there were no nodules present [No Respiratory Distress] : no respiratory distress  [No Accessory Muscle Use] : no accessory muscle use [Clear to Auscultation] : lungs were clear to auscultation bilaterally [Normal Rate] : normal rate  [Regular Rhythm] : with a regular rhythm [Normal S1, S2] : normal S1 and S2 [No Murmur] : no murmur heard [No Carotid Bruits] : no carotid bruits [No Abdominal Bruit] : a ~M bruit was not heard ~T in the abdomen [No Varicosities] : no varicosities [Pedal Pulses Present] : the pedal pulses are present [No Edema] : there was no peripheral edema [No Palpable Aorta] : no palpable aorta [No Extremity Clubbing/Cyanosis] : no extremity clubbing/cyanosis [Soft] : abdomen soft [Non Tender] : non-tender [Non-distended] : non-distended [No Masses] : no abdominal mass palpated [No HSM] : no HSM [Normal Bowel Sounds] : normal bowel sounds [Normal Posterior Cervical Nodes] : no posterior cervical lymphadenopathy [Normal Anterior Cervical Nodes] : no anterior cervical lymphadenopathy [No CVA Tenderness] : no CVA  tenderness [No Spinal Tenderness] : no spinal tenderness [No Joint Swelling] : no joint swelling [Grossly Normal Strength/Tone] : grossly normal strength/tone [No Rash] : no rash [Coordination Grossly Intact] : coordination grossly intact [No Focal Deficits] : no focal deficits [Normal Gait] : normal gait [Deep Tendon Reflexes (DTR)] : deep tendon reflexes were 2+ and symmetric [Normal Affect] : the affect was normal [Normal Insight/Judgement] : insight and judgment were intact [de-identified] : RIGHT FOOT WITH LARGE CALLUS

## 2023-02-09 NOTE — HEALTH RISK ASSESSMENT
[0] : 2) Feeling down, depressed, or hopeless: Not at all (0) [PHQ-2 Negative - No further assessment needed] : PHQ-2 Negative - No further assessment needed [CHH7Stwfr] : 0

## 2023-02-09 NOTE — HISTORY OF PRESENT ILLNESS
[FreeTextEntry1] : FOLLOW UP  FATIGUE  [de-identified] : 76 YO FEMALE  WITH HX OF BRONCHIECTASIS HLD KYPHOSIS ADD  AND FEELS OK BUT WITH FATIGUE AND COUGH\par HAS SEEN DR RMAIREZ  IN THE PAST AND DR JESSICA SALTER FOR VASCULAR \par PT S/P LUMPECTOMY BY DR TERAN  AS WELL AS FOLLOWED FOR LYMPHOMA AND BREAST CA BY MED ONCOLOGY DR ANTHONY BLANTON OVERALL FEELS OK  BUT HAS COUGH INTERMITTENT \par

## 2023-02-09 NOTE — PLAN
[FreeTextEntry1] : 78 YO FEMALE  WITH HX OF BRONCHIECTASIS HLD KYPHOSIS ADD  AND FEELS OK BUT WITH FATIGUE AND COUGH\par HAS SEEN DR RAMIREZ  IN THE PAST AND DR JESSICA SALTER FOR VASCULAR \par PT S/P LUMPECTOMY BY DR TERAN  AS WELL AS FOLLOWED FOR LYMPHOMA AND BREAST CA BY MED ONCOLOGY DR ANTHONY BLANTON OVERALL FEELS OK  BUT HAS COUGH INTERMITTENT \par    PT DOES HAVE BRONCHIECTASIS AND COUGH MAY BE RELATED TO THIS SUGGEST FOLLOWUP WITH DR RAMIREZ\par WOULD   SUGGEST CARDIAC REFERRAL  R/O CARDIACATYPICAL CP\par EKG DONE TODAY REVIEWD WITH PATIENT NO ACUTE CHANGES  \par PODIATRY REFERRAL HAS CALLUS ON RIGHT FOOT  \par FOLLOW UP WITH  VASCULAR SHE WANTS ANOTHER OPINION WILL REFER TO DR LARSON

## 2023-05-09 ENCOUNTER — RX ONLY (RX ONLY)
Age: 78
End: 2023-05-09

## 2023-05-09 ENCOUNTER — OFFICE (OUTPATIENT)
Dept: URBAN - METROPOLITAN AREA CLINIC 104 | Facility: CLINIC | Age: 78
Setting detail: OPHTHALMOLOGY
End: 2023-05-09
Payer: MEDICARE

## 2023-05-09 DIAGNOSIS — H43.813: ICD-10-CM

## 2023-05-09 DIAGNOSIS — H25.12: ICD-10-CM

## 2023-05-09 DIAGNOSIS — H25.13: ICD-10-CM

## 2023-05-09 PROBLEM — H25.11 CATARACT SENILE NUCLEAR SCLEROSIS; RIGHT EYE, LEFT EYE, BOTH EYES: Status: ACTIVE | Noted: 2023-05-09

## 2023-05-09 PROCEDURE — 92136 OPHTHALMIC BIOMETRY: CPT | Performed by: SPECIALIST

## 2023-05-09 PROCEDURE — 92014 COMPRE OPH EXAM EST PT 1/>: CPT | Performed by: SPECIALIST

## 2023-05-09 ASSESSMENT — REFRACTION_MANIFEST
OU_VA: 20/40+2
OS_VA1: 20/40
OD_SPHERE: +2.25
OD_CYLINDER: SPH
OS_AXIS: 180
OD_VA1: 20/50
OD_AXIS: 180
OS_CYLINDER: SPH
OS_SPHERE: +1.75

## 2023-05-09 ASSESSMENT — REFRACTION_AUTOREFRACTION
OD_AXIS: 175
OS_SPHERE: +2.50
OS_CYLINDER: -0.75
OS_AXIS: 010
OD_SPHERE: +1.00
OD_CYLINDER: -1.25

## 2023-05-09 ASSESSMENT — CONFRONTATIONAL VISUAL FIELD TEST (CVF)
OD_FINDINGS: FULL
OS_FINDINGS: FULL

## 2023-05-09 ASSESSMENT — REFRACTION_CURRENTRX
OD_OVR_VA: 20/
OD_SPHERE: +4.25
OD_SPHERE: +1.50
OS_AXIS: 0
OS_SPHERE: +4.00
OS_AXIS: 0
OD_CYLINDER: SPH
OS_CYLINDER: -0.25
OD_AXIS: 0
OD_CYLINDER: SPH
OD_VPRISM_DIRECTION: SV
OD_VPRISM_DIRECTION: SV
OS_CYLINDER: SPH
OS_VPRISM_DIRECTION: SV
OD_OVR_VA: 20/
OS_OVR_VA: 20/
OD_AXIS: 0
OS_VPRISM_DIRECTION: SV
OS_OVR_VA: 20/
OS_SPHERE: +1.50

## 2023-05-09 ASSESSMENT — KERATOMETRY
OD_CYLPOWER_DEGREES: 0.55
OS_K1POWER_DIOPTERS: 43.66
OS_K1K2_AVERAGE: 44.27
OD_K2POWER_DIOPTERS: 45.49
OD_K2POWER_DIOPTERS: 45.49
OD_K1POWER_DIOPTERS: 44.94
OS_AXISANGLE2_DEGREES: 127
OS_K1POWER_DIOPTERS: 43.66
OD_K1POWER_DIOPTERS: 44.94
OD_AXISANGLE_DEGREES: 044
OD_AXISANGLE_DEGREES: 134
OS_K2POWER_DIOPTERS: 44.88
OD_K1K2_AVERAGE: 45.215
OS_CYLAXISANGLE_DEGREES: 127
OS_AXISANGLE_DEGREES: 127
OS_K2POWER_DIOPTERS: 44.88
OD_CYLAXISANGLE_DEGREES: 044
OS_AXISANGLE_DEGREES: 37
OS_CYLPOWER_DEGREES: 1.22
OD_AXISANGLE2_DEGREES: 044

## 2023-05-09 ASSESSMENT — AXIALLENGTH_DERIVED
OD_AL: 22.84
OS_AL: 22.53

## 2023-05-09 ASSESSMENT — VISUAL ACUITY
OD_BCVA: 20/40
OS_BCVA: 20/40

## 2023-05-09 ASSESSMENT — TONOMETRY
OS_IOP_MMHG: 18
OD_IOP_MMHG: 18

## 2023-05-09 ASSESSMENT — SPHEQUIV_DERIVED
OS_SPHEQUIV: 2.125
OD_SPHEQUIV: 0.375

## 2023-05-10 ENCOUNTER — RESULT REVIEW (OUTPATIENT)
Age: 78
End: 2023-05-10

## 2023-05-14 ENCOUNTER — NON-APPOINTMENT (OUTPATIENT)
Age: 78
End: 2023-05-14

## 2023-05-18 ENCOUNTER — APPOINTMENT (OUTPATIENT)
Dept: PULMONOLOGY | Facility: CLINIC | Age: 78
End: 2023-05-18
Payer: MEDICARE

## 2023-05-18 VITALS
WEIGHT: 121 LBS | OXYGEN SATURATION: 96 % | DIASTOLIC BLOOD PRESSURE: 82 MMHG | BODY MASS INDEX: 23.75 KG/M2 | HEIGHT: 60 IN | SYSTOLIC BLOOD PRESSURE: 126 MMHG | RESPIRATION RATE: 16 BRPM | HEART RATE: 78 BPM

## 2023-05-18 DIAGNOSIS — R05.3 CHRONIC COUGH: ICD-10-CM

## 2023-05-18 DIAGNOSIS — R91.8 OTHER NONSPECIFIC ABNORMAL FINDING OF LUNG FIELD: ICD-10-CM

## 2023-05-18 DIAGNOSIS — Z87.891 PERSONAL HISTORY OF NICOTINE DEPENDENCE: ICD-10-CM

## 2023-05-18 PROCEDURE — 99214 OFFICE O/P EST MOD 30 MIN: CPT

## 2023-05-18 RX ORDER — LEVOFLOXACIN 500 MG/1
500 TABLET, FILM COATED ORAL DAILY
Qty: 5 | Refills: 1 | Status: DISCONTINUED | COMMUNITY
Start: 2018-01-10 | End: 2023-05-18

## 2023-05-18 RX ORDER — AZELASTINE HYDROCHLORIDE 137 UG/1
0.1 SPRAY, METERED NASAL
Refills: 0 | Status: ACTIVE | COMMUNITY

## 2023-05-18 RX ORDER — ESOMEPRAZOLE MAGNESIUM 40 MG/1
40 CAPSULE, DELAYED RELEASE ORAL
Qty: 90 | Refills: 2 | Status: DISCONTINUED | COMMUNITY
Start: 2020-10-26 | End: 2023-05-18

## 2023-05-18 RX ORDER — ANASTROZOLE TABLETS 1 MG/1
1 TABLET ORAL DAILY
Qty: 90 | Refills: 3 | Status: DISCONTINUED | COMMUNITY
Start: 2022-09-28 | End: 2023-05-18

## 2023-05-18 NOTE — HISTORY OF PRESENT ILLNESS
[Former] : former [< 20 pack-years] : < 20 pack-years [TextBox_4] : nasal congestion with mucus\par cough keeping her up at night\par given azithromycin and Levaquin, and prednisone\par denies any benefit\par no fever, chill, chest pain\par no sig dyspnea\par cough is clearer, no longer discolored [YearQuit] : 2000

## 2023-05-18 NOTE — DISCUSSION/SUMMARY
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, stable x  yrs, she saw T Surgery Dr Boogie 2018 in past, T surgery follow up\par Currently with rhinosinusitis, given Zyrtec, Astelin\par Lung exam is normal, normal sp02\par PET scan reviewed, L axillary node bx c/w low grade B cell lymphoma- will refer to oncology \par last spirometry with normal flows, very mild obstruction, decreased from 2018\par Oncology  following, needs repeat CT chest, Immunoglobulin levels are now decreased\par will treat as exacerbation, medrol, doxycycline, Flonase, Claritin\par ENT eval if no improvement\par Follow up post CT scan, to call if no improvement\par Repeat spirometry at follow up

## 2023-05-18 NOTE — CONSULT LETTER
[Dear  ___] : Dear  [unfilled], [Consult Letter:] : I had the pleasure of evaluating your patient, [unfilled]. [Please see my note below.] : Please see my note below. [Consult Closing:] : Thank you very much for allowing me to participate in the care of this patient.  If you have any questions, please do not hesitate to contact me. [Sincerely,] : Sincerely, [DrGely  ___] : Dr. BRYAN [[facility]] : [facility], [facility] [Director, Respiratory Care] : Director, Respiratory Care [[facility]] : [facility] [FreeTextEntry3] : [facility]\par Pulmonary Critical Care\par Director Pulmonary Division\par Medical Director Respiratory Therapy\par [facility]\par \par

## 2023-05-21 ENCOUNTER — OUTPATIENT (OUTPATIENT)
Dept: OUTPATIENT SERVICES | Facility: HOSPITAL | Age: 78
LOS: 1 days | Discharge: HOME | End: 2023-05-21

## 2023-05-21 DIAGNOSIS — C85.10 UNSPECIFIED B-CELL LYMPHOMA, UNSPECIFIED SITE: ICD-10-CM

## 2023-05-21 DIAGNOSIS — Z90.710 ACQUIRED ABSENCE OF BOTH CERVIX AND UTERUS: Chronic | ICD-10-CM

## 2023-05-31 ENCOUNTER — APPOINTMENT (OUTPATIENT)
Dept: HEMATOLOGY ONCOLOGY | Facility: CLINIC | Age: 78
End: 2023-05-31
Payer: MEDICARE

## 2023-05-31 ENCOUNTER — RESULT REVIEW (OUTPATIENT)
Age: 78
End: 2023-05-31

## 2023-05-31 VITALS
BODY MASS INDEX: 23.95 KG/M2 | HEIGHT: 60 IN | OXYGEN SATURATION: 97 % | HEART RATE: 93 BPM | WEIGHT: 122 LBS | SYSTOLIC BLOOD PRESSURE: 133 MMHG | TEMPERATURE: 97.4 F | DIASTOLIC BLOOD PRESSURE: 74 MMHG

## 2023-05-31 LAB
BASOPHILS # BLD AUTO: 0.1 K/UL — SIGNIFICANT CHANGE UP (ref 0–0.2)
BASOPHILS NFR BLD AUTO: 1.1 % — SIGNIFICANT CHANGE UP (ref 0–2)
EOSINOPHIL # BLD AUTO: 0.1 K/UL — SIGNIFICANT CHANGE UP (ref 0–0.5)
EOSINOPHIL NFR BLD AUTO: 1.1 % — SIGNIFICANT CHANGE UP (ref 0–6)
HCT VFR BLD CALC: 39.7 % — SIGNIFICANT CHANGE UP (ref 34.5–45)
HGB BLD-MCNC: 13.3 G/DL — SIGNIFICANT CHANGE UP (ref 11.5–15.5)
LYMPHOCYTES # BLD AUTO: 2.4 K/UL — SIGNIFICANT CHANGE UP (ref 1–3.3)
LYMPHOCYTES # BLD AUTO: 32.6 % — SIGNIFICANT CHANGE UP (ref 13–44)
MCHC RBC-ENTMCNC: 31.6 PG — SIGNIFICANT CHANGE UP (ref 27–34)
MCHC RBC-ENTMCNC: 33.6 G/DL — SIGNIFICANT CHANGE UP (ref 32–36)
MCV RBC AUTO: 94.1 FL — SIGNIFICANT CHANGE UP (ref 80–100)
MONOCYTES # BLD AUTO: 0.2 K/UL — SIGNIFICANT CHANGE UP (ref 0–0.9)
MONOCYTES NFR BLD AUTO: 3 % — SIGNIFICANT CHANGE UP (ref 2–14)
NEUTROPHILS # BLD AUTO: 4.7 K/UL — SIGNIFICANT CHANGE UP (ref 1.8–7.4)
NEUTROPHILS NFR BLD AUTO: 62.3 % — SIGNIFICANT CHANGE UP (ref 43–77)
PLATELET # BLD AUTO: 301 K/UL — SIGNIFICANT CHANGE UP (ref 150–400)
RBC # BLD: 4.22 M/UL — SIGNIFICANT CHANGE UP (ref 3.8–5.2)
RBC # FLD: 13 % — SIGNIFICANT CHANGE UP (ref 10.3–14.5)
WBC # BLD: 7.5 K/UL — SIGNIFICANT CHANGE UP (ref 3.8–10.5)
WBC # FLD AUTO: 7.5 K/UL — SIGNIFICANT CHANGE UP (ref 3.8–10.5)

## 2023-05-31 PROCEDURE — 99215 OFFICE O/P EST HI 40 MIN: CPT

## 2023-05-31 NOTE — ASSESSMENT
[FreeTextEntry1] : 77 year old female with PMHx of HLD, GERD and chronic diarrhea with no smoking history with CT Abd/Pel performed on 11/23/21 with adenopathy \par \par PET/CT performed on  12/3/21 demonstrating mildly FDG avid left greater than right axillary lymph nodes with reference 1.8 x 1.3 cm left axillary node (SUV 3.0).  \par Minimally FDG avid 2.2 x 1.3 cm ground glass nodule in the left upper lung (SUV 1.8), not significantly changed from CT on 8/5/2021.\par -  non-FDG avid retroperitoneal  1.5 x 1.2 cm  and mesenteric1 x 0.5 cm. lymph nodes. \par -Non-FDG avid right obturator node measures 1.7 x 0.7 cm  . \par -Nonspecific minimally FDG avid small and mildly enlarged bilateral inguinal with a reference 2 x 1.3 cm left inguinal node with SUV 3.9. Reference 1 cm left inguinal node with SUV 1.7\par \par s/p core biopsy of  left inguinal node performed on 2/1/22 with Dr. Brown. 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 \par - Repeat CT chest on 6/6/23\par \par #Left upper lobe nodule: \par -  2.2 x 1.3 cm ground glass nodule in the left upper lung (SUV 1.8) stable from aug 2021\par CT CHEST: Stable left upper lobe 2 cm groundglass nodule with 0.4 cm solid component. \par - discussed with Dr Arellano, \par -Advised to f/u with pulm for nodule\par \par #DCIS Rt breast \par - s/p lumpectomy\par Biopsy of Right inner quadrant of breast Path C/w DCIS intermediate nuclear grade, cribriform and micropapillary, with calcifications and without necrosis ER 90%, RI 80 % \par ADH associated with calcification\par S/p Lumpectomy on 8/31/22 Path c/w DCIS 1.3 cm, with 2mm margin \par - no ho Arthritis. \par -9/30/22 Bone Density: Osteoporosis, never has been treated for osteoporosis in the past.\par -S/p Hysterectomy in 1993\par -Discussed switching patient to letrozole given she d/c'd letrozole 2/2 to side effects. Reviewed ae profile of letrozole including fatigue, change in mood, arthritis, arthralgias, osteoporosis, vaginal dryness, hot flashes. \par Also discussed Tamoxifen ( given Osteoporosis ) She is s/p Hysterectomy along with osteoporosis so might be a good choice for her, AE of PE / DVT \par -Patient planned not to move forward RT, there is no lonShe is not interested ain taking any chemo PPX ( AI/ or Loco,) \par -F/u with Endocrinology\par - mammo / sono scheduled for 6/6/23 \par -F/u in 4 weeks after Repeat Mammo/ sono/ CT [facility] \par

## 2023-05-31 NOTE — RESULTS/DATA
[FreeTextEntry1] : \par \par 9/30/22 Bone Density: Osteoporosis\par \par CT Chest on 6/7/22:  Stable left upper lobe 2 cm ground-glass nodule with 0.4 cm solid component. 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\par -Immunohistochemical stains   for CD3, CD5, CD20, PAX5, CD10, BCL6, BCL2, CyclinD1, Ki67, CD23, CD21, CD43, CD79a, MUM1, [id number] performed on\par  formalin fixed paraffin embedded tissue, block 1A.\par -Neoplastic cells are:  \par Positive:   CD5, CD20, PAX5, BCL-2, CD79a, CD43 (subset)\par Negative:   CD10, BCL6, CYCLIN D-1, CD23, MUM-1, LEF-1\par Other:   Ki67 proliferation index is 10% overall\par CD3 stain highlights small T cells in the background\par CD21 and CD23 stains  highlight small residual follicular dendritic cellmeshwork\par SOX11 stain is negative.\par \par -Flow cytometry analysis (93-PL-):    Flow cytometric analysis\par  attempted however, insufficient cells to report flow cytometry.  \par \par \par 12/3/21 PET/CT \par CHEST: Mildly FDG avid left greater than right axillary lymph nodes with reference 1.8 x 1.3 cm left axillary node (SUV 3.0). Mediastinum and hilar regions are unremarkable\par LUNGS: Minimally FDG avid 2.2 x 1.3 cm ground glass nodule in the left upper lung (SUV 1.8), not significantly changed from CT on 8/5/2021.\par ABDOMINOPELVIC NODES: No enlarged or FDG-avid lymph node in the abdomen or pelvis. A few non-FDG avid retroperitoneal and mesenteric lymph nodes. Reference non-FDG avid para-aortic lymph nodes measures 1.5 x 1.2 cm . Reference largest mesenteric node just posterior to the transverse colon measures 1 x 0.5 cm (image 187). Non-FDG avid right obturator node measures 1.7 x 0.7 cm (image 117). Nonspecific minimally FDG avid small and mildly enlarged bilateral inguinal with a reference 2 x 1.3\par cm left inguinal node with SUV 3.9. Reference 1 cm left inguinal node with SUV 1.7\par \par IMPRESSION:  FDG-PET/CT scan demonstrates:\par \par 1. Nonspecific mildly FDG avid bilateral axillary and bilateral mediastinal lymph nodes. The left\par inguinal node is amenable to ultrasound-guided FNA biopsy as indicated.\par \par 2. Nonspecific FDG avidity in the distal third of the esophagus with questionable wall thickening on\par CT. This may be further evaluated with endoscopy as warranted.\par \par 3. Nonspecific minimally FDG avid groundglass nodule in the left upper lung, unchanged on CT from\par 8/5/2021. This is nonspecific. Malignancy is with low-grade FDG activity assessed as\par adenocarcinoma in situ may present in a similar fashion.\par \par 4. Non-FDG avid retroperitoneal, pelvic and mesenteric lymph nodes.\par \par \par \par \par 11/23/21 CT Abd/Pel\par  - Retroperitoneal adenopathy, with representative para-aortic node measuring to 1.8 x 1.2 cm.\par - mesenteric adenopathy, with representative node measuring 1.6 x 1.4 cm \par - Left inguinal adenopathy measure up to 1.9 x 1.4 cm \par \par 8/5/2021 CT Chest no contrast\par - stable left apex 2.0 cm ground glass nodule with 0.4 cm solid component \par \par \par 6/29/21 U/S Venous Duplex\par -enlarged lymph nodes in the left groin 2.4 x 1.1 cm medially and 1.5 x 0.5 cm laterally. \par -there is no DVT or SVT bilaterally RLE - GSV not visualized. +torturous incompetent tributaries measuring 3.0-3.2 mm. LLE - LSV insufficiency and torturous incompetent tributaries measuring 2.7-4.5 mm. \par

## 2023-05-31 NOTE — HISTORY OF PRESENT ILLNESS
[de-identified] : 77 year old female former smoker 1/2 PPD for 10-15 years with PMHx of HLD, GERD and chronic diarrhea, hx of hysterectomy here for initial visit referred by Pulmonologist, Dr. Shields.  Patient presented to vascular specialist, Dr. Rubio, in June 2021 for routine follow up for venous insufficiency. Venous doppler performed on 6/29/21 demonstrated enlarged lymph nodes in the left groin 2.4 x 1.1 cm medially and 1.5 x 0.5 cm laterally. \par Subsequent CT Abd/Pel performed on 11/23/21 revealing retroperitoneal adenopathy, with representative para-aortic node measuring to 1.8 x 1.2 cm. Mesenteric adenopathy, with representative node measuring 1.6 x 1.4 cm and  left inguinal adenopathy measure up to 1.9 x 1.4 cm.\par PET/CT performed on  12/3/21 demonstrating mildly FDG avid left greater than right axillary lymph nodes with reference 1.8 x 1.3 cm left axillary node (SUV 3.0).  Minimally FDG avid 2.2 x 1.3 cm ground glass nodule in the left upper lung (SUV 1.8), not significantly changed from CT on 8/5/2021.No enlarged or FDG-avid lymph node in the abdomen or pelvis. A few non-FDG avid retroperitoneal and mesenteric lymph nodes. Reference non-FDG avid para-aortic lymph nodes measures 1.5 x 1.2 cm . Reference largest mesenteric node just posterior to the transverse colon measures 1 x 0.5 cm. Non-FDG avid right obturator node measures 1.7 x 0.7 cm  . Nonspecific minimally FDG avid small and mildly enlarged bilateral inguinal with a reference 2 x 1.3 cm left inguinal node with SUV 3.9. Reference 1 cm left inguinal node with SUV 1.7. CT CHEST on 7/1/20; 1.4 x 1.3 cm ground glass and solid density in the left uppper lobe apex with adjacent 1.0 x 0.4cm pleural-based thickening \par \par \par Patient evaluated by Dr. Camacho on 12/9/21 and it was decided to move forward with core biopsy. Biopsy of left inguinal node performed on 2/1/22 with Dr. Brown. 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  \par \par Social hx: Quit smoking at age 55 about 20 years ago \par  [de-identified] : Patient presents for a f/u \par \par S/p Lumpectomy on 8/31/22 Path c/w DCIS 1.3 cm, with 2mm margin , \par \par Patient with axillary node Biopsy on 7/21/22  +ve for - CD5 positive Low Grade B- Cell Lymphoma.\par No B symptoms. \par \par Patient doing well\par Decided not to proceed with RT\par Patient took Anastrozole for 6 days and self d/c'd  Reported hot flashes, musculoskeletal pain, insomnia 2/2 to Anastrozole\par Discussed letrozole / vs thompson ( given osteoporosis) \par She is not interested in taking any Prophylaxis  Reports that her son is not in favor of her taking any medication I advised that she consider taking \par SHe has a mammo/ sono on 6/6/23 \par CT Chest on 6/6/23\par

## 2023-06-06 ENCOUNTER — OUTPATIENT (OUTPATIENT)
Dept: OUTPATIENT SERVICES | Facility: HOSPITAL | Age: 78
LOS: 1 days | End: 2023-06-06
Payer: MEDICARE

## 2023-06-06 ENCOUNTER — APPOINTMENT (OUTPATIENT)
Dept: MAMMOGRAPHY | Facility: CLINIC | Age: 78
End: 2023-06-06
Payer: MEDICARE

## 2023-06-06 ENCOUNTER — RESULT REVIEW (OUTPATIENT)
Age: 78
End: 2023-06-06

## 2023-06-06 ENCOUNTER — APPOINTMENT (OUTPATIENT)
Dept: CT IMAGING | Facility: CLINIC | Age: 78
End: 2023-06-06
Payer: MEDICARE

## 2023-06-06 ENCOUNTER — APPOINTMENT (OUTPATIENT)
Dept: ULTRASOUND IMAGING | Facility: CLINIC | Age: 78
End: 2023-06-06
Payer: MEDICARE

## 2023-06-06 DIAGNOSIS — Z90.710 ACQUIRED ABSENCE OF BOTH CERVIX AND UTERUS: Chronic | ICD-10-CM

## 2023-06-06 DIAGNOSIS — R91.8 OTHER NONSPECIFIC ABNORMAL FINDING OF LUNG FIELD: ICD-10-CM

## 2023-06-06 DIAGNOSIS — D05.11 INTRADUCTAL CARCINOMA IN SITU OF RIGHT BREAST: ICD-10-CM

## 2023-06-06 DIAGNOSIS — C50.911 MALIGNANT NEOPLASM OF UNSPECIFIED SITE OF RIGHT FEMALE BREAST: ICD-10-CM

## 2023-06-06 LAB
ALBUMIN SERPL ELPH-MCNC: 4.3 G/DL
ALP BLD-CCNC: 77 U/L
ALT SERPL-CCNC: 16 U/L
ANION GAP SERPL CALC-SCNC: 9 MMOL/L
AST SERPL-CCNC: 18 U/L
BILIRUB SERPL-MCNC: 0.4 MG/DL
BUN SERPL-MCNC: 14 MG/DL
CALCIUM SERPL-MCNC: 9.5 MG/DL
CHLORIDE SERPL-SCNC: 104 MMOL/L
CO2 SERPL-SCNC: 28 MMOL/L
CREAT SERPL-MCNC: 0.59 MG/DL
EGFR: 93 ML/MIN/1.73M2
GLUCOSE SERPL-MCNC: 78 MG/DL
POTASSIUM SERPL-SCNC: 5.5 MMOL/L
PROT SERPL-MCNC: 5.6 G/DL
SODIUM SERPL-SCNC: 141 MMOL/L

## 2023-06-06 PROCEDURE — 76641 ULTRASOUND BREAST COMPLETE: CPT

## 2023-06-06 PROCEDURE — 77066 DX MAMMO INCL CAD BI: CPT | Mod: 26

## 2023-06-06 PROCEDURE — G0279: CPT | Mod: 26

## 2023-06-06 PROCEDURE — 76641 ULTRASOUND BREAST COMPLETE: CPT | Mod: 26,50,GA

## 2023-06-06 PROCEDURE — 71250 CT THORAX DX C-: CPT | Mod: 26,MH

## 2023-06-06 PROCEDURE — G0279: CPT

## 2023-06-06 PROCEDURE — 77066 DX MAMMO INCL CAD BI: CPT

## 2023-06-06 PROCEDURE — 71250 CT THORAX DX C-: CPT

## 2023-06-08 ENCOUNTER — APPOINTMENT (OUTPATIENT)
Dept: FAMILY MEDICINE | Facility: CLINIC | Age: 78
End: 2023-06-08
Payer: MEDICARE

## 2023-06-08 VITALS
BODY MASS INDEX: 21.79 KG/M2 | DIASTOLIC BLOOD PRESSURE: 82 MMHG | SYSTOLIC BLOOD PRESSURE: 135 MMHG | HEART RATE: 85 BPM | RESPIRATION RATE: 16 BRPM | WEIGHT: 123 LBS | HEIGHT: 63 IN

## 2023-06-08 DIAGNOSIS — Z01.818 ENCOUNTER FOR OTHER PREPROCEDURAL EXAMINATION: ICD-10-CM

## 2023-06-08 PROCEDURE — 99214 OFFICE O/P EST MOD 30 MIN: CPT

## 2023-06-08 NOTE — ASSESSMENT
[Patient Optimized for Surgery] : Patient optimized for surgery [No Further Testing Recommended] : no further testing recommended [As per surgery] : as per surgery [FreeTextEntry4] : \par Ms. 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 Per surgical request, no lab work nor EKG required for this procedure and she will have IV sedation with or w/o block. Patient has greater than 4 METS, is a moderate  perioperative risk for Major adverse cardiac eventNo further testing indicated at this time. Patient is medically optimized for this procedure. \par \par During conversation with patient extensive medical records were reviewed including but not limited to hospital records, out patient records, laboratory data \par In addition extensive time was also spent in reviewing diagnostic studies.\par \par Avoid NSAIDs, vitamins and aspirin containing products prior to surgery\par \par Counseling included abnormal lab results, differential diagnoses, treatment options, risks and benefits, lifestyle changes, current condition, medications, and dose adjustments. \par The patient was interactive, attentive, asked questions, and verbalized understanding.\par \par \par Referring to vasc surgery for her venous insifuciency

## 2023-06-08 NOTE — PHYSICAL EXAM
[Normal] : affect was normal and insight and judgment were intact [de-identified] : chronic venous changes in both legs. No wounds, bleeding, drainage

## 2023-06-08 NOTE — HISTORY OF PRESENT ILLNESS
[No Pertinent Cardiac History] : no history of aortic stenosis, atrial fibrillation, coronary artery disease, recent myocardial infarction, or implantable device/pacemaker [No Pertinent Pulmonary History] : no history of asthma, COPD, sleep apnea, or smoking [Family Member] : family member with adverse anesthesia reaction/sudden death [(Patient denies any chest pain, claudication, dyspnea on exertion, orthopnea, palpitations or syncope)] : Patient denies any chest pain, claudication, dyspnea on exertion, orthopnea, palpitations or syncope [Self] : no previous adverse anesthesia reaction [Chronic Anticoagulation] : no chronic anticoagulation [Chronic Kidney Disease] : no chronic kidney disease [Diabetes] : no diabetes [FreeTextEntry1] : Cataract [FreeTextEntry2] : 06/16/2023 [FreeTextEntry3] : Glenn Person [FreeTextEntry4] : Ms. 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 Patient comes to the office for perioperative evaluation for cataract surgery with Dr Person. Per surgical request, no labwork nor EKG required for this procedure and she will have IV sedation with or w/o block.\par Patient reports that is able to walk 4 blocks or a flight of stairs without getting chest pain, palpitations s or shortness of breath.  \par She is also requesting a referral to a new vasc surgeon for chronic leg vascular issues. \par \par Heme/onc: Yanna\par [facility] surgeon: Carine [FreeTextEntry5] : brother gets a rash with anesthesia

## 2023-06-14 ENCOUNTER — NON-APPOINTMENT (OUTPATIENT)
Age: 78
End: 2023-06-14

## 2023-06-16 ENCOUNTER — RX ONLY (RX ONLY)
Age: 78
End: 2023-06-16

## 2023-06-16 ENCOUNTER — ASC (OUTPATIENT)
Dept: URBAN - METROPOLITAN AREA SURGERY 8 | Facility: SURGERY | Age: 78
Setting detail: OPHTHALMOLOGY
End: 2023-06-16
Payer: MEDICARE

## 2023-06-16 DIAGNOSIS — H25.12: ICD-10-CM

## 2023-06-16 PROCEDURE — 68841 INSJ RX ELUT IMPLT LAC CANAL: CPT | Performed by: SPECIALIST

## 2023-06-16 PROCEDURE — 66984 XCAPSL CTRC RMVL W/O ECP: CPT | Performed by: SPECIALIST

## 2023-06-17 ENCOUNTER — OFFICE (OUTPATIENT)
Dept: URBAN - METROPOLITAN AREA CLINIC 104 | Facility: CLINIC | Age: 78
Setting detail: OPHTHALMOLOGY
End: 2023-06-17
Payer: MEDICARE

## 2023-06-17 DIAGNOSIS — Z96.1: ICD-10-CM

## 2023-06-17 PROCEDURE — 99024 POSTOP FOLLOW-UP VISIT: CPT | Performed by: OPTOMETRIST

## 2023-06-17 ASSESSMENT — REFRACTION_CURRENTRX
OD_AXIS: 0
OD_AXIS: 0
OS_AXIS: 0
OS_CYLINDER: -0.25
OS_SPHERE: +1.50
OS_VPRISM_DIRECTION: SV
OS_CYLINDER: SPH
OS_VPRISM_DIRECTION: SV
OD_OVR_VA: 20/
OS_OVR_VA: 20/
OD_CYLINDER: SPH
OS_AXIS: 0
OD_VPRISM_DIRECTION: SV
OD_OVR_VA: 20/
OD_SPHERE: +1.50
OD_CYLINDER: SPH
OD_SPHERE: +4.25
OS_OVR_VA: 20/
OS_SPHERE: +4.00
OD_VPRISM_DIRECTION: SV

## 2023-06-17 ASSESSMENT — AXIALLENGTH_DERIVED
OS_AL: 22.53
OD_AL: 22.84

## 2023-06-17 ASSESSMENT — REFRACTION_AUTOREFRACTION
OS_AXIS: 010
OD_AXIS: 175
OD_CYLINDER: -1.25
OS_CYLINDER: -0.75
OS_SPHERE: +2.50
OD_SPHERE: +1.00

## 2023-06-17 ASSESSMENT — VISUAL ACUITY
OS_BCVA: 20/40
OD_BCVA: 20/30+1

## 2023-06-17 ASSESSMENT — SPHEQUIV_DERIVED
OD_SPHEQUIV: 0.375
OS_SPHEQUIV: 2.125

## 2023-06-17 ASSESSMENT — REFRACTION_MANIFEST
OS_VA1: 20/40
OD_AXIS: 180
OD_VA1: 20/50
OS_AXIS: 180
OD_SPHERE: +2.25
OU_VA: 20/40+2
OD_CYLINDER: SPH
OS_SPHERE: +1.75
OS_CYLINDER: SPH

## 2023-06-17 ASSESSMENT — CONFRONTATIONAL VISUAL FIELD TEST (CVF)
OD_FINDINGS: FULL
OS_FINDINGS: FULL

## 2023-06-17 ASSESSMENT — KERATOMETRY
OS_K2POWER_DIOPTERS: 44.88
OS_K1POWER_DIOPTERS: 43.66
OD_K2POWER_DIOPTERS: 45.49
OD_K1POWER_DIOPTERS: 44.94
OS_AXISANGLE_DEGREES: 127
OD_AXISANGLE_DEGREES: 044

## 2023-06-17 ASSESSMENT — TONOMETRY: OS_IOP_MMHG: 20

## 2023-06-17 ASSESSMENT — CORNEAL EDEMA - FOLDS/STRIAE: OS_FOLDSSTRIAE: 1+

## 2023-06-21 ENCOUNTER — OFFICE (OUTPATIENT)
Dept: URBAN - METROPOLITAN AREA CLINIC 104 | Facility: CLINIC | Age: 78
Setting detail: OPHTHALMOLOGY
End: 2023-06-21
Payer: MEDICARE

## 2023-06-21 DIAGNOSIS — Z96.1: ICD-10-CM

## 2023-06-21 DIAGNOSIS — H25.11: ICD-10-CM

## 2023-06-21 PROCEDURE — 99024 POSTOP FOLLOW-UP VISIT: CPT | Performed by: SPECIALIST

## 2023-06-21 PROCEDURE — 92136 OPHTHALMIC BIOMETRY: CPT | Performed by: SPECIALIST

## 2023-06-21 ASSESSMENT — TONOMETRY: OS_IOP_MMHG: 17

## 2023-06-21 ASSESSMENT — VISUAL ACUITY
OD_BCVA: 20/25
OS_BCVA: 20/40

## 2023-06-21 ASSESSMENT — REFRACTION_CURRENTRX
OD_OVR_VA: 20/
OS_OVR_VA: 20/
OS_OVR_VA: 20/
OD_OVR_VA: 20/

## 2023-06-21 ASSESSMENT — CORNEAL EDEMA - FOLDS/STRIAE: OS_FOLDSSTRIAE: 1+

## 2023-06-30 ENCOUNTER — ASC (OUTPATIENT)
Dept: URBAN - METROPOLITAN AREA SURGERY 8 | Facility: SURGERY | Age: 78
Setting detail: OPHTHALMOLOGY
End: 2023-06-30
Payer: MEDICARE

## 2023-06-30 DIAGNOSIS — H25.11: ICD-10-CM

## 2023-06-30 PROCEDURE — 66984 XCAPSL CTRC RMVL W/O ECP: CPT | Performed by: SPECIALIST

## 2023-06-30 PROCEDURE — 68841 INSJ RX ELUT IMPLT LAC CANAL: CPT | Performed by: SPECIALIST

## 2023-07-01 ENCOUNTER — OFFICE (OUTPATIENT)
Dept: URBAN - METROPOLITAN AREA CLINIC 104 | Facility: CLINIC | Age: 78
Setting detail: OPHTHALMOLOGY
End: 2023-07-01
Payer: MEDICARE

## 2023-07-01 DIAGNOSIS — Z96.1: ICD-10-CM

## 2023-07-01 PROCEDURE — 99024 POSTOP FOLLOW-UP VISIT: CPT | Performed by: OPTOMETRIST

## 2023-07-01 ASSESSMENT — CONFRONTATIONAL VISUAL FIELD TEST (CVF)
OD_FINDINGS: FULL
OS_FINDINGS: FULL

## 2023-07-01 ASSESSMENT — TONOMETRY: OD_IOP_MMHG: 19

## 2023-07-01 ASSESSMENT — CORNEAL EDEMA - FOLDS/STRIAE: OS_FOLDSSTRIAE: 1+

## 2023-07-01 ASSESSMENT — VISUAL ACUITY
OD_BCVA: 20/25
OS_BCVA: 20/25

## 2023-07-06 ENCOUNTER — OFFICE (OUTPATIENT)
Dept: URBAN - METROPOLITAN AREA CLINIC 104 | Facility: CLINIC | Age: 78
Setting detail: OPHTHALMOLOGY
End: 2023-07-06
Payer: MEDICARE

## 2023-07-06 DIAGNOSIS — Z96.1: ICD-10-CM

## 2023-07-06 PROCEDURE — 99024 POSTOP FOLLOW-UP VISIT: CPT | Performed by: OPTOMETRIST

## 2023-07-06 ASSESSMENT — REFRACTION_AUTOREFRACTION
OS_CYLINDER: -1.00
OD_CYLINDER: -0.75
OS_AXIS: 058
OS_SPHERE: +1.25
OD_SPHERE: +0.75
OD_AXIS: 171

## 2023-07-06 ASSESSMENT — KERATOMETRY
OS_K2POWER_DIOPTERS: 44.53
OD_K2POWER_DIOPTERS: 45.00
OD_K1POWER_DIOPTERS: 44.64
OD_AXISANGLE_DEGREES: 078
OS_AXISANGLE_DEGREES: 145
OS_K1POWER_DIOPTERS: 43.60

## 2023-07-06 ASSESSMENT — AXIALLENGTH_DERIVED
OD_AL: 22.98
OS_AL: 23.1

## 2023-07-06 ASSESSMENT — TONOMETRY
OD_IOP_MMHG: 16
OS_IOP_MMHG: 16

## 2023-07-06 ASSESSMENT — VISUAL ACUITY
OS_BCVA: 20/20-1
OD_BCVA: 20/20-1

## 2023-07-06 ASSESSMENT — SPHEQUIV_DERIVED
OS_SPHEQUIV: 0.75
OD_SPHEQUIV: 0.375

## 2023-07-06 ASSESSMENT — CORNEAL EDEMA - FOLDS/STRIAE: OS_FOLDSSTRIAE: ABSENT

## 2023-07-10 ENCOUNTER — OFFICE (OUTPATIENT)
Dept: URBAN - METROPOLITAN AREA CLINIC 104 | Facility: CLINIC | Age: 78
Setting detail: OPHTHALMOLOGY
End: 2023-07-10
Payer: MEDICARE

## 2023-07-10 DIAGNOSIS — H11.32: ICD-10-CM

## 2023-07-10 PROBLEM — Z96.1 PSEUDOPHAKIA ; BOTH EYES: Status: ACTIVE | Noted: 2023-06-17

## 2023-07-10 PROCEDURE — 99213 OFFICE O/P EST LOW 20 MIN: CPT | Performed by: OPTOMETRIST

## 2023-07-10 ASSESSMENT — AXIALLENGTH_DERIVED
OS_AL: 23.1
OD_AL: 22.98

## 2023-07-10 ASSESSMENT — KERATOMETRY
OD_K1POWER_DIOPTERS: 44.64
OS_K1POWER_DIOPTERS: 43.60
OS_K2POWER_DIOPTERS: 44.53
OD_AXISANGLE_DEGREES: 078
OD_K2POWER_DIOPTERS: 45.00
OS_AXISANGLE_DEGREES: 145

## 2023-07-10 ASSESSMENT — REFRACTION_AUTOREFRACTION
OS_SPHERE: +1.25
OD_SPHERE: +0.75
OD_CYLINDER: -0.75
OD_AXIS: 171
OS_CYLINDER: -1.00
OS_AXIS: 058

## 2023-07-10 ASSESSMENT — CORNEAL EDEMA - FOLDS/STRIAE: OS_FOLDSSTRIAE: ABSENT

## 2023-07-10 ASSESSMENT — CONFRONTATIONAL VISUAL FIELD TEST (CVF)
OS_FINDINGS: FULL
OD_FINDINGS: FULL

## 2023-07-10 ASSESSMENT — SPHEQUIV_DERIVED
OS_SPHEQUIV: 0.75
OD_SPHEQUIV: 0.375

## 2023-07-10 ASSESSMENT — VISUAL ACUITY
OS_BCVA: 20/20
OD_BCVA: 20/25+2

## 2023-07-10 ASSESSMENT — TONOMETRY: OS_IOP_MMHG: 16

## 2023-08-07 ENCOUNTER — APPOINTMENT (OUTPATIENT)
Dept: DERMATOLOGY | Facility: CLINIC | Age: 78
End: 2023-08-07
Payer: MEDICARE

## 2023-08-07 PROCEDURE — 99213 OFFICE O/P EST LOW 20 MIN: CPT | Mod: 25

## 2023-08-07 PROCEDURE — 17110 DESTRUCTION B9 LES UP TO 14: CPT

## 2023-08-07 PROCEDURE — 17003 DESTRUCT PREMALG LES 2-14: CPT | Mod: 59

## 2023-08-07 PROCEDURE — 17000 DESTRUCT PREMALG LESION: CPT | Mod: 59

## 2023-08-10 ENCOUNTER — OFFICE (OUTPATIENT)
Dept: URBAN - METROPOLITAN AREA CLINIC 104 | Facility: CLINIC | Age: 78
Setting detail: OPHTHALMOLOGY
End: 2023-08-10
Payer: MEDICARE

## 2023-08-10 DIAGNOSIS — Z96.1: ICD-10-CM

## 2023-08-10 PROCEDURE — 99024 POSTOP FOLLOW-UP VISIT: CPT | Performed by: OPTOMETRIST

## 2023-08-10 ASSESSMENT — SUPERFICIAL PUNCTATE KERATITIS (SPK)
OD_SPK: 1+
OS_SPK: 1+

## 2023-08-10 ASSESSMENT — CONFRONTATIONAL VISUAL FIELD TEST (CVF)
OD_FINDINGS: FULL
OS_FINDINGS: FULL

## 2023-08-10 ASSESSMENT — TONOMETRY
OD_IOP_MMHG: 17
OS_IOP_MMHG: 17

## 2023-08-10 ASSESSMENT — VISUAL ACUITY
OD_BCVA: 20/25+1
OS_BCVA: 20/20-1

## 2023-08-23 ENCOUNTER — OUTPATIENT (OUTPATIENT)
Dept: OUTPATIENT SERVICES | Facility: HOSPITAL | Age: 78
LOS: 1 days | Discharge: HOME | End: 2023-08-23

## 2023-08-23 DIAGNOSIS — C85.10 UNSPECIFIED B-CELL LYMPHOMA, UNSPECIFIED SITE: ICD-10-CM

## 2023-08-23 DIAGNOSIS — Z90.710 ACQUIRED ABSENCE OF BOTH CERVIX AND UTERUS: Chronic | ICD-10-CM

## 2023-08-28 ENCOUNTER — APPOINTMENT (OUTPATIENT)
Dept: HEMATOLOGY ONCOLOGY | Facility: CLINIC | Age: 78
End: 2023-08-28
Payer: MEDICARE

## 2023-08-28 ENCOUNTER — RESULT REVIEW (OUTPATIENT)
Age: 78
End: 2023-08-28

## 2023-08-28 VITALS
BODY MASS INDEX: 22.11 KG/M2 | DIASTOLIC BLOOD PRESSURE: 75 MMHG | SYSTOLIC BLOOD PRESSURE: 130 MMHG | WEIGHT: 124.78 LBS | HEART RATE: 95 BPM | OXYGEN SATURATION: 96 % | HEIGHT: 63 IN

## 2023-08-28 LAB
BASOPHILS # BLD AUTO: 0.1 K/UL — SIGNIFICANT CHANGE UP (ref 0–0.2)
BASOPHILS NFR BLD AUTO: 1.1 % — SIGNIFICANT CHANGE UP (ref 0–2)
EOSINOPHIL # BLD AUTO: 0.1 K/UL — SIGNIFICANT CHANGE UP (ref 0–0.5)
EOSINOPHIL NFR BLD AUTO: 1.1 % — SIGNIFICANT CHANGE UP (ref 0–6)
HCT VFR BLD CALC: 38.1 % — SIGNIFICANT CHANGE UP (ref 34.5–45)
HGB BLD-MCNC: 13.1 G/DL — SIGNIFICANT CHANGE UP (ref 11.5–15.5)
LYMPHOCYTES # BLD AUTO: 2.6 K/UL — SIGNIFICANT CHANGE UP (ref 1–3.3)
LYMPHOCYTES # BLD AUTO: 48.8 % — HIGH (ref 13–44)
MCHC RBC-ENTMCNC: 31.8 PG — SIGNIFICANT CHANGE UP (ref 27–34)
MCHC RBC-ENTMCNC: 34.5 G/DL — SIGNIFICANT CHANGE UP (ref 32–36)
MCV RBC AUTO: 92.2 FL — SIGNIFICANT CHANGE UP (ref 80–100)
MONOCYTES # BLD AUTO: 0.1 K/UL — SIGNIFICANT CHANGE UP (ref 0–0.9)
MONOCYTES NFR BLD AUTO: 2.5 % — SIGNIFICANT CHANGE UP (ref 2–14)
NEUTROPHILS # BLD AUTO: 2.5 K/UL — SIGNIFICANT CHANGE UP (ref 1.8–7.4)
NEUTROPHILS NFR BLD AUTO: 46.4 % — SIGNIFICANT CHANGE UP (ref 43–77)
PLATELET # BLD AUTO: 211 K/UL — SIGNIFICANT CHANGE UP (ref 150–400)
RBC # BLD: 4.13 M/UL — SIGNIFICANT CHANGE UP (ref 3.8–5.2)
RBC # FLD: 12.6 % — SIGNIFICANT CHANGE UP (ref 10.3–14.5)
WBC # BLD: 5.4 K/UL — SIGNIFICANT CHANGE UP (ref 3.8–10.5)
WBC # FLD AUTO: 5.4 K/UL — SIGNIFICANT CHANGE UP (ref 3.8–10.5)

## 2023-08-28 PROCEDURE — 99215 OFFICE O/P EST HI 40 MIN: CPT

## 2023-08-28 NOTE — ADDENDUM
[FreeTextEntry1] : Documented by Erin Doan acting as scribe for Dr. Raines on 08/28/2023   All Medical record entries made by the Scribe were at my, Dr. Raines's, direction and personally dictated by me on 08/28/2023 . I have reviewed the chart and agree that the record accurately reflects my personal performance of the history, physical exam, assessment and plan. I have also personally directed, reviewed, and agreed with the discharge instructions.

## 2023-08-28 NOTE — RESULTS/DATA
[FreeTextEntry1] : \par  \par  9/30/22 Bone Density: Osteoporosis\par  \par  CT Chest on 6/7/22:  Stable left upper lobe 2 cm ground-glass nodule with 0.4 cm solid component. Follow-up is recommended.\par  \par  8/31/22: Biopsy of Right inner quadrant of breast Path C/w DCIS intermediate nuclear grade, cribriform and micropapillary, with calcifications and without necrosis ER 90%, AR 80 % ADH associated with calcification\par  \par  2/1/22 Pathology \par  Lymph node, inguinal, left,   US guided biopsy :  _\par       - CD5 positive low grade B cell lymphoma\par  -Immunohistochemical stains   for CD3, CD5, CD20, PAX5, CD10, BCL6, BCL2, CyclinD1, Ki67, CD23, CD21, CD43, CD79a, MUM1, [id number] performed on\par   formalin fixed paraffin embedded tissue, block 1A.\par  -Neoplastic cells are:  \par  Positive:   CD5, CD20, PAX5, BCL-2, CD79a, CD43 (subset)\par  Negative:   CD10, BCL6, CYCLIN D-1, CD23, MUM-1, LEF-1\par  Other:   Ki67 proliferation index is 10% overall\par  CD3 stain highlights small T cells in the background\par  CD21 and CD23 stains  highlight small residual follicular dendritic cellmeshwork\par  SOX11 stain is negative.\par  \par  -Flow cytometry analysis (80-SN-):    Flow cytometric analysis\par   attempted however, insufficient cells to report flow cytometry.  \par  \par  \par  12/3/21 PET/CT \par  CHEST: Mildly FDG avid left greater than right axillary lymph nodes with reference 1.8 x 1.3 cm left axillary node (SUV 3.0). Mediastinum and hilar regions are unremarkable\par  LUNGS: Minimally FDG avid 2.2 x 1.3 cm ground glass nodule in the left upper lung (SUV 1.8), not significantly changed from CT on 8/5/2021.\par  ABDOMINOPELVIC NODES: No enlarged or FDG-avid lymph node in the abdomen or pelvis. A few non-FDG avid retroperitoneal and mesenteric lymph nodes. Reference non-FDG avid para-aortic lymph nodes measures 1.5 x 1.2 cm . Reference largest mesenteric node just posterior to the transverse colon measures 1 x 0.5 cm (image 187). Non-FDG avid right obturator node measures 1.7 x 0.7 cm (image 117). Nonspecific minimally FDG avid small and mildly enlarged bilateral inguinal with a reference 2 x 1.3\par  cm left inguinal node with SUV 3.9. Reference 1 cm left inguinal node with SUV 1.7\par  \par  IMPRESSION:  FDG-PET/CT scan demonstrates:\par  \par  1. Nonspecific mildly FDG avid bilateral axillary and bilateral mediastinal lymph nodes. The left\par  inguinal node is amenable to ultrasound-guided FNA biopsy as indicated.\par  \par  2. Nonspecific FDG avidity in the distal third of the esophagus with questionable wall thickening on\par  CT. This may be further evaluated with endoscopy as warranted.\par  \par  3. Nonspecific minimally FDG avid groundglass nodule in the left upper lung, unchanged on CT from\par  8/5/2021. This is nonspecific. Malignancy is with low-grade FDG activity assessed as\par  adenocarcinoma in situ may present in a similar fashion.\par  \par  4. Non-FDG avid retroperitoneal, pelvic and mesenteric lymph nodes.\par  \par  \par  \par  \par  11/23/21 CT Abd/Pel\par   - Retroperitoneal adenopathy, with representative para-aortic node measuring to 1.8 x 1.2 cm.\par  - mesenteric adenopathy, with representative node measuring 1.6 x 1.4 cm \par  - Left inguinal adenopathy measure up to 1.9 x 1.4 cm \par  \par  8/5/2021 CT Chest no contrast\par  - stable left apex 2.0 cm ground glass nodule with 0.4 cm solid component \par  \par  \par  6/29/21 U/S Venous Duplex\par  -enlarged lymph nodes in the left groin 2.4 x 1.1 cm medially and 1.5 x 0.5 cm laterally. \par  -there is no DVT or SVT bilaterally RLE - GSV not visualized. +torturous incompetent tributaries measuring 3.0-3.2 mm. LLE - LSV insufficiency and torturous incompetent tributaries measuring 2.7-4.5 mm. \par

## 2023-08-28 NOTE — ASSESSMENT
[FreeTextEntry1] : 77 year old female with PMHx of HLD, GERD and chronic diarrhea with no smoking history with CT Abd/Pel performed on 11/23/21 with adenopathy   PET/CT performed on  12/3/21 demonstrating mildly FDG avid left greater than right axillary lymph nodes with reference 1.8 x 1.3 cm left axillary node (SUV 3.0).   Minimally FDG avid 2.2 x 1.3 cm ground glass nodule in the left upper lung (SUV 1.8), not significantly changed from CT on 8/5/2021. -  non-FDG avid retroperitoneal  1.5 x 1.2 cm  and mesenteric1 x 0.5 cm. lymph nodes.  -Non-FDG avid right obturator node measures 1.7 x 0.7 cm  .  -Nonspecific minimally FDG avid small and mildly enlarged bilateral inguinal with a reference 2 x 1.3 cm left inguinal node with SUV 3.9. Reference 1 cm left inguinal node with SUV 1.7  s/p core biopsy of  left inguinal node performed on 2/1/22 with Dr. Brown.  Pathology c/w CD5 positive low grade B cell lymphoma. CD5+ve, CD10 -ve CD 23 -ve, Cyclin D1 and SOX 11 negative   Ki67 proliferation index is 10% overall  #St IV SLL nO B symptoms  - Last CBC wnl  - Send CBC / CMP anemia w/u  - slightly low IgG Ig A and IgM , no infections M spike 0.2  IgG kappa Band identified  -CLL FISH: 12q del, Igvh mutated - discussed Velásquez transformation  - Patient with axillary node Biopsy on 7/21/22  +ve for - CD5 positive Low Grade B- Cell Lymphoma. No B symptoms.  - Repeat CT chest December 2023  #Left upper lobe nodule:  -  2.2 x 1.3 cm ground glass nodule in the left upper lung (SUV 1.8) stable from aug 2021 CT CHEST: Stable left upper lobe 2 cm groundglass nodule with 0.4 cm solid component.  - discussed with Dr Arellano,  -6/6/23 CT Chest: Groundglass opacity in the left upper lobe is increased in size 2.8 x 1.9 cm (previously 2.0 x 1.2 cm) noted again with solid component. New groundglass opacities superiorly measuring 1.3 cm and inferior medially multiple small groundglass opacities identified. -Advised to f/u with thoracic surgeon Dr. Piper  #DCIS Rt breast  - s/p lumpectomy Biopsy of Right inner quadrant of breast Path C/w DCIS intermediate nuclear grade, cribriform and micropapillary, with calcifications and without necrosis ER 90%, WV 80 %  ADH associated with calcification S/p Lumpectomy on 8/31/22 Path c/w DCIS 1.3 cm, with 2mm margin  - no ho Arthritis.  -9/30/22 Bone Density: Osteoporosis, never has been treated for osteoporosis in the past. -S/p Hysterectomy in 1993 -Patient took Anastrozole for 6 days and self d/c'd  Reported hot flashes, musculoskeletal pain, insomnia 2/2 to Anastrozole. Discussed switching patient to letrozole also discussed Tamoxifen ( given Osteoporosis ) She is s/p Hysterectomy along with osteoporosis so might be a good choice for her, AE of PE / DVT.  -Patient planned not to move forward RT. She is not interested ai taking any chemo PPX ( AI/ or Loco,)  -6/6/23 mammo / sono: No mammographic or sonographic evidence of malignancy. New posttreatment changes in the right breast. -Next mammo/sono due 6/2024 -Will refer patient to breast surgeon Dr. Oliva -Advised to obtain wellness screening -Raised nodule on vulva- Establish care with GYN if nodule increases -F/u in 4 months

## 2023-08-28 NOTE — HISTORY OF PRESENT ILLNESS
[de-identified] : 77 year old female former smoker 1/2 PPD for 10-15 years with PMHx of HLD, GERD and chronic diarrhea, hx of hysterectomy here for initial visit referred by Pulmonologist, Dr. Shields.  Patient presented to vascular specialist, Dr. Rubio, in June 2021 for routine follow up for venous insufficiency. Venous doppler performed on 6/29/21 demonstrated enlarged lymph nodes in the left groin 2.4 x 1.1 cm medially and 1.5 x 0.5 cm laterally. \par  Subsequent CT Abd/Pel performed on 11/23/21 revealing retroperitoneal adenopathy, with representative para-aortic node measuring to 1.8 x 1.2 cm. Mesenteric adenopathy, with representative node measuring 1.6 x 1.4 cm and  left inguinal adenopathy measure up to 1.9 x 1.4 cm.\par  PET/CT performed on  12/3/21 demonstrating mildly FDG avid left greater than right axillary lymph nodes with reference 1.8 x 1.3 cm left axillary node (SUV 3.0).  Minimally FDG avid 2.2 x 1.3 cm ground glass nodule in the left upper lung (SUV 1.8), not significantly changed from CT on 8/5/2021.No enlarged or FDG-avid lymph node in the abdomen or pelvis. A few non-FDG avid retroperitoneal and mesenteric lymph nodes. Reference non-FDG avid para-aortic lymph nodes measures 1.5 x 1.2 cm . Reference largest mesenteric node just posterior to the transverse colon measures 1 x 0.5 cm. Non-FDG avid right obturator node measures 1.7 x 0.7 cm  . Nonspecific minimally FDG avid small and mildly enlarged bilateral inguinal with a reference 2 x 1.3 cm left inguinal node with SUV 3.9. Reference 1 cm left inguinal node with SUV 1.7. CT CHEST on 7/1/20; 1.4 x 1.3 cm ground glass and solid density in the left uppper lobe apex with adjacent 1.0 x 0.4cm pleural-based thickening \par  \par  \par  Patient evaluated by Dr. Camacho on 12/9/21 and it was decided to move forward with core biopsy. Biopsy of left inguinal node performed on 2/1/22 with Dr. Brown. \par  Pathology revealed CD5 positive low grade B cell lymphoma.\par  Neoplastic cells are:  \par  Positive:   CD5, CD20, PAX5, BCL-2, CD79a, CD43 (subset)\par  Negative:   CD10, BCL6, CYCLIN D-1, CD23, MUM-1, LEF-1\par  Other:   Ki67 proliferation index is 10% overall\par  CD3 stain highlights small T cells in the background\par  CD21 and CD23 stains  highlight small residual follicular dendritic cellmeshwork\par  SOX11 stain is negative.\par  \par  -Flow cytometry analysis:    Flow cytometric analysis attempted however, insufficient cells to report flow cytometry.  \par  \par  Social hx: Quit smoking at age 55 about 20 years ago \par   [de-identified] : Patient presents for a f/u  S/p Lumpectomy on 8/31/22 Path c/w DCIS 1.3 cm, with 2mm margin ,   Patient with axillary node Biopsy on 7/21/22  +ve for - CD5 positive Low Grade B- Cell Lymphoma. No B symptoms.   Patient doing well Decided not to proceed with RT Patient took Anastrozole for 6 days and self d/c'd  Reported hot flashes, musculoskeletal pain, insomnia 2/2 to Anastrozole Patient reports painful raised lesion on vulva, slightly improved with warm compress Patient had repeat CT CHEST on 6/6/23 with increase in size of left upper lobe opacity and increasing solid component  Still has not made appointment with dR amezcua Stressed importance

## 2023-08-31 ENCOUNTER — OFFICE (OUTPATIENT)
Dept: URBAN - METROPOLITAN AREA CLINIC 104 | Facility: CLINIC | Age: 78
Setting detail: OPHTHALMOLOGY
End: 2023-08-31
Payer: MEDICARE

## 2023-08-31 DIAGNOSIS — Z96.1: ICD-10-CM

## 2023-08-31 PROBLEM — H16.223 DRY EYE SYNDROME K SICCA; BOTH EYES: Status: ACTIVE | Noted: 2023-08-10

## 2023-08-31 PROCEDURE — 99024 POSTOP FOLLOW-UP VISIT: CPT | Performed by: OPTOMETRIST

## 2023-08-31 ASSESSMENT — REFRACTION_MANIFEST
OD_VA1: 20/20
OS_SPHERE: +0.25
OD_ADD: +2.25
OD_SPHERE: PLANO
OU_VA: 20/20
OS_ADD: +2.25
OS_VA1: 20/20

## 2023-08-31 ASSESSMENT — VISUAL ACUITY
OD_BCVA: 20/20-1
OS_BCVA: 20/20

## 2023-08-31 ASSESSMENT — CONFRONTATIONAL VISUAL FIELD TEST (CVF)
OD_FINDINGS: FULL
OS_FINDINGS: FULL

## 2023-08-31 ASSESSMENT — SUPERFICIAL PUNCTATE KERATITIS (SPK)
OD_SPK: 1+
OS_SPK: 1+

## 2023-08-31 ASSESSMENT — TONOMETRY
OD_IOP_MMHG: 20
OS_IOP_MMHG: 20

## 2023-08-31 ASSESSMENT — SPHEQUIV_DERIVED: OS_SPHEQUIV: 0.375

## 2023-08-31 ASSESSMENT — REFRACTION_AUTOREFRACTION
OS_SPHERE: +0.50
OS_AXIS: 060
OD_CYLINDER: SPH
OS_CYLINDER: -0.25
OD_SPHERE: +1.00

## 2023-09-06 LAB
ALBUMIN SERPL ELPH-MCNC: 4.4 G/DL
ALP BLD-CCNC: 76 U/L
ALT SERPL-CCNC: 13 U/L
ANION GAP SERPL CALC-SCNC: 11 MMOL/L
AST SERPL-CCNC: 15 U/L
BILIRUB SERPL-MCNC: 0.4 MG/DL
BUN SERPL-MCNC: 15 MG/DL
CALCIUM SERPL-MCNC: 9.8 MG/DL
CHLORIDE SERPL-SCNC: 104 MMOL/L
CO2 SERPL-SCNC: 26 MMOL/L
CREAT SERPL-MCNC: 0.67 MG/DL
EGFR: 90 ML/MIN/1.73M2
GLUCOSE SERPL-MCNC: 109 MG/DL
POTASSIUM SERPL-SCNC: 4.7 MMOL/L
PROT SERPL-MCNC: 5.7 G/DL
SODIUM SERPL-SCNC: 140 MMOL/L

## 2023-09-18 ENCOUNTER — APPOINTMENT (OUTPATIENT)
Dept: PULMONOLOGY | Facility: CLINIC | Age: 78
End: 2023-09-18
Payer: MEDICARE

## 2023-09-18 VITALS
RESPIRATION RATE: 16 BRPM | HEART RATE: 92 BPM | SYSTOLIC BLOOD PRESSURE: 130 MMHG | OXYGEN SATURATION: 97 % | DIASTOLIC BLOOD PRESSURE: 80 MMHG

## 2023-09-18 VITALS — WEIGHT: 122 LBS | HEIGHT: 61 IN | BODY MASS INDEX: 23.03 KG/M2

## 2023-09-18 DIAGNOSIS — R91.8 OTHER NONSPECIFIC ABNORMAL FINDING OF LUNG FIELD: ICD-10-CM

## 2023-09-18 DIAGNOSIS — J44.9 CHRONIC OBSTRUCTIVE PULMONARY DISEASE, UNSPECIFIED: ICD-10-CM

## 2023-09-18 DIAGNOSIS — Z01.811 ENCOUNTER FOR PREPROCEDURAL RESPIRATORY EXAMINATION: ICD-10-CM

## 2023-09-18 DIAGNOSIS — R05.9 COUGH, UNSPECIFIED: ICD-10-CM

## 2023-09-18 PROCEDURE — 94010 BREATHING CAPACITY TEST: CPT

## 2023-09-18 PROCEDURE — 85018 HEMOGLOBIN: CPT | Mod: QW

## 2023-09-18 PROCEDURE — 99215 OFFICE O/P EST HI 40 MIN: CPT | Mod: 25

## 2023-09-18 PROCEDURE — 94729 DIFFUSING CAPACITY: CPT

## 2023-09-18 RX ORDER — METHYLPREDNISOLONE 4 MG/1
4 TABLET ORAL
Qty: 1 | Refills: 3 | Status: DISCONTINUED | COMMUNITY
Start: 2023-05-18 | End: 2023-09-18

## 2023-09-18 RX ORDER — KETOROLAC TROMETHAMINE 5 MG/ML
0.5 SOLUTION OPHTHALMIC
Qty: 5 | Refills: 0 | Status: DISCONTINUED | COMMUNITY
Start: 2023-05-13

## 2023-09-18 RX ORDER — DOXYCYCLINE HYCLATE 100 MG/1
100 CAPSULE ORAL
Qty: 10 | Refills: 2 | Status: DISCONTINUED | COMMUNITY
Start: 2023-05-18 | End: 2023-09-18

## 2023-09-18 RX ORDER — OFLOXACIN 3 MG/ML
0.3 SOLUTION/ DROPS OPHTHALMIC
Qty: 10 | Refills: 0 | Status: DISCONTINUED | COMMUNITY
Start: 2023-05-13

## 2023-09-18 RX ORDER — LEVOFLOXACIN 500 MG/1
500 TABLET, FILM COATED ORAL DAILY
Qty: 7 | Refills: 0 | Status: DISCONTINUED | COMMUNITY
Start: 2023-05-10 | End: 2023-09-18

## 2023-09-18 RX ORDER — HYDROCODONE BITARTRATE AND HOMATROPINE METHYLBROMIDE 1.5; 5 MG/5ML; MG/5ML
5-1.5 SOLUTION ORAL 3 TIMES DAILY
Qty: 1 | Refills: 0 | Status: DISCONTINUED | COMMUNITY
Start: 2022-12-19 | End: 2023-09-18

## 2023-09-18 RX ORDER — BENZONATATE 100 MG/1
100 CAPSULE ORAL
Qty: 30 | Refills: 0 | Status: DISCONTINUED | COMMUNITY
Start: 2023-05-15

## 2023-09-20 ENCOUNTER — APPOINTMENT (OUTPATIENT)
Dept: THORACIC SURGERY | Facility: CLINIC | Age: 78
End: 2023-09-20
Payer: MEDICARE

## 2023-09-20 VITALS
BODY MASS INDEX: 23.03 KG/M2 | SYSTOLIC BLOOD PRESSURE: 152 MMHG | WEIGHT: 122 LBS | HEART RATE: 90 BPM | DIASTOLIC BLOOD PRESSURE: 82 MMHG | HEIGHT: 61 IN | RESPIRATION RATE: 16 BRPM | OXYGEN SATURATION: 99 %

## 2023-09-20 PROCEDURE — 99204 OFFICE O/P NEW MOD 45 MIN: CPT

## 2023-09-26 ENCOUNTER — APPOINTMENT (OUTPATIENT)
Dept: SURGERY | Facility: CLINIC | Age: 78
End: 2023-09-26
Payer: MEDICARE

## 2023-09-26 VITALS
BODY MASS INDEX: 23.03 KG/M2 | DIASTOLIC BLOOD PRESSURE: 68 MMHG | SYSTOLIC BLOOD PRESSURE: 116 MMHG | WEIGHT: 122 LBS | HEIGHT: 61 IN

## 2023-09-26 DIAGNOSIS — C50.911 MALIGNANT NEOPLASM OF UNSPECIFIED SITE OF RIGHT FEMALE BREAST: ICD-10-CM

## 2023-09-26 PROCEDURE — 99214 OFFICE O/P EST MOD 30 MIN: CPT

## 2023-09-28 ENCOUNTER — NON-APPOINTMENT (OUTPATIENT)
Age: 78
End: 2023-09-28

## 2023-09-28 ENCOUNTER — APPOINTMENT (OUTPATIENT)
Dept: CARDIOLOGY | Facility: CLINIC | Age: 78
End: 2023-09-28
Payer: MEDICARE

## 2023-09-28 VITALS — SYSTOLIC BLOOD PRESSURE: 112 MMHG | DIASTOLIC BLOOD PRESSURE: 70 MMHG

## 2023-09-28 VITALS
HEIGHT: 61 IN | OXYGEN SATURATION: 96 % | HEART RATE: 84 BPM | DIASTOLIC BLOOD PRESSURE: 70 MMHG | WEIGHT: 124 LBS | SYSTOLIC BLOOD PRESSURE: 110 MMHG | BODY MASS INDEX: 23.41 KG/M2

## 2023-09-28 DIAGNOSIS — R91.1 SOLITARY PULMONARY NODULE: ICD-10-CM

## 2023-09-28 DIAGNOSIS — I87.2 VENOUS INSUFFICIENCY (CHRONIC) (PERIPHERAL): ICD-10-CM

## 2023-09-28 DIAGNOSIS — I25.10 ATHEROSCLEROTIC HEART DISEASE OF NATIVE CORONARY ARTERY W/OUT ANGINA PECTORIS: ICD-10-CM

## 2023-09-28 DIAGNOSIS — R06.02 SHORTNESS OF BREATH: ICD-10-CM

## 2023-09-28 DIAGNOSIS — Z01.810 ENCOUNTER FOR PREPROCEDURAL CARDIOVASCULAR EXAMINATION: ICD-10-CM

## 2023-09-28 PROCEDURE — 99214 OFFICE O/P EST MOD 30 MIN: CPT

## 2023-09-28 PROCEDURE — 93000 ELECTROCARDIOGRAM COMPLETE: CPT

## 2023-09-28 RX ORDER — FUROSEMIDE 40 MG/1
40 TABLET ORAL
Qty: 90 | Refills: 2 | Status: DISCONTINUED | COMMUNITY
Start: 2020-06-03 | End: 2023-09-28

## 2023-10-03 ENCOUNTER — NON-APPOINTMENT (OUTPATIENT)
Age: 78
End: 2023-10-03

## 2023-10-04 ENCOUNTER — OUTPATIENT (OUTPATIENT)
Dept: OUTPATIENT SERVICES | Facility: HOSPITAL | Age: 78
LOS: 1 days | End: 2023-10-04
Payer: MEDICARE

## 2023-10-04 ENCOUNTER — APPOINTMENT (OUTPATIENT)
Dept: ULTRASOUND IMAGING | Facility: CLINIC | Age: 78
End: 2023-10-04
Payer: MEDICARE

## 2023-10-04 DIAGNOSIS — R60.0 LOCALIZED EDEMA: ICD-10-CM

## 2023-10-04 DIAGNOSIS — I87.2 VENOUS INSUFFICIENCY (CHRONIC) (PERIPHERAL): ICD-10-CM

## 2023-10-04 PROCEDURE — 93970 EXTREMITY STUDY: CPT

## 2023-10-04 PROCEDURE — 93970 EXTREMITY STUDY: CPT | Mod: 26

## 2023-10-05 ENCOUNTER — APPOINTMENT (OUTPATIENT)
Dept: CARDIOLOGY | Facility: CLINIC | Age: 78
End: 2023-10-05
Payer: MEDICARE

## 2023-10-05 PROCEDURE — 93306 TTE W/DOPPLER COMPLETE: CPT

## 2023-10-08 ENCOUNTER — APPOINTMENT (OUTPATIENT)
Dept: MRI IMAGING | Facility: CLINIC | Age: 78
End: 2023-10-08

## 2023-10-11 ENCOUNTER — APPOINTMENT (OUTPATIENT)
Dept: ORTHOPEDIC SURGERY | Facility: CLINIC | Age: 78
End: 2023-10-11
Payer: MEDICARE

## 2023-10-11 VITALS
WEIGHT: 124 LBS | SYSTOLIC BLOOD PRESSURE: 148 MMHG | DIASTOLIC BLOOD PRESSURE: 83 MMHG | HEIGHT: 61 IN | BODY MASS INDEX: 23.41 KG/M2

## 2023-10-11 DIAGNOSIS — M89.9 DISORDER OF BONE, UNSPECIFIED: ICD-10-CM

## 2023-10-11 PROCEDURE — 72170 X-RAY EXAM OF PELVIS: CPT

## 2023-10-11 PROCEDURE — 73552 X-RAY EXAM OF FEMUR 2/>: CPT | Mod: RT

## 2023-10-11 PROCEDURE — 99204 OFFICE O/P NEW MOD 45 MIN: CPT

## 2023-10-12 ENCOUNTER — OUTPATIENT (OUTPATIENT)
Dept: OUTPATIENT SERVICES | Facility: HOSPITAL | Age: 78
LOS: 1 days | End: 2023-10-12
Payer: MEDICARE

## 2023-10-12 VITALS
HEIGHT: 62 IN | SYSTOLIC BLOOD PRESSURE: 132 MMHG | WEIGHT: 119.93 LBS | RESPIRATION RATE: 15 BRPM | TEMPERATURE: 98 F | DIASTOLIC BLOOD PRESSURE: 72 MMHG | HEART RATE: 79 BPM | OXYGEN SATURATION: 98 %

## 2023-10-12 DIAGNOSIS — Z90.710 ACQUIRED ABSENCE OF BOTH CERVIX AND UTERUS: Chronic | ICD-10-CM

## 2023-10-12 DIAGNOSIS — Z98.890 OTHER SPECIFIED POSTPROCEDURAL STATES: Chronic | ICD-10-CM

## 2023-10-12 DIAGNOSIS — Z01.818 ENCOUNTER FOR OTHER PREPROCEDURAL EXAMINATION: ICD-10-CM

## 2023-10-12 DIAGNOSIS — Z98.49 CATARACT EXTRACTION STATUS, UNSPECIFIED EYE: Chronic | ICD-10-CM

## 2023-10-12 LAB
ANION GAP SERPL CALC-SCNC: 2 MMOL/L — LOW (ref 5–17)
APTT BLD: 32.7 SEC — SIGNIFICANT CHANGE UP (ref 24.5–35.6)
APTT BLD: 32.7 SEC — SIGNIFICANT CHANGE UP (ref 24.5–35.6)
BASOPHILS # BLD AUTO: 0.03 K/UL — SIGNIFICANT CHANGE UP (ref 0–0.2)
BASOPHILS # BLD AUTO: 0.03 K/UL — SIGNIFICANT CHANGE UP (ref 0–0.2)
BASOPHILS NFR BLD AUTO: 0.4 % — SIGNIFICANT CHANGE UP (ref 0–2)
BASOPHILS NFR BLD AUTO: 0.4 % — SIGNIFICANT CHANGE UP (ref 0–2)
BLD GP AB SCN SERPL QL: SIGNIFICANT CHANGE UP
BUN SERPL-MCNC: 18 MG/DL — SIGNIFICANT CHANGE UP (ref 7–23)
CALCIUM SERPL-MCNC: 9.3 MG/DL — SIGNIFICANT CHANGE UP (ref 8.5–10.1)
CHLORIDE SERPL-SCNC: 110 MMOL/L — HIGH (ref 96–108)
CO2 SERPL-SCNC: 27 MMOL/L — SIGNIFICANT CHANGE UP (ref 22–31)
CREAT SERPL-MCNC: 0.54 MG/DL — SIGNIFICANT CHANGE UP (ref 0.5–1.3)
EGFR: 94 ML/MIN/1.73M2 — SIGNIFICANT CHANGE UP
EOSINOPHIL # BLD AUTO: 0 K/UL — SIGNIFICANT CHANGE UP (ref 0–0.5)
EOSINOPHIL # BLD AUTO: 0 K/UL — SIGNIFICANT CHANGE UP (ref 0–0.5)
EOSINOPHIL NFR BLD AUTO: 0 % — SIGNIFICANT CHANGE UP (ref 0–6)
EOSINOPHIL NFR BLD AUTO: 0 % — SIGNIFICANT CHANGE UP (ref 0–6)
GLUCOSE SERPL-MCNC: 122 MG/DL — HIGH (ref 70–99)
HCT VFR BLD CALC: 37.9 % — SIGNIFICANT CHANGE UP (ref 34.5–45)
HCT VFR BLD CALC: 37.9 % — SIGNIFICANT CHANGE UP (ref 34.5–45)
HGB BLD-MCNC: 13.1 G/DL — SIGNIFICANT CHANGE UP (ref 11.5–15.5)
HGB BLD-MCNC: 13.1 G/DL — SIGNIFICANT CHANGE UP (ref 11.5–15.5)
IMM GRANULOCYTES NFR BLD AUTO: 0.3 % — SIGNIFICANT CHANGE UP (ref 0–0.9)
IMM GRANULOCYTES NFR BLD AUTO: 0.3 % — SIGNIFICANT CHANGE UP (ref 0–0.9)
INR BLD: 0.92 RATIO — SIGNIFICANT CHANGE UP (ref 0.85–1.18)
INR BLD: 0.92 RATIO — SIGNIFICANT CHANGE UP (ref 0.85–1.18)
LYMPHOCYTES # BLD AUTO: 1.81 K/UL — SIGNIFICANT CHANGE UP (ref 1–3.3)
LYMPHOCYTES # BLD AUTO: 1.81 K/UL — SIGNIFICANT CHANGE UP (ref 1–3.3)
LYMPHOCYTES # BLD AUTO: 23.9 % — SIGNIFICANT CHANGE UP (ref 13–44)
LYMPHOCYTES # BLD AUTO: 23.9 % — SIGNIFICANT CHANGE UP (ref 13–44)
MCHC RBC-ENTMCNC: 32.4 PG — SIGNIFICANT CHANGE UP (ref 27–34)
MCHC RBC-ENTMCNC: 32.4 PG — SIGNIFICANT CHANGE UP (ref 27–34)
MCHC RBC-ENTMCNC: 34.6 GM/DL — SIGNIFICANT CHANGE UP (ref 32–36)
MCHC RBC-ENTMCNC: 34.6 GM/DL — SIGNIFICANT CHANGE UP (ref 32–36)
MCV RBC AUTO: 93.8 FL — SIGNIFICANT CHANGE UP (ref 80–100)
MCV RBC AUTO: 93.8 FL — SIGNIFICANT CHANGE UP (ref 80–100)
MONOCYTES # BLD AUTO: 0.21 K/UL — SIGNIFICANT CHANGE UP (ref 0–0.9)
MONOCYTES # BLD AUTO: 0.21 K/UL — SIGNIFICANT CHANGE UP (ref 0–0.9)
MONOCYTES NFR BLD AUTO: 2.8 % — SIGNIFICANT CHANGE UP (ref 2–14)
MONOCYTES NFR BLD AUTO: 2.8 % — SIGNIFICANT CHANGE UP (ref 2–14)
NEUTROPHILS # BLD AUTO: 5.51 K/UL — SIGNIFICANT CHANGE UP (ref 1.8–7.4)
NEUTROPHILS # BLD AUTO: 5.51 K/UL — SIGNIFICANT CHANGE UP (ref 1.8–7.4)
NEUTROPHILS NFR BLD AUTO: 72.6 % — SIGNIFICANT CHANGE UP (ref 43–77)
NEUTROPHILS NFR BLD AUTO: 72.6 % — SIGNIFICANT CHANGE UP (ref 43–77)
PLATELET # BLD AUTO: 309 K/UL — SIGNIFICANT CHANGE UP (ref 150–400)
PLATELET # BLD AUTO: 309 K/UL — SIGNIFICANT CHANGE UP (ref 150–400)
POTASSIUM SERPL-MCNC: 4.4 MMOL/L — SIGNIFICANT CHANGE UP (ref 3.5–5.3)
POTASSIUM SERPL-SCNC: 4.4 MMOL/L — SIGNIFICANT CHANGE UP (ref 3.5–5.3)
PROTHROM AB SERPL-ACNC: 10.4 SEC — SIGNIFICANT CHANGE UP (ref 9.5–13)
PROTHROM AB SERPL-ACNC: 10.4 SEC — SIGNIFICANT CHANGE UP (ref 9.5–13)
RBC # BLD: 4.04 M/UL — SIGNIFICANT CHANGE UP (ref 3.8–5.2)
RBC # BLD: 4.04 M/UL — SIGNIFICANT CHANGE UP (ref 3.8–5.2)
RBC # FLD: 14.6 % — HIGH (ref 10.3–14.5)
RBC # FLD: 14.6 % — HIGH (ref 10.3–14.5)
SODIUM SERPL-SCNC: 139 MMOL/L — SIGNIFICANT CHANGE UP (ref 135–145)
WBC # BLD: 7.58 K/UL — SIGNIFICANT CHANGE UP (ref 3.8–10.5)
WBC # BLD: 7.58 K/UL — SIGNIFICANT CHANGE UP (ref 3.8–10.5)
WBC # FLD AUTO: 7.58 K/UL — SIGNIFICANT CHANGE UP (ref 3.8–10.5)
WBC # FLD AUTO: 7.58 K/UL — SIGNIFICANT CHANGE UP (ref 3.8–10.5)

## 2023-10-12 PROCEDURE — 85610 PROTHROMBIN TIME: CPT

## 2023-10-12 PROCEDURE — 85730 THROMBOPLASTIN TIME PARTIAL: CPT

## 2023-10-12 PROCEDURE — 80048 BASIC METABOLIC PNL TOTAL CA: CPT

## 2023-10-12 PROCEDURE — 86850 RBC ANTIBODY SCREEN: CPT

## 2023-10-12 PROCEDURE — 86923 COMPATIBILITY TEST ELECTRIC: CPT

## 2023-10-12 PROCEDURE — 86900 BLOOD TYPING SEROLOGIC ABO: CPT

## 2023-10-12 PROCEDURE — 93010 ELECTROCARDIOGRAM REPORT: CPT

## 2023-10-12 PROCEDURE — 85025 COMPLETE CBC W/AUTO DIFF WBC: CPT

## 2023-10-12 PROCEDURE — 86901 BLOOD TYPING SEROLOGIC RH(D): CPT

## 2023-10-12 PROCEDURE — 36415 COLL VENOUS BLD VENIPUNCTURE: CPT

## 2023-10-12 PROCEDURE — 93005 ELECTROCARDIOGRAM TRACING: CPT

## 2023-10-12 NOTE — H&P PST ADULT - NSICDXPASTMEDICALHX_GEN_ALL_CORE_FT
PAST MEDICAL HISTORY:  GERD (gastroesophageal reflux disease)     Hyperlipidemia     Intraductal carcinoma in situ of right breast     Lung nodules     Lymphoma      PAST MEDICAL HISTORY:  GERD (gastroesophageal reflux disease)     Hyperlipidemia     Intraductal carcinoma in situ of right breast     Kyphosis     Lung nodules     Lymphoma     Scoliosis

## 2023-10-12 NOTE — H&P PST ADULT - MUSCULOSKELETAL
details… scoliotic/kyphotic posture/no calf tenderness/strength 5/5 bilateral upper extremities/strength 5/5 bilateral lower extremities/abnormal gait

## 2023-10-12 NOTE — H&P PST ADULT - NSICDXFAMILYHX_GEN_ALL_CORE_FT
FAMILY HISTORY:  FH: breast cancer, 2 first cousins, paternal grandmother    Father  Still living? No  FH: colon cancer, Age at diagnosis: Age Unknown  FH: liver cancer, Age at diagnosis: Age Unknown  FH: type 2 diabetes, Age at diagnosis: Age Unknown    Aunt  Still living? Unknown  FH: colon cancer, Age at diagnosis: Age Unknown

## 2023-10-12 NOTE — H&P PST ADULT - NSICDXPASTSURGICALHX_GEN_ALL_CORE_FT
PAST SURGICAL HISTORY:  H/O lumpectomy     H/O total hysterectomy      PAST SURGICAL HISTORY:  H/O colonoscopy     H/O lumpectomy     H/O lymph node biopsy     H/O total hysterectomy     S/P cataract surgery

## 2023-10-12 NOTE — H&P PST ADULT - ASSESSMENT
78 year old female with PMH of scoliosis, lymphoma, venous insufficiency states lung nodule found incidentally years ago and has been monitored routinely; recent imaging revealed a change in size/shape; patient presents to PST for planned left VATS, robotic assisted, left upper lob wedge resection on 10/19/2023 with Dr. Piper      Plan:  1. PST instructions given ; NPO status/  instructions to be given by ASU   2. Pt instructed to take following meds on day of surgery: none  3. Pt instructed to take routine evening medications unless indicated   4. Stop NSAIDS ( Aspirin Aleve Motrin Mobic Diclofenac), herbal supplements , MVI , Vitamin fish oil 7 days prior to surgery  unless   directed by surgeon or cardiologist;   5. Medical Optimization  with Dr. Aguila- Cardiology  6. EZ wash instructions given   7. Labs EKG as per surgeon request   8. Pt denies covid symptoms shortness of breath fever cough     CAPRINI SCORE [CLOT]    AGE RELATED RISK FACTORS                                                       MOBILITY RELATED FACTORS  [ ] Age 41-60 years                                            (1 Point)                  [ ] Bed rest                                                        (1 Point)  [ ] Age: 61-74 years                                           (2 Points)                 [ ] Plaster cast                                                   (2 Points)  [x ] Age= 75 years                                              (3 Points)                 [ ] Bed bound for more than 72 hours                 (2 Points)    DISEASE RELATED RISK FACTORS                                               GENDER SPECIFIC FACTORS  [ x] Edema in the lower extremities                       (1 Point)                  [ ] Pregnancy                                                     (1 Point)  [ ] Varicose veins                                               (1 Point)                  [ ] Post-partum < 6 weeks                                   (1 Point)             [ ] BMI > 25 Kg/m2                                            (1 Point)                  [ ] Hormonal therapy  or oral contraception          (1 Point)                 [ ] Sepsis (in the previous month)                        (1 Point)                  [ ] History of pregnancy complications                 (1 point)  [ ] Pneumonia or serious lung disease                                               [ ] Unexplained or recurrent                     (1 Point)           (in the previous month)                               (1 Point)  [ ] Abnormal pulmonary function test                     (1 Point)                 SURGERY RELATED RISK FACTORS  [ ] Acute myocardial infarction                              (1 Point)                 [ ]  Section                                             (1 Point)  [ ] Congestive heart failure (in the previous month)  (1 Point)               [ ] Minor surgery                                                  (1 Point)   [ ] Inflammatory bowel disease                             (1 Point)                 [ ] Arthroscopic surgery                                        (2 Points)  [ ] Central venous access                                      (2 Points)                [x ] General surgery lasting more than 45 minutes   (2 Points)       [ ] Stroke (in the previous month)                          (5 Points)               [ ] Elective arthroplasty                                         (5 Points)            ( ) presents and past malignancy                           (2 points)                                                                                                         HEMATOLOGY RELATED FACTORS                                                 TRAUMA RELATED RISK FACTORS  [ ] Prior episodes of VTE                                     (3 Points)                 [ ] Fracture of the hip, pelvis, or leg                       (5 Points)  [ ] Positive family history for VTE                         (3 Points)                 [ ] Acute spinal cord injury (in the previous month)  (5 Points)  [ ] Prothrombin 41136 A                                     (3 Points)                 [ ] Paralysis  (less than 1 month)                             (5 Points)  [ ] Factor V Leiden                                             (3 Points)                  [ ] Multiple Trauma within 1 month                        (5 Points)  [ ] Lupus anticoagulants                                     (3 Points)                                                           [ ] Anticardiolipin antibodies                               (3 Points)                                                       [ ] High homocysteine in the blood                      (3 Points)                                             [ ] Other congenital or acquired thrombophilia      (3 Points)                                                [ ] Heparin induced thrombocytopenia                  (3 Points)                                          Total Score [        6  ]  The Caprini score indicates that this patient is at high risk for a VTE event (score 6 or greater). Surgical patients in this group will benefit from both pharmacologic prophylaxis and intermittent compression devices.  The surgical team will determine the balance between VTE risk and bleeding risk, and other clinical considerations

## 2023-10-12 NOTE — H&P PST ADULT - CARDIOVASCULAR COMMENTS
gets winded if walking up a lot of step or steep hill venous insufficiency discoloration bilateral lower extremities; mild swelling, Lower extremity dopplers performed last week per patient- negative for DVT. Hx of laser vein procedure in the past to right leg.

## 2023-10-12 NOTE — H&P PST ADULT - MS GEN HX ROS MEA POS PC
right knee pain right leg/knee pain- s/p evaluation with Orthopedist and received injection to the knee 10/22/23

## 2023-10-12 NOTE — H&P PST ADULT - HISTORY OF PRESENT ILLNESS
78 year old female with PMH of scoliosis, lymphoma, venous insufficiency states lung nodule found incidentally years ago and has been monitored routinely; recent imaging revealed a change in size/shape; patient presents to [facility] for planned left VATS, robotic assisted, left upper lob wedge resection on 10/19/2023 with Dr. Piper

## 2023-10-13 DIAGNOSIS — Z01.818 ENCOUNTER FOR OTHER PREPROCEDURAL EXAMINATION: ICD-10-CM

## 2023-10-19 ENCOUNTER — APPOINTMENT (OUTPATIENT)
Dept: THORACIC SURGERY | Facility: HOSPITAL | Age: 78
End: 2023-10-19

## 2023-10-19 ENCOUNTER — TRANSCRIPTION ENCOUNTER (OUTPATIENT)
Age: 78
End: 2023-10-19

## 2023-10-19 ENCOUNTER — INPATIENT (INPATIENT)
Facility: HOSPITAL | Age: 78
LOS: 3 days | Discharge: HOME HEALTH | DRG: 164 | End: 2023-10-23
Attending: THORACIC SURGERY (CARDIOTHORACIC VASCULAR SURGERY) | Admitting: THORACIC SURGERY (CARDIOTHORACIC VASCULAR SURGERY)
Payer: MEDICARE

## 2023-10-19 ENCOUNTER — RESULT REVIEW (OUTPATIENT)
Age: 78
End: 2023-10-19

## 2023-10-19 VITALS
HEART RATE: 75 BPM | RESPIRATION RATE: 16 BRPM | SYSTOLIC BLOOD PRESSURE: 142 MMHG | DIASTOLIC BLOOD PRESSURE: 79 MMHG | HEIGHT: 62 IN | WEIGHT: 119.93 LBS | OXYGEN SATURATION: 99 % | TEMPERATURE: 97 F

## 2023-10-19 DIAGNOSIS — Z98.890 OTHER SPECIFIED POSTPROCEDURAL STATES: Chronic | ICD-10-CM

## 2023-10-19 DIAGNOSIS — R91.1 SOLITARY PULMONARY NODULE: ICD-10-CM

## 2023-10-19 DIAGNOSIS — Z98.49 CATARACT EXTRACTION STATUS, UNSPECIFIED EYE: Chronic | ICD-10-CM

## 2023-10-19 DIAGNOSIS — Z90.710 ACQUIRED ABSENCE OF BOTH CERVIX AND UTERUS: Chronic | ICD-10-CM

## 2023-10-19 PROCEDURE — 88360 TUMOR IMMUNOHISTOCHEM/MANUAL: CPT

## 2023-10-19 PROCEDURE — 88360 TUMOR IMMUNOHISTOCHEM/MANUAL: CPT | Mod: 26

## 2023-10-19 PROCEDURE — 36415 COLL VENOUS BLD VENIPUNCTURE: CPT

## 2023-10-19 PROCEDURE — 88307 TISSUE EXAM BY PATHOLOGIST: CPT

## 2023-10-19 PROCEDURE — 32666 THORACOSCOPY W/WEDGE RESECT: CPT | Mod: LT

## 2023-10-19 PROCEDURE — C9399: CPT

## 2023-10-19 PROCEDURE — 97162 PT EVAL MOD COMPLEX 30 MIN: CPT | Mod: GP

## 2023-10-19 PROCEDURE — 88311 DECALCIFY TISSUE: CPT | Mod: 26

## 2023-10-19 PROCEDURE — S2900: CPT

## 2023-10-19 PROCEDURE — 32674 THORACOSCOPY LYMPH NODE EXC: CPT | Mod: 80

## 2023-10-19 PROCEDURE — 85027 COMPLETE CBC AUTOMATED: CPT

## 2023-10-19 PROCEDURE — 97116 GAIT TRAINING THERAPY: CPT | Mod: GP

## 2023-10-19 PROCEDURE — 80048 BASIC METABOLIC PNL TOTAL CA: CPT

## 2023-10-19 PROCEDURE — C9290: CPT

## 2023-10-19 PROCEDURE — 32674 THORACOSCOPY LYMPH NODE EXC: CPT

## 2023-10-19 PROCEDURE — 94640 AIRWAY INHALATION TREATMENT: CPT

## 2023-10-19 PROCEDURE — 83735 ASSAY OF MAGNESIUM: CPT

## 2023-10-19 PROCEDURE — C1729: CPT

## 2023-10-19 PROCEDURE — 32666 THORACOSCOPY W/WEDGE RESECT: CPT | Mod: 80,LT

## 2023-10-19 PROCEDURE — 88311 DECALCIFY TISSUE: CPT

## 2023-10-19 PROCEDURE — C1889: CPT

## 2023-10-19 PROCEDURE — 88305 TISSUE EXAM BY PATHOLOGIST: CPT

## 2023-10-19 PROCEDURE — 71045 X-RAY EXAM CHEST 1 VIEW: CPT

## 2023-10-19 PROCEDURE — 32674 THORACOSCOPY LYMPH NODE EXC: CPT | Mod: AS

## 2023-10-19 PROCEDURE — 88305 TISSUE EXAM BY PATHOLOGIST: CPT | Mod: 26

## 2023-10-19 PROCEDURE — 88307 TISSUE EXAM BY PATHOLOGIST: CPT | Mod: 26

## 2023-10-19 PROCEDURE — S2900 ROBOTIC SURGICAL SYSTEM: CPT | Mod: NC

## 2023-10-19 PROCEDURE — 97530 THERAPEUTIC ACTIVITIES: CPT | Mod: GP

## 2023-10-19 PROCEDURE — 87081 CULTURE SCREEN ONLY: CPT

## 2023-10-19 PROCEDURE — 71045 X-RAY EXAM CHEST 1 VIEW: CPT | Mod: 26

## 2023-10-19 PROCEDURE — 99222 1ST HOSP IP/OBS MODERATE 55: CPT

## 2023-10-19 PROCEDURE — 32666 THORACOSCOPY W/WEDGE RESECT: CPT | Mod: AS,LT

## 2023-10-19 RX ORDER — HEPARIN SODIUM 5000 [USP'U]/ML
5000 INJECTION INTRAVENOUS; SUBCUTANEOUS EVERY 12 HOURS
Refills: 0 | Status: DISCONTINUED | OUTPATIENT
Start: 2023-10-19 | End: 2023-10-23

## 2023-10-19 RX ORDER — FENTANYL CITRATE 50 UG/ML
50 INJECTION INTRAVENOUS
Refills: 0 | Status: DISCONTINUED | OUTPATIENT
Start: 2023-10-19 | End: 2023-10-19

## 2023-10-19 RX ORDER — NALOXONE HYDROCHLORIDE 4 MG/.1ML
0.1 SPRAY NASAL
Refills: 0 | Status: DISCONTINUED | OUTPATIENT
Start: 2023-10-19 | End: 2023-10-23

## 2023-10-19 RX ORDER — ONDANSETRON 8 MG/1
4 TABLET, FILM COATED ORAL EVERY 6 HOURS
Refills: 0 | Status: DISCONTINUED | OUTPATIENT
Start: 2023-10-19 | End: 2023-10-23

## 2023-10-19 RX ORDER — SODIUM CHLORIDE 9 MG/ML
1000 INJECTION, SOLUTION INTRAVENOUS
Refills: 0 | Status: DISCONTINUED | OUTPATIENT
Start: 2023-10-19 | End: 2023-10-19

## 2023-10-19 RX ORDER — UMECLIDINIUM BROMIDE AND VILANTEROL TRIFENATATE 62.5; 25 UG/1; UG/1
1 POWDER RESPIRATORY (INHALATION)
Refills: 0 | DISCHARGE

## 2023-10-19 RX ORDER — DEXTROAMPHETAMINE SACCHARATE, AMPHETAMINE ASPARTATE, DEXTROAMPHETAMINE SULFATE AND AMPHETAMINE SULFATE 1.875; 1.875; 1.875; 1.875 MG/1; MG/1; MG/1; MG/1
0.5 TABLET ORAL
Refills: 0 | DISCHARGE

## 2023-10-19 RX ORDER — ONDANSETRON 8 MG/1
4 TABLET, FILM COATED ORAL ONCE
Refills: 0 | Status: DISCONTINUED | OUTPATIENT
Start: 2023-10-19 | End: 2023-10-19

## 2023-10-19 RX ORDER — ACETAMINOPHEN 500 MG
1000 TABLET ORAL ONCE
Refills: 0 | Status: COMPLETED | OUTPATIENT
Start: 2023-10-19 | End: 2023-10-19

## 2023-10-19 RX ORDER — UMECLIDINIUM BROMIDE AND VILANTEROL TRIFENATATE 62.5; 25 UG/1; UG/1
1 POWDER RESPIRATORY (INHALATION) DAILY
Refills: 0 | Status: DISCONTINUED | OUTPATIENT
Start: 2023-10-19 | End: 2023-10-22

## 2023-10-19 RX ORDER — HYDROMORPHONE HYDROCHLORIDE 2 MG/ML
0.5 INJECTION INTRAMUSCULAR; INTRAVENOUS; SUBCUTANEOUS
Refills: 0 | Status: DISCONTINUED | OUTPATIENT
Start: 2023-10-19 | End: 2023-10-20

## 2023-10-19 RX ORDER — HYDROMORPHONE HYDROCHLORIDE 2 MG/ML
30 INJECTION INTRAMUSCULAR; INTRAVENOUS; SUBCUTANEOUS
Refills: 0 | Status: DISCONTINUED | OUTPATIENT
Start: 2023-10-19 | End: 2023-10-20

## 2023-10-19 RX ORDER — ACETAMINOPHEN 500 MG
1000 TABLET ORAL ONCE
Refills: 0 | Status: COMPLETED | OUTPATIENT
Start: 2023-10-19 | End: 2023-10-20

## 2023-10-19 RX ORDER — ALBUTEROL 90 UG/1
2 AEROSOL, METERED ORAL EVERY 6 HOURS
Refills: 0 | Status: DISCONTINUED | OUTPATIENT
Start: 2023-10-19 | End: 2023-10-23

## 2023-10-19 RX ORDER — HYDROMORPHONE HYDROCHLORIDE 2 MG/ML
0.5 INJECTION INTRAMUSCULAR; INTRAVENOUS; SUBCUTANEOUS
Refills: 0 | Status: DISCONTINUED | OUTPATIENT
Start: 2023-10-19 | End: 2023-10-19

## 2023-10-19 RX ORDER — LIDOCAINE 4 G/100G
1 CREAM TOPICAL EVERY 24 HOURS
Refills: 0 | Status: DISCONTINUED | OUTPATIENT
Start: 2023-10-19 | End: 2023-10-23

## 2023-10-19 RX ORDER — ACETAMINOPHEN 500 MG
1000 TABLET ORAL ONCE
Refills: 0 | Status: DISCONTINUED | OUTPATIENT
Start: 2023-10-19 | End: 2023-10-23

## 2023-10-19 RX ADMIN — HYDROMORPHONE HYDROCHLORIDE 0.5 MILLIGRAM(S): 2 INJECTION INTRAMUSCULAR; INTRAVENOUS; SUBCUTANEOUS at 10:24

## 2023-10-19 RX ADMIN — HYDROMORPHONE HYDROCHLORIDE 0.5 MILLIGRAM(S): 2 INJECTION INTRAMUSCULAR; INTRAVENOUS; SUBCUTANEOUS at 10:39

## 2023-10-19 RX ADMIN — HYDROMORPHONE HYDROCHLORIDE 0.5 MILLIGRAM(S): 2 INJECTION INTRAMUSCULAR; INTRAVENOUS; SUBCUTANEOUS at 10:38

## 2023-10-19 RX ADMIN — HEPARIN SODIUM 5000 UNIT(S): 5000 INJECTION INTRAVENOUS; SUBCUTANEOUS at 18:25

## 2023-10-19 RX ADMIN — Medication 400 MILLIGRAM(S): at 10:38

## 2023-10-19 RX ADMIN — HYDROMORPHONE HYDROCHLORIDE 0.5 MILLIGRAM(S): 2 INJECTION INTRAMUSCULAR; INTRAVENOUS; SUBCUTANEOUS at 10:18

## 2023-10-19 RX ADMIN — HYDROMORPHONE HYDROCHLORIDE 30 MILLILITER(S): 2 INJECTION INTRAMUSCULAR; INTRAVENOUS; SUBCUTANEOUS at 11:01

## 2023-10-19 NOTE — CONSULT NOTE ADULT - SUBJECTIVE AND OBJECTIVE BOX
Patient is a 78y old  Female who presents with a chief complaint of     BRIEF HOSPITAL COURSE: 78 year old female with PMHxof scoliosis, lymphoma, venous insufficiency, incidental lung nodules presents for planned left VATS, robotic assisted, left upper lob wedge resection on 10/19/2023 with Dr. Piper    s/p robot assisted JUSTIN wedge resection   EBL 10cc     10/19     PAST MEDICAL & SURGICAL HISTORY:  Intraductal carcinoma in situ of right breast  Hyperlipidemia  Lymphoma  GERD (gastroesophageal reflux disease)  Lung nodules  Scoiosis  Kyphosis  H/O total hysterectomy  H/O lumpectomy  S/P cataract surgery  H/O lymph node biopsy  H/O colonoscopy    Medications:  albuterol  HYDROmorphone PCA (1 mG/mL) 30 milliLiter(s) PCA Continuous PCA Continuous  HYDROmorphone PCA (1 mG/mL) Rescue Clinician Bolus 0.5 milliGRAM(s) IV Push every 15 minutes PRN  ondansetron Injectable 4 milliGRAM(s) IV Push every 6 hours PRN  heparin   Injectable 5000 Unit(s) SubCutaneous every 12 hours  e Injectable 0.1 milliGRAM(s) IV Push every 3 minutes PRN      ICU Vital Signs Last 24 Hrs  T(C): 36.8 (19 Oct 2023 16:25), Max: 36.8 (19 Oct 2023 16:25)  T(F): 98.2 (19 Oct 2023 16:25), Max: 98.2 (19 Oct 2023 16:25)  HR: 85 (19 Oct 2023 16:00) (74 - 85)  BP: 128/86 (19 Oct 2023 16:00) (96/80 - 148/99)  BP(mean): 96 (19 Oct 2023 16:00) (96 - 113)  ABP: --  ABP(mean): --  RR: 15 (19 Oct 2023 16:00) (12 - 22)  SpO2: 98% (19 Oct 2023 16:00) (98% - 100%)    O2 Parameters below as of 19 Oct 2023 15:28  Patient On (Oxygen Delivery Method): nasal cannula  O2 Flow (L/min): 2      I&O's Detail    19 Oct 2023 07:01  -  19 Oct 2023 16:35  --------------------------------------------------------  IN:    IV PiggyBack: 100 mL    Other (mL): 500 mL  Total IN: 600 mL    OUT:    Voided (mL): 50 mL  Total OUT: 50 mL    Total NET: 550 mL      LABS:        CAPILLARY BLOOD GLUCOSE    CULTURES:    Physical Examination:    General: No acute distress.    HEENT: Pupils equal, reactive to light.  Symmetric.  PULM: Clear to auscultation bilaterally  CVS: Regular rate and rhythm, no murmurs  ABD: Soft, nondistended, nontender  EXT: No edema, nontender  SKIN: Warm and well perfused, no rashes noted.  NEURO: Alert, oriented, interactive    DEVICES:     RADIOLOGY:    Patient is a 78y old  Female who presents with a chief complaint of     BRIEF HOSPITAL COURSE: 78 year old female with PMHxof scoliosis, lymphoma, venous insufficiency, incidental lung nodules presents for planned left VATS, robotic assisted, left upper lob wedge resection on 10/19/2023 with Dr. Piper    s/p robot assisted JUSTIN wedge resection   EBL 10cc     10/19 C/O r sided rib pain d/t chest tube. denies sob, nausea,     PAST MEDICAL & SURGICAL HISTORY:  Intraductal carcinoma in situ of right breast  Hyperlipidemia  Lymphoma  GERD (gastroesophageal reflux disease)  Lung nodules  Scoiosis  Kyphosis  H/O total hysterectomy  H/O lumpectomy  S/P cataract surgery  H/O lymph node biopsy  H/O colonoscopy    Medications:  albuterol  HYDROmorphone PCA (1 mG/mL) 30 milliLiter(s) PCA Continuous PCA Continuous  HYDROmorphone PCA (1 mG/mL) Rescue Clinician Bolus 0.5 milliGRAM(s) IV Push every 15 minutes PRN  ondansetron Injectable 4 milliGRAM(s) IV Push every 6 hours PRN  heparin   Injectable 5000 Unit(s) SubCutaneous every 12 hours  e Injectable 0.1 milliGRAM(s) IV Push every 3 minutes PRN      ICU Vital Signs Last 24 Hrs  T(C): 36.8 (19 Oct 2023 16:25), Max: 36.8 (19 Oct 2023 16:25)  T(F): 98.2 (19 Oct 2023 16:25), Max: 98.2 (19 Oct 2023 16:25)  HR: 85 (19 Oct 2023 16:00) (74 - 85)  BP: 128/86 (19 Oct 2023 16:00) (96/80 - 148/99)  BP(mean): 96 (19 Oct 2023 16:00) (96 - 113)  ABP: --  ABP(mean): --  RR: 15 (19 Oct 2023 16:00) (12 - 22)  SpO2: 98% (19 Oct 2023 16:00) (98% - 100%)    O2 Parameters below as of 19 Oct 2023 15:28  Patient On (Oxygen Delivery Method): nasal cannula  O2 Flow (L/min): 2      I&O's Detail    19 Oct 2023 07:01  -  19 Oct 2023 16:35  --------------------------------------------------------  IN:    IV PiggyBack: 100 mL    Other (mL): 500 mL  Total IN: 600 mL    OUT:    Voided (mL): 50 mL  Total OUT: 50 mL    Total NET: 550 mL      LABS:        CAPILLARY BLOOD GLUCOSE    CULTURES:    Physical Examination:    General: No acute distress.    HEENT: Pupils equal, reactive to light.  Symmetric.  PULM: Clear to auscultation bilaterally  CVS: Regular rate and rhythm, no murmurs  ABD: Soft, nondistended, nontender  EXT: No edema, nontender  SKIN: Warm and well perfused, no rashes noted.  NEURO: Alert, oriented, interactive    DEVICES:     RADIOLOGY:

## 2023-10-19 NOTE — ASU PREOP CHECKLIST - NS PREOP CHK CHLOROHEX WASH
Patient : Adelita Salinas Age: 63 year old Sex: female   MRN: [id number] Encounter Date: 2/28/2021  6A/B06A    History     Chief Complaint   Patient presents with   • Psychiatric Evaluation   • Altered Mental Status     HPI   HPI and ROS limited due to psychosis  2/28/2021  10:24 AM Adelita Salinas is a 63 year old female who presents to the ED via EMS with a police escort for evaluation of worsening behavioral changes over the past couple days. The police notes that they have received several calls regarding the patient's worsening behavior since Friday 2 days ago. They had first been called to her apartment complex after she had began harassing her apartment building neighbors. At that time she was cleared by one of their doctors on site and was not brought to the hospital. Inside her apartment room the police did not find any DM or psychiatric medications and are concerned that she is not taking any of her medications. She also does not have any devices to check her BS. Additionally she has not been eating or drinking as she thinks the food in her room is \"poisoned.\" Yesterday the police was called to a [employer] after the patient was found trespassing in the store around 4AM. The patient was taken off the property but kept returning to the store throughout the day. She believes she owns the [employer] store as well as her apartment building complex. Today police was called again to the patient's apartment after she started become hostile to people. She is now under a chapter 55 hold as police do not believe the patient is able to care for herself. She has not expressed to them any SI or HI. Currently the patient is awake and alert. She is unable to provide any reliable history and has frequent delusional outbursts. She believes she is pregnant with the child of the celebrity \"Radames Webber from [employer].\"     PCP: Luciano Schwab MD      Chart Review: I have reviewed all of the pt's past and ongoing EPIC  records including medications, allergies and medical history. This patient has a hx of schizophrenia and DM. I have also reviewed her care plan.     No Known Allergies    Current Discharge Medication List      Prior to Admission Medications    Details   divalproex (DEPAKOTE ER) 500 MG 24 hr ER tablet Take 1 tablet by mouth 4 times daily (with meals and nightly).  Qty: 120 tablet, Refills: 3      melatonin 3 MG Take 3 tablets by mouth nightly.  Qty: 90 tablet, Refills: 0      mirtazapine (REMERON) 7.5 MG tablet Take 1 tablet by mouth nightly.  Qty: 30 tablet, Refills: 3      nystatin (MYCOSTATIN) 916122 UNIT/GM powder Apply topically 3 times daily.  Qty: 30 g, Refills: 0      loxapine (LOXITANE) 10 MG capsule Take 3 capsules by mouth every 12 hours.  Qty: 180 capsule, Refills: 3      Lancets (FREESTYLE) Misc       Blood Glucose Monitoring Suppl (FREESTYLE LITE) Device USE TO TEST BLOOD SUGAR THREE TIMES A DAY      insulin lispro (HUMALOG) 100 UNIT/ML injectable solution Inject 8 Units into the skin 3 times daily (before meals).      insulin glargine (LANTUS) 100 UNIT/ML vial solution Inject 5 Units into the skin nightly.      metformin (GLUCOPHAGE-XR) 500 MG 24 hr tablet Take 1,000 mg by mouth daily (with breakfast). 2 tabs (= 1,000 mg)             Past Medical History:   Diagnosis Date   • Diabetes mellitus (CMS/HCC)    • Mental disorder     schizophrenia   • Schizophrenia (CMS/HCC)        Past Surgical History:   Procedure Laterality Date   • HIP SURGERY     • LEG SURGERY         Family History   Problem Relation Age of Onset   • Diabetes Mother    • Diabetes Maternal Uncle    • Kidney disease Maternal Uncle        Social History     Tobacco Use   • Smoking status: Never Smoker   • Smokeless tobacco: Never Used   Substance Use Topics   • Alcohol use: No     Frequency: Never     Drinks per session: 1 or 2     Binge frequency: Never   • Drug use: No       E-cigarette/Vaping     E-Cigarette/Vaping Substances & Devices        Review of Systems   Reason unable to perform ROS: psychosis.   Psychiatric/Behavioral: Positive for agitation and behavioral problems.     Physical Exam     ED Triage Vitals   ED Triage Vitals Group      Temp 02/28/21 1024 98.8 °F (37.1 °C)      Heart Rate 02/28/21 1024 74      Resp 02/28/21 1024 18      BP 02/28/21 1024 136/64      SpO2 02/28/21 1024 90 %      EtCO2 mmHg --       Height 02/28/21 1020 5' 4\" (1.626 m)      Weight 02/28/21 1020 183 lb 6.8 oz (83.2 kg)      Weight Scale Used 02/28/21 1020 Scale in bed      BMI (Calculated) 02/28/21 1020 31.48      IBW/kg (Calculated) 02/28/21 1020 54.7       Physical Exam   Constitutional: She is oriented to person, place, and time. She appears well-developed. No distress.   HENT:   Head: Normocephalic.   Mouth/Throat: Oropharynx is clear and moist.   The patient has poor dentition   Eyes: Pupils are equal, round, and reactive to light. Conjunctivae and EOM are normal.   Neck: Normal range of motion. Neck supple.   Cardiovascular: Normal rate, regular rhythm and normal heart sounds.   Pulmonary/Chest: Effort normal and breath sounds normal. No respiratory distress. She has no wheezes.   Abdominal: Soft. Bowel sounds are normal. She exhibits no mass. There is no abdominal tenderness.   Musculoskeletal: Normal range of motion.         General: Edema present.      Comments: There is 1+ RLE edema   The RLE is non tender but slightly larger compared to the LLE   Neurological: She is alert and oriented to person, place, and time. She has normal strength. No cranial nerve deficit or sensory deficit. GCS eye subscore is 4. GCS verbal subscore is 5. GCS motor subscore is 6.   Skin: Skin is warm and dry. No rash noted.   Psychiatric: Thought content is delusional. She expresses impulsivity.   The patient is delusion and frequently has outbursts  Unable to assess for SI   Nursing note and vitals reviewed.    ED Course     Procedures    Lab Results     Results for orders  placed or performed during the hospital encounter of 02/28/21   Comprehensive Metabolic Panel   Result Value Ref Range    Fasting Status      Sodium 139 135 - 145 mmol/L    Potassium 4.1 3.4 - 5.1 mmol/L    Chloride 106 98 - 107 mmol/L    Carbon Dioxide 26 21 - 32 mmol/L    Anion Gap 11 10 - 20 mmol/L    Glucose 158 (H) 65 - 99 mg/dL    BUN 28 (H) 6 - 20 mg/dL    Creatinine 0.55 0.51 - 0.95 mg/dL    Glomerular Filtration Rate >90 >90 mL/min/1.73m2    BUN/ Creatinine Ratio 51 (H) 7 - 25    Calcium 8.8 8.4 - 10.2 mg/dL    Bilirubin, Total 0.2 0.2 - 1.0 mg/dL    GOT/AST 23 <=37 Units/L    GPT/ALT 20 <64 Units/L    Alkaline Phosphatase 73 45 - 117 Units/L    Albumin 3.4 (L) 3.6 - 5.1 g/dL    Protein, Total 6.5 6.4 - 8.2 g/dL    Globulin 3.1 2.0 - 4.0 g/dL    A/G Ratio 1.1 1.0 - 2.4   NT proBNP   Result Value Ref Range    NT-proBNP 149 (H) <=125 pg/mL   Urinalysis & Reflex Microscopy With Culture If Indicated   Result Value Ref Range    COLOR, URINALYSIS Straw     APPEARANCE, URINALYSIS Clear     GLUCOSE, URINALYSIS Trace (A) Negative mg/dL    BILIRUBIN, URINALYSIS Negative Negative    KETONES, URINALYSIS Negative Negative mg/dL    SPECIFIC GRAVITY, URINALYSIS 1.015 1.005 - 1.030    OCCULT BLOOD, URINALYSIS Negative Negative    PH, URINALYSIS 5.0 5.0 - 7.0    PROTEIN, URINALYSIS Negative Negative mg/dL    UROBILINOGEN, URINALYSIS 0.2 0.2, 1.0 mg/dL    NITRITE, URINALYSIS Negative Negative    LEUKOCYTE ESTERASE, URINALYSIS Negative Negative   Drug Abuse Screen, Urine   Result Value Ref Range    Amphetamines, Urine Negative Negative    Barbiturates, Urine Negative Negative    Benzodiazepines, Urine Negative Negative    Cocaine/ Metabolite, Urine Negative Negative    Opiates, Urine Negative Negative    Phencyclidine, Urine Negative Negative    Cannabinoids, Urine Negative Negative   Alcohol   Result Value Ref Range    Alcohol None Detected None Detected mg/dL   Acetaminophen Level   Result Value Ref Range    Acetaminophen  <2 (L) 10 - 30 mcg/mL   Salicylate Level   Result Value Ref Range    Salicylate <2.8 <=30.0 mg/dL   CBC with Automated Differential (performable only)   Result Value Ref Range    WBC 9.5 4.2 - 11.0 K/mcL    RBC 3.39 (L) 4.00 - 5.20 mil/mcL    HGB 10.6 (L) 12.0 - 15.5 g/dL    HCT 32.7 (L) 36.0 - 46.5 %    MCV 96.5 78.0 - 100.0 fl    MCH 31.3 26.0 - 34.0 pg    MCHC 32.4 32.0 - 36.5 g/dL    RDW-CV 13.5 11.0 - 15.0 %    RDW-SD 47.8 39.0 - 50.0 fL     140 - 450 K/mcL    NRBC 0 <=0 /100 WBC    Neutrophil, Percent 72 %    Lymphocytes, Percent 18 %    Mono, Percent 8 %    Eosinophils, Percent 1 %    Basophils, Percent 0 %    Immature Granulocytes 1 %    Absolute Neutrophils 6.9 1.8 - 7.7 K/mcL    Absolute Lymphocytes 1.7 1.0 - 4.0 K/mcL    Absolute Monocytes 0.7 0.3 - 0.9 K/mcL    Absolute Eosinophils  0.1 0.0 - 0.5 K/mcL    Absolute Basophils 0.0 0.0 - 0.3 K/mcL    Absolute Immmature Granulocytes 0.1 0.0 - 0.2 K/mcL   Valproic Acid   Result Value Ref Range    Valproate <3 (L) 50 - 125 mcg/mL   ISTAT8 VENOUS  POINT OF CARE   Result Value Ref Range    BUN - POINT OF CARE 37 (H) 6 - 20 mg/dL    SODIUM - POINT OF CARE 139 135 - 145 mmol/L    POTASSIUM - POINT OF CARE 4.5 3.4 - 5.1 mmol/L    CHLORIDE - POINT OF CARE 107 98 - 107 mmol/L    TCO2 - POINT OF CARE 25 (H) 19 - 24 mmol/L    ANION GAP - POINT OF CARE 12 10 - 20 mmol/L    HEMATOCRIT - POINT OF CARE 33.0 (L) 36.0 - 46.5 %    HEMOGLOBIN - POINT OF CARE 11.2 (L) 12.0 - 15.5 g/dL    GLUCOSE - POINT OF CARE 167 (H) 70 - 99 mg/dL    CALCIUM, IONIZED - POINT OF CARE 1.17 1.15 - 1.29 mmol/L    Creatinine 0.50 (L) 0.51 - 0.95 mg/dL    Glomerular Filtration Rate >90 >90 mL/min/1.73m2   TROPONIN I  POINT OF CARE   Result Value Ref Range    TROPONIN I - POINT OF CARE <0.10 <0.10 ng/mL       EKG Results     EKG Interpretation  Rate: 70  Rhythm: normal sinus rhythm   Abnormality: When compared with ECG from 9-, criteria for inferior infarct are no longer present.    EKG  tracing interpreted by ED physician    Radiology Results     Imaging Results          US Lower Extremity Venous Duplex Right (Final result)  Result time 02/28/21 13:57:13    Final result                 Impression:    IMPRESSION:    No ultrasound evidence of deep venous thrombosis in the right lower  extremity.             Narrative:    EXAM: US LOWER EXTREMITY VENOUS DUPLEX RIGHT     HISTORY: swelling    COMPARISON: None.     TECHNIQUE: Grayscale, color Doppler, and spectral duplex images of the  right lower extremity venous system and the contralateral common femoral  vein.    FINDINGS:  The study is limited due to leg edema and patient motion   The visualized deep venous structures of the right lower extremity, from  the level of the popliteal vein cephalad to the common femoral/greater  saphenous vein junction, demonstrate normal compressibility, augmentation,  and color and pulsed Doppler flow without intraluminal thrombus.     The right posterior tibialis and peroneal veins were patent on color  Doppler and grayscale imaging.     The contralateral common femoral vein is patent.                                  ED Medication Orders (From admission, onward)    Ordered Start     Status Ordering Provider    02/28/21 1031 02/28/21 1040  OLANZapine (ZyPREXA) tablet 10 mg  ONCE      Last MAR action: Given CORY SOLANO  Vitals  Vitals:    02/28/21 1020 02/28/21 1024 02/28/21 1240   BP:  136/64 116/59   BP Location:   LUE - Left upper extremity   Patient Position:   Semi-Jasso's   Pulse:  74 72   Resp:  18 18   Temp:  98.8 °F (37.1 °C)    TempSrc:  Oral    SpO2:  90% 99%   Weight: 83.2 kg     Height: 5' 4\" (1.626 m)         ED Course  Initial plan HPI obtained. Patient evaluated at bedside in room. Vitals reviewed. Will plan for labs, UA, ECG and US imaging of the RLE. The patient is however declining blood work or IV. She is agreeable to a dose of ASA.      10:43 AM: The nurse informed me that  the patient is requesting for ice. Will have the nurse offer the patient ice but only if she is agreeable to blood work in return.    11:58 AM Recheck: The nurse informed me that they were able to obtain blood work from the patient. The patient is however unable to provide a urine sample. She had stated to the nurse that she has burning when she urinates. The nurse did try to assist the patient in obtaining a urine sample and noticed that there was a scant amount of scant red blood when she wiped. Currently the patient is alert and conversational with the police at bedside. He was informed that the pt will need to be medically hospitalized to be deemed incompetent. All questions were addressed.    12:06 PM: Swapna from behavioral health informed me that their services do not get involved with chapter 55 cases. However the medical team and psychiatrist do and will be able to evaluate the patient beyond the ED.    12:47 PM: The nurse informed me that the patient refused a pelvic exam. She was agreeable to have a ECG done.    1:33 PM [facility] patient's presentation and ED work up. I made them aware of all ED findings, clinical impression, and treatment plan. We reviewed the pt's labs and imaging results. The patient is delusional. She has not been eating, drinking or any of taking her medications. The police have the patient on a chapter 55 hold. Patient has gradually allowed for blood work during her ED stay. She is afebrile. WBC is within normal range. Her UA shows no evidence for infection. Given that the patient is under a chapter 55 hold she will need to be hospitalized to be deemed incompetent as she is still documented as her own medical decision maker.    2:01 PM: I updated the patient on the plan of care. She is agreeable with the plan and gave verbal consent for me to contact her son and disclose her medical information.     2:06 PM: I called the patient's son but was unable to  reach him. I left a voice message for the patient to call back regarding the patient.    2:20 PM: The patient's son called back and was updated on the patient's ED work up and the plan of care. She is placed under a chapter 55 hold and will be hospitalized to be seen by psychiatry. I discussed that they will determine if she is competent of making her own medical decisions and live on her own. The son voiced understanding and agreed with the plan. All questions were addressed. He informed me that the patient is capable of living independently and can cook and clean for herself when she is taking her medication appropriately. He notes that her presentation today is very common for her when she stops taking her medications.    MDM  Patient is a 63-year-old female presents emergency department with police as a chapter 55.  Police have been called her house multiple times for complaints from the neighbors and a complaint from [facility].  Patient has a history of schizophrenia and has not been taking her medications.  She is delusional.  She has paranoid delusions that she is being poisoned and also delusions that she is pregnant.  Her medical workup is unremarkable with mild hyperglycemia.  There is no evidence of acute infection.  Right lower extremity is swollen but there is no evidence of DVT.  Her son was contacted and states that when she is on her medications she has acts appropriate and is able take care of herself.  At this time she is not able to take care of herself because of her delusions.  She will be admitted to the hospital and will need a psychiatric evaluation to evaluate her competency.    Critical Care time spent on this patient outside of billable procedures:  None    Does this patient meet Severe Sepsis criteria by CMS SEP-1 definition?No     Does this patient meet Septic Shock criteria by CMS SEP-1 definition?No      Clinical Impression:  ED Diagnoses        Final diagnoses    Delusional disorder  (CMS/HCC)          Hyperglycemia          Paranoid delusion (CMS/HCC)                  Pt to be admitted to Dr. Nelson in fair condition.       I have reviewed the information recorded by the scribe for accuracy and agree with its contents.    ________________________________________________________________  __    Case Javed acting as the scribe for Dr. Huber Bajwa  IDX # [id number]  Scribe: Case Bajwa MD  02/28/21 1542     at home:

## 2023-10-19 NOTE — BRIEF OPERATIVE NOTE - NSICDXBRIEFPROCEDURE_GEN_ALL_CORE_FT
PROCEDURES:  Robot-assisted wedge resection of lung 19-Oct-2023 10:12:04 Left Upper Lobe wedge resection Sukhwinder Issa

## 2023-10-19 NOTE — CONSULT NOTE ADULT - NS ATTEND AMEND GEN_ALL_CORE FT
78 year old female with PMHxof scoliosis, lymphoma, venous insufficiency, incidental lung nodules presents for planned left VATS, robotic assisted, left upper lob wedge resection on 10/19/2023 with Dr. Piper    s/p robot assisted JUSTIN wedge resection   EBL 10cc       Plan:  SICU  Pain control  CXR in AM  BPDs  SQH DVT Prophylaxis

## 2023-10-19 NOTE — CONSULT NOTE ADULT - ASSESSMENT
ASSESSMENT  78 year old female with PMHxof scoliosis, lymphoma, venous insufficiency, incidental lung nodules presents for planned left VATS, robotic assisted, left upper lob wedge resection on 10/19/2023 with Dr. Piper    s/p robot assisted JUSTIN wedge resection   EBL 10cc     PLAN  Neuro: pain control prn dilaudid pca.  CV: Normotensive. cont to monitor   Pulm: on 2L sating 100%, wean to maintain O2 sat > 94%. on anoro ellipta at home. cont albuterol prn  GI: Po diet . bowel regimen prn  Nephro: monitor I & Os. Trend renal fxn  Heme: DVT ppx - hep sq. SCDs  PT eval    Dispo: SICU. pain control. incentive spirometer. f/u surgical pathology    Will discuss with Dr. Landry ASSESSMENT  78 year old female with PMHxof scoliosis, lymphoma, venous insufficiency, incidental lung nodules presents for planned left VATS, robotic assisted, left upper lob wedge resection on 10/19/2023 with Dr. Piper    s/p robot assisted JUSTIN wedge resection   EBL 10cc     PLAN  Neuro: pain control prn dilaudid pca.  CV: Normotensive. cont to monitor   Pulm: on 2L sating 100%, wean to maintain O2 sat > 94%. on anoro ellipta at home, cont here  GI: Po diet . bowel regimen prn  Nephro: monitor I & Os. Trend renal fxn  Heme: DVT ppx - hep sq. SCDs  PT eval  updated son at bedside    Dispo: SICU. pain control. incentive spirometer. f/u surgical pathology    Will discuss with Dr. Landry

## 2023-10-19 NOTE — ASU PREOP CHECKLIST - HEIGHT IN CM
[facility]. [phone]  Monday  8 am - 7pm  Tues - Fri 8 am - 5:30 pm          157.48

## 2023-10-20 ENCOUNTER — TRANSCRIPTION ENCOUNTER (OUTPATIENT)
Age: 78
End: 2023-10-20

## 2023-10-20 LAB
ANION GAP SERPL CALC-SCNC: 3 MMOL/L — LOW (ref 5–17)
ANION GAP SERPL CALC-SCNC: 3 MMOL/L — LOW (ref 5–17)
BUN SERPL-MCNC: 16 MG/DL — SIGNIFICANT CHANGE UP (ref 7–23)
BUN SERPL-MCNC: 16 MG/DL — SIGNIFICANT CHANGE UP (ref 7–23)
CALCIUM SERPL-MCNC: 8.6 MG/DL — SIGNIFICANT CHANGE UP (ref 8.5–10.1)
CALCIUM SERPL-MCNC: 8.6 MG/DL — SIGNIFICANT CHANGE UP (ref 8.5–10.1)
CHLORIDE SERPL-SCNC: 106 MMOL/L — SIGNIFICANT CHANGE UP (ref 96–108)
CHLORIDE SERPL-SCNC: 106 MMOL/L — SIGNIFICANT CHANGE UP (ref 96–108)
CO2 SERPL-SCNC: 30 MMOL/L — SIGNIFICANT CHANGE UP (ref 22–31)
CO2 SERPL-SCNC: 30 MMOL/L — SIGNIFICANT CHANGE UP (ref 22–31)
CREAT SERPL-MCNC: 0.46 MG/DL — LOW (ref 0.5–1.3)
CREAT SERPL-MCNC: 0.46 MG/DL — LOW (ref 0.5–1.3)
EGFR: 98 ML/MIN/1.73M2 — SIGNIFICANT CHANGE UP
EGFR: 98 ML/MIN/1.73M2 — SIGNIFICANT CHANGE UP
GLUCOSE SERPL-MCNC: 106 MG/DL — HIGH (ref 70–99)
GLUCOSE SERPL-MCNC: 106 MG/DL — HIGH (ref 70–99)
HCT VFR BLD CALC: 36 % — SIGNIFICANT CHANGE UP (ref 34.5–45)
HCT VFR BLD CALC: 36 % — SIGNIFICANT CHANGE UP (ref 34.5–45)
HGB BLD-MCNC: 12 G/DL — SIGNIFICANT CHANGE UP (ref 11.5–15.5)
HGB BLD-MCNC: 12 G/DL — SIGNIFICANT CHANGE UP (ref 11.5–15.5)
MAGNESIUM SERPL-MCNC: 2.3 MG/DL — SIGNIFICANT CHANGE UP (ref 1.6–2.6)
MAGNESIUM SERPL-MCNC: 2.3 MG/DL — SIGNIFICANT CHANGE UP (ref 1.6–2.6)
MCHC RBC-ENTMCNC: 32.5 PG — SIGNIFICANT CHANGE UP (ref 27–34)
MCHC RBC-ENTMCNC: 32.5 PG — SIGNIFICANT CHANGE UP (ref 27–34)
MCHC RBC-ENTMCNC: 33.3 GM/DL — SIGNIFICANT CHANGE UP (ref 32–36)
MCHC RBC-ENTMCNC: 33.3 GM/DL — SIGNIFICANT CHANGE UP (ref 32–36)
MCV RBC AUTO: 97.6 FL — SIGNIFICANT CHANGE UP (ref 80–100)
MCV RBC AUTO: 97.6 FL — SIGNIFICANT CHANGE UP (ref 80–100)
PLATELET # BLD AUTO: 277 K/UL — SIGNIFICANT CHANGE UP (ref 150–400)
PLATELET # BLD AUTO: 277 K/UL — SIGNIFICANT CHANGE UP (ref 150–400)
POTASSIUM SERPL-MCNC: 4.3 MMOL/L — SIGNIFICANT CHANGE UP (ref 3.5–5.3)
POTASSIUM SERPL-MCNC: 4.3 MMOL/L — SIGNIFICANT CHANGE UP (ref 3.5–5.3)
POTASSIUM SERPL-SCNC: 4.3 MMOL/L — SIGNIFICANT CHANGE UP (ref 3.5–5.3)
POTASSIUM SERPL-SCNC: 4.3 MMOL/L — SIGNIFICANT CHANGE UP (ref 3.5–5.3)
RBC # BLD: 3.69 M/UL — LOW (ref 3.8–5.2)
RBC # BLD: 3.69 M/UL — LOW (ref 3.8–5.2)
RBC # FLD: 15.3 % — HIGH (ref 10.3–14.5)
RBC # FLD: 15.3 % — HIGH (ref 10.3–14.5)
SODIUM SERPL-SCNC: 139 MMOL/L — SIGNIFICANT CHANGE UP (ref 135–145)
SODIUM SERPL-SCNC: 139 MMOL/L — SIGNIFICANT CHANGE UP (ref 135–145)
WBC # BLD: 7.79 K/UL — SIGNIFICANT CHANGE UP (ref 3.8–10.5)
WBC # BLD: 7.79 K/UL — SIGNIFICANT CHANGE UP (ref 3.8–10.5)
WBC # FLD AUTO: 7.79 K/UL — SIGNIFICANT CHANGE UP (ref 3.8–10.5)
WBC # FLD AUTO: 7.79 K/UL — SIGNIFICANT CHANGE UP (ref 3.8–10.5)

## 2023-10-20 PROCEDURE — 99232 SBSQ HOSP IP/OBS MODERATE 35: CPT

## 2023-10-20 PROCEDURE — 71045 X-RAY EXAM CHEST 1 VIEW: CPT | Mod: 26

## 2023-10-20 RX ORDER — OXYCODONE HYDROCHLORIDE 5 MG/1
10 TABLET ORAL EVERY 4 HOURS
Refills: 0 | Status: DISCONTINUED | OUTPATIENT
Start: 2023-10-20 | End: 2023-10-23

## 2023-10-20 RX ORDER — SODIUM CHLORIDE 9 MG/ML
1000 INJECTION INTRAMUSCULAR; INTRAVENOUS; SUBCUTANEOUS
Refills: 0 | Status: DISCONTINUED | OUTPATIENT
Start: 2023-10-20 | End: 2023-10-23

## 2023-10-20 RX ORDER — OXYCODONE HYDROCHLORIDE 5 MG/1
1 TABLET ORAL
Qty: 28 | Refills: 0
Start: 2023-10-20

## 2023-10-20 RX ORDER — OXYCODONE HYDROCHLORIDE 5 MG/1
5 TABLET ORAL EVERY 4 HOURS
Refills: 0 | Status: DISCONTINUED | OUTPATIENT
Start: 2023-10-20 | End: 2023-10-23

## 2023-10-20 RX ADMIN — Medication 400 MILLIGRAM(S): at 21:51

## 2023-10-20 RX ADMIN — OXYCODONE HYDROCHLORIDE 10 MILLIGRAM(S): 5 TABLET ORAL at 19:04

## 2023-10-20 RX ADMIN — HEPARIN SODIUM 5000 UNIT(S): 5000 INJECTION INTRAVENOUS; SUBCUTANEOUS at 10:23

## 2023-10-20 RX ADMIN — Medication 1000 MILLIGRAM(S): at 20:23

## 2023-10-20 RX ADMIN — LIDOCAINE 1 PATCH: 4 CREAM TOPICAL at 10:24

## 2023-10-20 RX ADMIN — HEPARIN SODIUM 5000 UNIT(S): 5000 INJECTION INTRAVENOUS; SUBCUTANEOUS at 21:54

## 2023-10-20 RX ADMIN — UMECLIDINIUM BROMIDE AND VILANTEROL TRIFENATATE 1 PUFF(S): 62.5; 25 POWDER RESPIRATORY (INHALATION) at 08:46

## 2023-10-20 NOTE — DISCHARGE NOTE PROVIDER - CARE PROVIDER_API CALL
Taiwo Piper  [address] [place] [address]  Phone: [phone]  Fax: (   )    -  Follow Up Time:

## 2023-10-20 NOTE — DISCHARGE NOTE PROVIDER - NSDCMRMEDTOKEN_GEN_ALL_CORE_FT
Adderall 30 mg oral tablet: 0.5 tab(s) orally once a day  Anoro Ellipta 62.5 mcg-25 mcg/inh inhalation powder: 1 puff(s) inhaled once a day  oxyCODONE 5 mg oral tablet: 1 tab(s) orally every 6 hours as needed for Moderate Pain (4 - 6) MDD: 4

## 2023-10-20 NOTE — DISCHARGE NOTE PROVIDER - NSDCCPTREATMENT_GEN_ALL_CORE_FT
PRINCIPAL PROCEDURE  Procedure: Robot-assisted wedge resection of lung  Findings and Treatment: Left Upper Lobe wedge resection

## 2023-10-20 NOTE — DISCHARGE NOTE PROVIDER - NSDCFUADDAPPT_GEN_ALL_CORE_FT
Leave dressing intact until tomorrow evening, reinforcing with tape if necessary (about 36 hours from chest tube removal). At that time you may remove the dressing and take a shower. Place clean gauze over wound if continual drainage. Call Dr. Piper's office at [phone] tomorrow or the next business day to make a followup appointment. Call the office if you experience any fevers, shortness of breath, chest pain or excessive drainage from the incision, day or night. Go to the emergency room if any of these symptoms are severe. Take your medications as ordered and take a stool softener if needed with the narcotic medications.     You may take the dressings off Sunday and take a shower  No Ointments creams lotions to wounds

## 2023-10-20 NOTE — DISCHARGE NOTE PROVIDER - HOSPITAL COURSE
78 year old female with PMH of scoliosis, lymphoma, venous insufficiency states lung nodule found incidentally years ago and has been monitored routinely; recent imaging revealed a change in size/shape; s/p 10/19 left VATS, robotic assisted, left upper lob wedge resection

## 2023-10-20 NOTE — DISCHARGE NOTE PROVIDER - PROVIDER TOKENS
FREE:[LAST:[Feliz],FIRST:[Taiwo],PHONE:[phone]],FAX:[(   [phone]    -],ADDRESS:[[address] [place] [address] Renita  Lawrence]]

## 2023-10-20 NOTE — DISCHARGE NOTE PROVIDER - NSDCPNSUBOBJ_GEN_ALL_CORE
Subjective:  Patient seen and examined this morning.  No acute issues overnight and patient offered no complaints.    Vital Signs:  Vital Signs Last 24 Hrs  T(C): 36.4 (10-23-23 @ 09:14), Max: 36.9 (10-22-23 @ 14:22)  T(F): 97.5 (10-23-23 @ 09:14), Max: 98.5 (10-22-23 @ 14:22)  HR: 93 (10-23-23 @ 10:00) (74 - 103)  BP: 127/64 (10-23-23 @ 10:00) (103/54 - 130/72)  RR: 17 (10-23-23 @ 10:00) (16 - 25)  SpO2: 100% (10-23-23 @ 08:00) (94% - 100%) on (O2)    Telemetry/Alarms:    Relevant labs, radiology and Medications reviewed      MEDICATIONS  (STANDING):  acetaminophen   IVPB .. 1000 milliGRAM(s) IV Intermittent once  heparin   Injectable 5000 Unit(s) SubCutaneous every 12 hours  lidocaine   4% Patch 1 Patch Transdermal every 24 hours  sodium chloride 0.9%. 1000 milliLiter(s) (30 mL/Hr) IV Continuous <Continuous>  tiotropium 2.5 MICROgram(s) Inhaler 2 Puff(s) Inhalation daily    MEDICATIONS  (PRN):  albuterol    90 MICROgram(s) HFA Inhaler 2 Puff(s) Inhalation every 6 hours PRN Shortness of Breath and/or Wheezing  bisacodyl 5 milliGRAM(s) Oral every 12 hours PRN Constipation  naloxone Injectable 0.1 milliGRAM(s) IV Push every 3 minutes PRN For ANY of the following changes in patient status:  A. RR LESS THAN 10 breaths per minute, B. Oxygen saturation LESS THAN 90%, C. Sedation score of 6  ondansetron Injectable 4 milliGRAM(s) IV Push every 6 hours PRN Nausea  oxyCODONE    IR 5 milliGRAM(s) Oral every 4 hours PRN Moderate Pain (4 - 6)  oxyCODONE    IR 10 milliGRAM(s) Oral every 4 hours PRN Severe Pain (7 - 10)      Physical exam  Gen: In NAD, sitting up in bed  Neuro: A, O x 3  Card: S1S2, RRR  Pulm: Dim BS bilateral bases  Left VATS incisions cdi; CT sutures in place, no erythema noted  Abd: +BS, soft, NT, ND  Ext: warm, no edema    I&O's Summary    22 Oct 2023 07:01  -  23 Oct 2023 07:00  --------------------------------------------------------  IN: 0 mL / OUT: 700 mL / NET: -700 mL      Assessment  78 year old female with PMH of scoliosis, lymphoma, venous insufficiency with lung nodule found incidentally years ago and has been monitored routinely; recent imaging revealed a change in size/shape; s/p 10/19 left VATS, robotic assisted, left upper lob wedge resection.  CT removed without incident.    PLAN  - dc home today with walker (ordered)  - Pain management as prescribed  -Continue incentive spirometer  - f/u Dr Piper as outpatient    Discussed with Cardiothoracic Team at AM rounds.

## 2023-10-21 PROCEDURE — 99232 SBSQ HOSP IP/OBS MODERATE 35: CPT

## 2023-10-21 RX ORDER — HYDROMORPHONE HYDROCHLORIDE 2 MG/ML
0.5 INJECTION INTRAMUSCULAR; INTRAVENOUS; SUBCUTANEOUS ONCE
Refills: 0 | Status: DISCONTINUED | OUTPATIENT
Start: 2023-10-21 | End: 2023-10-21

## 2023-10-21 RX ORDER — HYDROMORPHONE HYDROCHLORIDE 2 MG/ML
0.25 INJECTION INTRAMUSCULAR; INTRAVENOUS; SUBCUTANEOUS ONCE
Refills: 0 | Status: DISCONTINUED | OUTPATIENT
Start: 2023-10-21 | End: 2023-10-21

## 2023-10-21 RX ADMIN — OXYCODONE HYDROCHLORIDE 10 MILLIGRAM(S): 5 TABLET ORAL at 15:40

## 2023-10-21 RX ADMIN — OXYCODONE HYDROCHLORIDE 5 MILLIGRAM(S): 5 TABLET ORAL at 05:41

## 2023-10-21 RX ADMIN — OXYCODONE HYDROCHLORIDE 10 MILLIGRAM(S): 5 TABLET ORAL at 10:37

## 2023-10-21 RX ADMIN — HEPARIN SODIUM 5000 UNIT(S): 5000 INJECTION INTRAVENOUS; SUBCUTANEOUS at 06:39

## 2023-10-21 RX ADMIN — OXYCODONE HYDROCHLORIDE 10 MILLIGRAM(S): 5 TABLET ORAL at 01:44

## 2023-10-21 RX ADMIN — HEPARIN SODIUM 5000 UNIT(S): 5000 INJECTION INTRAVENOUS; SUBCUTANEOUS at 17:30

## 2023-10-21 RX ADMIN — UMECLIDINIUM BROMIDE AND VILANTEROL TRIFENATATE 1 PUFF(S): 62.5; 25 POWDER RESPIRATORY (INHALATION) at 08:32

## 2023-10-21 RX ADMIN — HYDROMORPHONE HYDROCHLORIDE 0.25 MILLIGRAM(S): 2 INJECTION INTRAMUSCULAR; INTRAVENOUS; SUBCUTANEOUS at 02:42

## 2023-10-21 RX ADMIN — HYDROMORPHONE HYDROCHLORIDE 0.25 MILLIGRAM(S): 2 INJECTION INTRAMUSCULAR; INTRAVENOUS; SUBCUTANEOUS at 04:03

## 2023-10-21 RX ADMIN — HYDROMORPHONE HYDROCHLORIDE 0.5 MILLIGRAM(S): 2 INJECTION INTRAMUSCULAR; INTRAVENOUS; SUBCUTANEOUS at 08:42

## 2023-10-21 RX ADMIN — LIDOCAINE 1 PATCH: 4 CREAM TOPICAL at 11:05

## 2023-10-21 RX ADMIN — HYDROMORPHONE HYDROCHLORIDE 0.5 MILLIGRAM(S): 2 INJECTION INTRAMUSCULAR; INTRAVENOUS; SUBCUTANEOUS at 09:00

## 2023-10-21 RX ADMIN — OXYCODONE HYDROCHLORIDE 10 MILLIGRAM(S): 5 TABLET ORAL at 11:30

## 2023-10-21 RX ADMIN — OXYCODONE HYDROCHLORIDE 10 MILLIGRAM(S): 5 TABLET ORAL at 14:54

## 2023-10-21 RX ADMIN — OXYCODONE HYDROCHLORIDE 10 MILLIGRAM(S): 5 TABLET ORAL at 02:21

## 2023-10-21 RX ADMIN — OXYCODONE HYDROCHLORIDE 10 MILLIGRAM(S): 5 TABLET ORAL at 21:21

## 2023-10-21 NOTE — PROGRESS NOTE ADULT - NS ATTEND AMEND GEN_ALL_CORE FT
78 year old female with PMHxof scoliosis, lymphoma, venous insufficiency, incidental lung nodules presents for planned left VATS, robotic assisted, left upper lob wedge resection on 10/19/2023 with Dr. Piper    s/p robot assisted JUSTIN wedge resection       Plan:  SICU  Continue Chest tube  Pain control  Po Diet  SQH DVT Prophylaxis  PT
78 year old female with PMHxof scoliosis, lymphoma, venous insufficiency, incidental lung nodules presents for planned left VATS, robotic assisted, left upper lob wedge resection on 10/19/2023 with Dr. Piper    s/p robot assisted JUSTIN wedge resection       Plan:  SICU  Continue Chest tube  Pain control  Po Diet  SQH DVT Prophylaxis  PT

## 2023-10-21 NOTE — PROGRESS NOTE ADULT - MOUTH
moist Modified Advancement Flap Text: The defect edges were debeveled with a #15 scalpel blade.  Given the location of the defect, shape of the defect and the proximity to free margins a modified advancement flap was deemed most appropriate.  Using a sterile surgical marker, an appropriate advancement flap was drawn incorporating the defect and placing the expected incisions within the relaxed skin tension lines where possible.    The area thus outlined was incised deep to adipose tissue with a #15 scalpel blade.  The skin margins were undermined to an appropriate distance in all directions utilizing iris scissors.

## 2023-10-22 PROCEDURE — 71045 X-RAY EXAM CHEST 1 VIEW: CPT | Mod: 26

## 2023-10-22 RX ORDER — TIOTROPIUM BROMIDE 18 UG/1
2 CAPSULE ORAL; RESPIRATORY (INHALATION) DAILY
Refills: 0 | Status: DISCONTINUED | OUTPATIENT
Start: 2023-10-22 | End: 2023-10-23

## 2023-10-22 RX ORDER — POLYETHYLENE GLYCOL 3350 17 G/17G
17 POWDER, FOR SOLUTION ORAL ONCE
Refills: 0 | Status: COMPLETED | OUTPATIENT
Start: 2023-10-22 | End: 2023-10-22

## 2023-10-22 RX ADMIN — LIDOCAINE 1 PATCH: 4 CREAM TOPICAL at 22:17

## 2023-10-22 RX ADMIN — HEPARIN SODIUM 5000 UNIT(S): 5000 INJECTION INTRAVENOUS; SUBCUTANEOUS at 18:25

## 2023-10-22 RX ADMIN — OXYCODONE HYDROCHLORIDE 10 MILLIGRAM(S): 5 TABLET ORAL at 22:00

## 2023-10-22 RX ADMIN — OXYCODONE HYDROCHLORIDE 10 MILLIGRAM(S): 5 TABLET ORAL at 02:36

## 2023-10-22 RX ADMIN — POLYETHYLENE GLYCOL 3350 17 GRAM(S): 17 POWDER, FOR SOLUTION ORAL at 09:55

## 2023-10-22 RX ADMIN — OXYCODONE HYDROCHLORIDE 10 MILLIGRAM(S): 5 TABLET ORAL at 20:59

## 2023-10-22 RX ADMIN — TIOTROPIUM BROMIDE 2 PUFF(S): 18 CAPSULE ORAL; RESPIRATORY (INHALATION) at 09:03

## 2023-10-22 RX ADMIN — OXYCODONE HYDROCHLORIDE 10 MILLIGRAM(S): 5 TABLET ORAL at 10:04

## 2023-10-22 RX ADMIN — OXYCODONE HYDROCHLORIDE 10 MILLIGRAM(S): 5 TABLET ORAL at 03:54

## 2023-10-22 RX ADMIN — HEPARIN SODIUM 5000 UNIT(S): 5000 INJECTION INTRAVENOUS; SUBCUTANEOUS at 06:30

## 2023-10-22 RX ADMIN — Medication 5 MILLIGRAM(S): at 20:59

## 2023-10-22 RX ADMIN — OXYCODONE HYDROCHLORIDE 10 MILLIGRAM(S): 5 TABLET ORAL at 10:34

## 2023-10-22 RX ADMIN — LIDOCAINE 1 PATCH: 4 CREAM TOPICAL at 10:04

## 2023-10-22 RX ADMIN — LIDOCAINE 1 PATCH: 4 CREAM TOPICAL at 19:21

## 2023-10-22 NOTE — PHYSICAL THERAPY INITIAL EVALUATION ADULT - GENERAL OBSERVATIONS, REHAB EVAL
Pt rec'd supine in bed, pleasant and cooperative with PT, no c/o pain, but endorses soreness at incision site.

## 2023-10-22 NOTE — PROGRESS NOTE ADULT - SUBJECTIVE AND OBJECTIVE BOX
Subjective: Pain at surgical site on left side. No chest tube drainage overnight    Allergies: penicillin  erythromycin    PAST MEDICAL & SURGICAL HISTORY:  Intraductal carcinoma in situ of right breast      Hyperlipidemia      Lymphoma      GERD (gastroesophageal reflux disease)      Lung nodules      Scoliosis      Kyphosis      H/O total hysterectomy      H/O lumpectomy      S/P cataract surgery      H/O lymph node biopsy      H/O colonoscopy        FAMILY HISTORY:  FH: breast cancer  2 first cousins, paternal grandmother    FH: colon cancer (Father, Aunt)    FH: type 2 diabetes (Father)    FH: liver cancer (Father)      SOCIAL HISTORY:    Home Medications:    Review of Systems:  Pertinent positives noted above, all other ROS negative x10 system    T(F): 97.7 (10-21-23 @ 08:15), Max: 98 (10-20-23 @ 21:00)  HR: 76 (10-21-23 @ 08:50) (76 - 94)  BP: 135/67 (10-21-23 @ 06:00) (104/68 - 137/67)  RR: 16 (10-21-23 @ 06:00) (15 - 26)  SpO2: 100% (10-21-23 @ 06:00)  Wt(kg): --    CAPILLARY BLOOD GLUCOSE        I&O's Summary    20 Oct 2023 07:01  -  21 Oct 2023 07:00  --------------------------------------------------------  IN: 0 mL / OUT: 175 mL / NET: -175 mL        Physical Exam:     Gen: NAD  Neuro: awake/alert  CVS: +S1S2  Resp: CTA. Left chest tube dressing C/D/I. Minimal serosangenous output  Abd: soft NT ND  Ext: warm well perfused    Meds:          albuterol    90 MICROgram(s) HFA Inhaler 2 Puff(s) Inhalation every 6 hours PRN  umeclidinium 62.5 MICROgram(s)/vilanterol 25 MICROgram(s) Inhaler 1 Puff(s) Inhalation daily     acetaminophen   IVPB .. 1000 milliGRAM(s) IV Intermittent once  ondansetron Injectable 4 milliGRAM(s) IV Push every 6 hours PRN  oxyCODONE    IR 10 milliGRAM(s) Oral every 4 hours PRN  oxyCODONE    IR 5 milliGRAM(s) Oral every 4 hours PRN        heparin   Injectable 5000 Unit(s) SubCutaneous every 12 hours           sodium chloride 0.9%. 1000 milliLiter(s) IV Continuous <Continuous>        lidocaine   4% Patch 1 Patch Transdermal every 24 hours     naloxone Injectable 0.1 milliGRAM(s) IV Push every 3 minutes PRN                           12.0   7.79  )-----------( 277      ( 20 Oct 2023 05:46 )             36.0       10-20    139  |  106  |  16  ----------------------------<  106<H>  4.3   |  30  |  0.46<L>    Ca    8.6      20 Oct 2023 05:46  Mg     2.3     10-20              Urinalysis Basic - ( 20 Oct 2023 05:46 )    Color: x / Appearance: x / SG: x / pH: x  Gluc: 106 mg/dL / Ketone: x  / Bili: x / Urobili: x   Blood: x / Protein: x / Nitrite: x   Leuk Esterase: x / RBC: x / WBC x   Sq Epi: x / Non Sq Epi: x / Bacteria: x              Radiology:   < from: Xray Chest 1 View-PORTABLE IMMEDIATE (Xray Chest 1 View-PORTABLE IMMEDIATE .) (10.19.23 @ 10:19) >    ACC: [id number] EXAM:  XR CHEST PORTABLE IMMED 1V   ORDERED BY: TONE HOWELL     PROCEDURE DATE:  10/19/2023          INTERPRETATION:  AP semierect chest on October 19, 2023 at 10:15 AM. CAT   scan of June 6 this year showed an increasing groundglass density in the   left upper lobe lesion. Patient had subsequent surgery. This is a postop   study.    Heart size normal. Left chest tube is in place.    There is mild infiltrate in the left upper lobe area. No pneumothorax.    IMPRESSION: Postop findings as above.    --- End of Report ---            ROBI CHAIDEZ MD; Attending Radiologist  This document has been electronically signed. Oct 19 2023 10:27AM    < end of copied text >        Assessment/Plan:  78 year old female with PMH of scoliosis, lymphoma, venous insufficiency states lung nodule found incidentally years ago and has been monitored routinely; recent imaging revealed a change in size/shape; s/p 10/19 left VATS, robotic assisted, left upper lob wedge resection     -Minimal CT output overnight. CXR stable this AM. Discussed w/ Dr. Piper. Chest tube discontinued  -Additional pain RX ordered earlier w/ Dilaudid IVP for breakthrough  Incentive spiromety  -OOB to chair  -PT eval ordered for weakness    Discussed w/ Dr. Piper  Time spent on this patient encounter, which includes documenting this note in the electronic medical record, was 42 minutes including assessing the presenting problems with associated risks, reviewing the medical record to prepare for the encounter, and meeting face to face with the patient to obtain additional history.  I have also performed an appropriate physical exam, made interventions listed and ordered and interpreted appropriate diagnostic studies as documented.     To improve communication and patient safety, I have coordinated care with the multidisciplinary team including the bedside nurse, appropriate attending of record and consultants as needed.    
  Subjective: Pt in chair NAD. No issues overnight. c/o left  shoulder pain     T(C): 36.6 (10-20-23 @ 15:22), Max: 36.8 (10-20-23 @ 05:50)  HR: 89 (10-20-23 @ 16:00) (66 - 89)  BP: 120/90 (10-20-23 @ 16:00) (86/29 - 121/72)  ABP: --  ABP(mean): --  RR: 15 (10-20-23 @ 16:00) (12 - 26)  SpO2: 96% (10-20-23 @ 16:00) (94% - 100%) 2 L NC   Wt(kg): --  CVP(mm Hg): --  CO: --  CI: --  PA: --                                              Tele: SR     CHEST TUBE:    235cc                            OUTPUT:     per 24 hours    AIR LEAKS:  [ ] YES [x ] NO          10-20    139  |  106  |  16  ----------------------------<  106<H>  4.3   |  30  |  0.46<L>    Ca    8.6      20 Oct 2023 05:46  Mg     2.3     10-20                                 12.0   7.79  )-----------( 277      ( 20 Oct 2023 05:46 )             36.0                 CAPILLARY BLOOD GLUCOSE               CXR:  < from: Xray Chest 1 View-PORTABLE IMMEDIATE (Xray Chest 1 View-PORTABLE IMMEDIATE .) (10.19.23 @ 10:19) >    INTERPRETATION:  AP semierect chest on October 19, 2023 at 10:15 AM. CAT   scan of June 6 this year showed an increasing groundglass density in the   left upper lobe lesion. Patient had subsequent surgery. This is a postop   study.    Heart size normal. Left chest tube is in place.    There is mild infiltrate in the left upper lobe area. No pneumothorax.    IMPRESSION: Postop findings as above.    < end of copied text >          exam   Neuro:  Alert Awake NAD  Pulm: decreased b/l + Left CT  CV: RRR   Abd:  soft   Extremities: warm         Assessment:  78yFemale    with PAST MEDICAL & SURGICAL HISTORY:  Intraductal carcinoma in situ of right breast      Hyperlipidemia      Lymphoma      GERD (gastroesophageal reflux disease)      Lung nodules      Scoliosis      Kyphosis      H/O total hysterectomy      H/O lumpectomy      S/P cataract surgery      H/O lymph node biopsy      H/O colonoscopy            Plan:  
BRIEF HOSPITAL COURSE: 78 year old female with PMHxof scoliosis, lymphoma, venous insufficiency, incidental lung nodules presents for planned left VATS, robotic assisted, left upper lob wedge resection on 10/19/2023 with Dr. Piper    s/p robot assisted JUSTIN wedge resection   EBL 10cc     10/19 C/O r sided rib pain d/t chest tube. denies sob, nausea,   10/20 still with R rib pain, mild improvement.     Evening: Complaint of pain 2/2 chest tube    PAST MEDICAL & SURGICAL HISTORY:  Intraductal carcinoma in situ of right breast      Hyperlipidemia      Lymphoma      GERD (gastroesophageal reflux disease)      Lung nodules      Scoliosis      Kyphosis      H/O total hysterectomy      H/O lumpectomy      S/P cataract surgery      H/O lymph node biopsy      H/O colonoscopy          Review of Systems:  Negative unless stated      Medications:      albuterol    90 MICROgram(s) HFA Inhaler 2 Puff(s) Inhalation every 6 hours PRN  umeclidinium 62.5 MICROgram(s)/vilanterol 25 MICROgram(s) Inhaler 1 Puff(s) Inhalation daily    acetaminophen   IVPB .. 1000 milliGRAM(s) IV Intermittent once  HYDROmorphone  Injectable 0.25 milliGRAM(s) IV Push once  ondansetron Injectable 4 milliGRAM(s) IV Push every 6 hours PRN  oxyCODONE    IR 10 milliGRAM(s) Oral every 4 hours PRN  oxyCODONE    IR 5 milliGRAM(s) Oral every 4 hours PRN      heparin   Injectable 5000 Unit(s) SubCutaneous every 12 hours          sodium chloride 0.9%. 1000 milliLiter(s) IV Continuous <Continuous>      lidocaine   4% Patch 1 Patch Transdermal every 24 hours    naloxone Injectable 0.1 milliGRAM(s) IV Push every 3 minutes PRN          ICU Vital Signs Last 24 Hrs  T(C): 36.7 (20 Oct 2023 21:00), Max: 36.8 (20 Oct 2023 05:50)  T(F): 98 (20 Oct 2023 21:00), Max: 98.3 (20 Oct 2023 09:19)  HR: 94 (21 Oct 2023 02:00) (72 - 94)  BP: 137/67 (21 Oct 2023 02:00) (86/29 - 137/67)  BP(mean): 80 (21 Oct 2023 02:00) (46 - 103)  ABP: --  ABP(mean): --  RR: 18 (21 Oct 2023 02:00) (12 - 26)  SpO2: 96% (21 Oct 2023 02:00) (92% - 100%)    O2 Parameters below as of 20 Oct 2023 19:00  Patient On (Oxygen Delivery Method): room air                I&O's Detail    19 Oct 2023 07:01  -  20 Oct 2023 07:00  --------------------------------------------------------  IN:    IV PiggyBack: 100 mL    Oral Fluid: 240 mL    Other (mL): 500 mL    Other (mL): 424 mL  Total IN: 1264 mL    OUT:    Chest Tube (mL): 235 mL    Voided (mL): 850 mL  Total OUT: 1085 mL    Total NET: 179 mL      20 Oct 2023 07:01  -  21 Oct 2023 02:38  --------------------------------------------------------  IN:  Total IN: 0 mL    OUT:    Chest Tube (mL): 75 mL    Voided (mL): 100 mL  Total OUT: 175 mL    Total NET: -175 mL            LABS:                        12.0   7.79  )-----------( 277      ( 20 Oct 2023 05:46 )             36.0     10-20    139  |  106  |  16  ----------------------------<  106<H>  4.3   |  30  |  0.46<L>    Ca    8.6      20 Oct 2023 05:46  Mg     2.3     10-20            CAPILLARY BLOOD GLUCOSE          Urinalysis Basic - ( 20 Oct 2023 05:46 )    Color: x / Appearance: x / SG: x / pH: x  Gluc: 106 mg/dL / Ketone: x  / Bili: x / Urobili: x   Blood: x / Protein: x / Nitrite: x   Leuk Esterase: x / RBC: x / WBC x   Sq Epi: x / Non Sq Epi: x / Bacteria: x      CULTURES:      Physical Examination:    General: No acute distress.    HEENT: Pupils equal, reactive to light.  Symmetric.  PULM: Clear to auscultation bilaterally  CVS: Regular rate and rhythm, no murmurs  ABD: Soft, nondistended, nontender  EXT: No edema, nontender  SKIN: Warm and well perfused, no rashes noted.  NEURO: Alert, oriented, interactive    < from: Xray Chest 1 View-PORTABLE IMMEDIATE (Xray Chest 1 View-PORTABLE IMMEDIATE .) (10.19.23 @ 10:19) >    INTERPRETATION:  AP semierect chest on October 19, 2023 at 10:15 AM. CAT   scan of June 6 this year showed an increasing groundglass density in the   left upper lobe lesion. Patient had subsequent surgery. This is a postop   study.    Heart size normal. Left chest tube is in place.    There is mild infiltrate in the left upper lobe area. No pneumothorax.    IMPRESSION: Postop findings as above.    --- End of Report ---            ROBI CHAIDEZ MD; Attending Radiologist  This document has been electronically signed. Oct 19 2023 10:27AM    < end of copied text >    
CTS Progress Note    HPI:    S:    Pt seen and examined  HD # 4  78y  Female  PMH of scoliosis, lymphoma, venous insufficiency states lung nodule found incidentally years ago and has been monitored routinely; recent imaging revealed a change in size/shape; s/p 10/19 left VATS, robotic assisted, left upper lob wedge resection    10/22 AM: CT removed yesterday. OOB yesterday, ambulating, using IS, eating. Pain controlled.    ROS: + pain at surgical site, improved  Remainder of systems reviewed, negative    Allergies    penicillin (Swelling; Rash)  erythromycin (Nausea; Vomiting)    Intolerances    MEDICATIONS  (STANDING):    acetaminophen   IVPB .. 1000 milliGRAM(s) IV Intermittent once  heparin   Injectable 5000 Unit(s) SubCutaneous every 12 hours  lidocaine   4% Patch 1 Patch Transdermal every 24 hours  sodium chloride 0.9%. 1000 milliLiter(s) (30 mL/Hr) IV Continuous <Continuous>  tiotropium 2.5 MICROgram(s) Inhaler 2 Puff(s) Inhalation daily    MEDICATIONS  (PRN):    albuterol    90 MICROgram(s) HFA Inhaler 2 Puff(s) Inhalation every 6 hours PRN Shortness of Breath and/or Wheezing  bisacodyl 5 milliGRAM(s) Oral every 12 hours PRN Constipation  naloxone Injectable 0.1 milliGRAM(s) IV Push every 3 minutes PRN For ANY of the following changes in patient status:  A. RR LESS THAN 10 breaths per minute, B. Oxygen saturation LESS THAN 90%, C. Sedation score of 6  ondansetron Injectable 4 milliGRAM(s) IV Push every 6 hours PRN Nausea  oxyCODONE    IR 10 milliGRAM(s) Oral every 4 hours PRN Severe Pain (7 - 10)  oxyCODONE    IR 5 milliGRAM(s) Oral every 4 hours PRN Moderate Pain (4 - 6)    Drug Dosing Weight    Height (cm): 157.5 (19 Oct 2023 06:37)  Weight (kg): 54.4 (19 Oct 2023 06:37)  BMI (kg/m2): 21.9 (19 Oct 2023 06:37)  BSA (m2): 1.54 (19 Oct 2023 06:37)    PAST MEDICAL & SURGICAL HISTORY:    Intraductal carcinoma in situ of right breast  Hyperlipidemia  Lymphoma      GERD (gastroesophageal reflux disease)      Lung nodules      Scoliosis      Kyphosis      H/O total hysterectomy      H/O lumpectomy      S/P cataract surgery      H/O lymph node biopsy      H/O colonoscopy          FAMILY HISTORY:  FH: breast cancer  2 first cousins, paternal grandmother    FH: colon cancer (Father, Aunt)    FH: type 2 diabetes (Father)    FH: liver cancer (Father)        ROS: See HPI; otherwise, all systems reviewed and negative.    O:    ICU Vital Signs Last 24 Hrs  T(C): 36.3 (22 Oct 2023 09:09), Max: 37.1 (21 Oct 2023 14:09)  T(F): 97.4 (22 Oct 2023 09:09), Max: 98.8 (21 Oct 2023 14:09)  HR: 95 (22 Oct 2023 10:00) (76 - 95)  BP: 109/77 (22 Oct 2023 10:00) (105/61 - 129/113)  BP(mean): 82 (22 Oct 2023 10:00) (73 - 120)  ABP: --  ABP(mean): --  RR: 25 (22 Oct 2023 10:00) (15 - 25)  SpO2: 94% (22 Oct 2023 10:00) (94% - 100%)    O2 Parameters below as of 21 Oct 2023 18:00  Patient On (Oxygen Delivery Method): nasal cannula  O2 Flow (L/min): 2              I&O's Detail    21 Oct 2023 07:01  -  22 Oct 2023 07:00  --------------------------------------------------------  IN:  Total IN: 0 mL    OUT:    Voided (mL): 900 mL  Total OUT: 900 mL    Total NET: -900 mL              PE:    Adult F lying in bed  No JVD trachea midline  Normocephalic, atraumatic  + Lt chest surgical incision C/D/I  S1S2+  CTA B/L  Abd soft NTND  No leg swelling/edema noted  Awake and alert  Skin pink, warm    LABS:                CAPILLARY BLOOD GLUCOSE                
Patient is a 78y old  Female who presents with a chief complaint of     BRIEF HOSPITAL COURSE: 78 year old female with PMHxof scoliosis, lymphoma, venous insufficiency, incidental lung nodules presents for planned left VATS, robotic assisted, left upper lob wedge resection on 10/19/2023 with Dr. Piper    s/p robot assisted JUSTIN wedge resection   EBL 10cc     10/19 C/O r sided rib pain d/t chest tube. denies sob, nausea,   10/20 still with R rib pain, mild improvement.     PAST MEDICAL & SURGICAL HISTORY:  Intraductal carcinoma in situ of right breast  Hyperlipidemia  Lymphoma  GERD (gastroesophageal reflux disease)  Lung nodules  Scoiosis  Kyphosis  H/O total hysterectomy  H/O lumpectomy  S/P cataract surgery  H/O lymph node biopsy  H/O colonoscopy    MEDICATIONS  (STANDING):  acetaminophen   IVPB .. 1000 milliGRAM(s) IV Intermittent once  heparin   Injectable 5000 Unit(s) SubCutaneous every 12 hours  HYDROmorphone PCA (1 mG/mL) 30 milliLiter(s) PCA Continuous PCA Continuous  lidocaine   4% Patch 1 Patch Transdermal every 24 hours  sodium chloride 0.9%. 1000 milliLiter(s) (30 mL/Hr) IV Continuous <Continuous>  umeclidinium 62.5 MICROgram(s)/vilanterol 25 MICROgram(s) Inhaler 1 Puff(s) Inhalation daily    Vital Signs Last 24 Hrs  T(C): 36.8 (20 Oct 2023 09:19), Max: 36.8 (19 Oct 2023 16:25)  T(F): 98.3 (20 Oct 2023 09:19), Max: 98.3 (20 Oct 2023 09:19)  HR: 79 (20 Oct 2023 11:00) (66 - 89)  BP: 104/68 (20 Oct 2023 11:00) (86/29 - 148/99)  BP(mean): 80 (20 Oct 2023 11:00) (46 - 113)  RR: 24 (20 Oct 2023 11:00) (12 - 24)  SpO2: 94% (20 Oct 2023 11:00) (94% - 100%)    Parameters below as of 20 Oct 2023 10:00  Patient On (Oxygen Delivery Method): nasal cannula  O2 Flow (L/min): 2    I&O's Detail    19 Oct 2023 07:01  -  20 Oct 2023 07:00  --------------------------------------------------------  IN:    IV PiggyBack: 100 mL    Oral Fluid: 240 mL    Other (mL): 500 mL    Other (mL): 424 mL  Total IN: 1264 mL    OUT:    Chest Tube (mL): 235 mL    Voided (mL): 850 mL  Total OUT: 1085 mL    Total NET: 179 mL      LABS:                        12.0   7.79  )-----------( 277      ( 20 Oct 2023 05:46 )             36.0     10-20    139  |  106  |  16  ----------------------------<  106<H>  4.3   |  30  |  0.46<L>    Ca    8.6      20 Oct 2023 05:46  Mg     2.3     10-20      Urinalysis Basic - ( 20 Oct 2023 05:46 )    Color: x / Appearance: x / SG: x / pH: x  Gluc: 106 mg/dL / Ketone: x  / Bili: x / Urobili: x   Blood: x / Protein: x / Nitrite: x   Leuk Esterase: x / RBC: x / WBC x   Sq Epi: x / Non Sq Epi: x / Bacteria: x      CULTURES:    Physical Examination:    General: No acute distress.    HEENT: Pupils equal, reactive to light.  Symmetric.  PULM: Clear to auscultation bilaterally  CVS: Regular rate and rhythm, no murmurs  ABD: Soft, nondistended, nontender  EXT: No edema, nontender  SKIN: Warm and well perfused, no rashes noted.  NEURO: Alert, oriented, interactive    DEVICES:   L CT - waterseal    RADIOLOGY:   
Patient is a 78y old  Female who presents with a chief complaint of     BRIEF HOSPITAL COURSE: 78 year old female with PMHxof scoliosis, lymphoma, venous insufficiency, incidental lung nodules presents for planned left VATS, robotic assisted, left upper lob wedge resection on 10/19/2023 with Dr. Piper    s/p robot assisted JUSTIN wedge resection   EBL 10cc     10/19 C/O r sided rib pain d/t chest tube. denies sob, nausea,   10/20 still with R rib pain, mild improvement.  10/21: For chest tube removal today       PAST MEDICAL & SURGICAL HISTORY:  Intraductal carcinoma in situ of right breast      Hyperlipidemia      Lymphoma      GERD (gastroesophageal reflux disease)      Lung nodules      Scoliosis      Kyphosis      H/O total hysterectomy      H/O lumpectomy      S/P cataract surgery      H/O lymph node biopsy      H/O colonoscopy          FAMILY HISTORY:  FH: breast cancer  2 first cousins, paternal grandmother    FH: colon cancer (Father, Aunt)    FH: type 2 diabetes (Father)    FH: liver cancer (Father)        Social Hx:    Allergies    penicillin (Swelling; Rash)  erythromycin (Nausea; Vomiting)    Intolerances            ICU Vital Signs Last 24 Hrs  T(C): 36.5 (21 Oct 2023 08:15), Max: 36.7 (20 Oct 2023 21:00)  T(F): 97.7 (21 Oct 2023 08:15), Max: 98 (20 Oct 2023 21:00)  HR: 82 (21 Oct 2023 12:00) (76 - 94)  BP: 128/68 (21 Oct 2023 12:00) (107/59 - 143/84)  BP(mean): 84 (21 Oct 2023 12:00) (73 - 103)  ABP: --  ABP(mean): --  RR: 19 (21 Oct 2023 12:00) (14 - 26)  SpO2: 99% (21 Oct 2023 12:00) (92% - 100%)    O2 Parameters below as of 20 Oct 2023 19:00  Patient On (Oxygen Delivery Method): room air                I&O's Summary    20 Oct 2023 07:01  -  21 Oct 2023 07:00  --------------------------------------------------------  IN: 0 mL / OUT: 175 mL / NET: -175 mL                              12.0   7.79  )-----------( 277      ( 20 Oct 2023 05:46 )             36.0       10-20    139  |  106  |  16  ----------------------------<  106<H>  4.3   |  30  |  0.46<L>    Ca    8.6      20 Oct 2023 05:46  Mg     2.3     10-20                  Urinalysis Basic - ( 20 Oct 2023 05:46 )    Color: x / Appearance: x / SG: x / pH: x  Gluc: 106 mg/dL / Ketone: x  / Bili: x / Urobili: x   Blood: x / Protein: x / Nitrite: x   Leuk Esterase: x / RBC: x / WBC x   Sq Epi: x / Non Sq Epi: x / Bacteria: x        MEDICATIONS  (STANDING):  acetaminophen   IVPB .. 1000 milliGRAM(s) IV Intermittent once  heparin   Injectable 5000 Unit(s) SubCutaneous every 12 hours  lidocaine   4% Patch 1 Patch Transdermal every 24 hours  sodium chloride 0.9%. 1000 milliLiter(s) (30 mL/Hr) IV Continuous <Continuous>  umeclidinium 62.5 MICROgram(s)/vilanterol 25 MICROgram(s) Inhaler 1 Puff(s) Inhalation daily    MEDICATIONS  (PRN):  albuterol    90 MICROgram(s) HFA Inhaler 2 Puff(s) Inhalation every 6 hours PRN Shortness of Breath and/or Wheezing  naloxone Injectable 0.1 milliGRAM(s) IV Push every 3 minutes PRN For ANY of the following changes in patient status:  A. RR LESS THAN 10 breaths per minute, B. Oxygen saturation LESS THAN 90%, C. Sedation score of 6  ondansetron Injectable 4 milliGRAM(s) IV Push every 6 hours PRN Nausea  oxyCODONE    IR 5 milliGRAM(s) Oral every 4 hours PRN Moderate Pain (4 - 6)  oxyCODONE    IR 10 milliGRAM(s) Oral every 4 hours PRN Severe Pain (7 - 10)      DVT Prophylaxis: [facility]    Advanced Directives:  Discussed with:    Visit Information:    ** Time is exclusive of billed procedures and/or teaching and/or routine family updates.

## 2023-10-22 NOTE — PROGRESS NOTE ADULT - ASSESSMENT
78 year old female with PMH of scoliosis, lymphoma, venous insufficiency states lung nodule found incidentally years ago and has been monitored routinely; recent imaging revealed a change in size/shape; s/p 10/19 left VATS, robotic assisted, left upper lob wedge resection     Plan   CT to [facility] today  CXR in am   IS  OOB  Pain mgmt PCA   plan to d/c CT tomorrow and home  Change CT dressing today   DW Dr Piper   
78 year old female with PMHxof scoliosis, lymphoma, venous insufficiency, incidental lung nodules presents for planned left VATS, robotic assisted, left upper lob wedge resection on 10/19/2023 with Dr. Piper    1) Lung mass s/p resection     Assessment     S/p robotic lung resection for JUSTIN mass.   Chest tube placed in OR causing significant pain   PCA recently stopped. Patient given Acetominophen and additional dose of Dilaudid this evening. Oxycodone IR ordered for moderate and severe pain. Will consider adding breath through doses of dilaudid if pain is not adequetely controlled. Risk of poor pain control are discomfort leading to atelectasis and PNA. Avoiding NSAIDs due to recent OR exposure.   CT will be removed in AM if CXR stable  Continue DVT ppx     Time spent on this patient encounter, which includes documenting this note in the electronic medical record, was 42 minutes including assessing the presenting problems with associated risks, reviewing the medical record to prepare for the encounter, and meeting face to face with patient to obtain additional history. I have also performed an appropriate physical exam, made interventions listed and ordered and interpreted appropriate diagnostic studies as documented. To improve communication and patient saftey, I have coordinated care with the multidisciplinary team including the bedside nurse, appropriate attending of record and consultants as needed. 
A:    78yFemale  HD # 4    Here for:    1. Lung nodule s/p VATS wedge rxn POD # 3  2. Lymphoma  3. HLD    This patient requires a moderate level of care due to the complexity and severity of their condition which increases the amount and complexity of data to be reviewed and analyzed in addition to the risk of complications, morbidity and mortality of patient management    P:    CT removed, improving  Ambulate, IS  PT eval  Pain control  OOB as much hannah possible  Diet  Med rec    Dispo: Hopeful for d/c home later today, f/u with Dr Piper as outpt    Time spent on this patient encounter: 35+ minutes    This includes assessment of the presenting problems with associated risks, reviewing the meeical record to prepare for the encounter and meeting face to face with the patient to obtain additional history. I have also performed an appropiate physical exam, made interventions listed and ordered and interpreted appropiate diagnostic studies as documented. This also included communication and coordination of care with the multidisciplinary team including the bedside nurse, appropriate attending of record and consultants as needed.        
ASSESSMENT  78 year old female with PMHxof scoliosis, lymphoma, venous insufficiency, incidental lung nodules presents for planned left VATS, robotic assisted, left upper lob wedge resection on 10/19/2023 with Dr. Piper    s/p robot assisted JUSTIN wedge resection   EBL 10cc     PLAN  - POD#1  Neuro: mentation intact. pain control prn dilaudid pca. IV tylenol prn  CV: Normotensive. cont to monitor   Pulm: on 2L sating 100%, wean to maintain O2 sat > 94%. on anoro ellipta at home, cont here. L CT To waterseal, + airleak.   GI: Po diet . bowel regimen prn  Nephro: monitor I & Os. Trend renal fxn  Heme: DVT ppx - hep sq. SCDs  PT eval    Dispo: SICU. pain control. incentive spirometer. f/u surgical pathology. CT to remain in place    Will discuss with Dr. Landry
ASSESSMENT  78 year old female with PMHxof scoliosis, lymphoma, venous insufficiency, incidental lung nodules presents for planned left VATS, robotic assisted, left upper lob wedge resection on 10/19/2023 with Dr. Piper    s/p robot assisted JUSTIN wedge resection   EBL 10cc     PLAN  SICu  Pain control  CV: Normotensive. cont to monitor   Pulm: on 2L sating 100%, wean to maintain O2 sat > 94%. on anoro ellipta at home, cont here. D/C Chest tube  GI: Po diet . bowel regimen prn  Nephro: monitor I & Os. Trend renal fxn  Heme: DVT ppx - hep sq. SCDs  PT eval

## 2023-10-22 NOTE — PHYSICAL THERAPY INITIAL EVALUATION ADULT - PERTINENT HX OF CURRENT PROBLEM, REHAB EVAL
78 year old female with PMHxof scoliosis, lymphoma, venous insufficiency, incidental lung nodules presents for planned left VATS, robotic assisted, left upper lob wedge resection on 10/19/2023 with Dr. Piper

## 2023-10-23 ENCOUNTER — TRANSCRIPTION ENCOUNTER (OUTPATIENT)
Age: 78
End: 2023-10-23

## 2023-10-23 VITALS — DIASTOLIC BLOOD PRESSURE: 82 MMHG | HEART RATE: 99 BPM | SYSTOLIC BLOOD PRESSURE: 138 MMHG | OXYGEN SATURATION: 93 %

## 2023-10-23 PROCEDURE — 99238 HOSP IP/OBS DSCHRG MGMT 30/<: CPT

## 2023-10-23 RX ADMIN — LIDOCAINE 1 PATCH: 4 CREAM TOPICAL at 11:16

## 2023-10-23 RX ADMIN — HEPARIN SODIUM 5000 UNIT(S): 5000 INJECTION INTRAVENOUS; SUBCUTANEOUS at 06:51

## 2023-10-23 RX ADMIN — OXYCODONE HYDROCHLORIDE 10 MILLIGRAM(S): 5 TABLET ORAL at 12:10

## 2023-10-23 RX ADMIN — OXYCODONE HYDROCHLORIDE 10 MILLIGRAM(S): 5 TABLET ORAL at 03:50

## 2023-10-23 RX ADMIN — OXYCODONE HYDROCHLORIDE 10 MILLIGRAM(S): 5 TABLET ORAL at 11:14

## 2023-10-23 RX ADMIN — TIOTROPIUM BROMIDE 2 PUFF(S): 18 CAPSULE ORAL; RESPIRATORY (INHALATION) at 08:49

## 2023-10-23 NOTE — DISCHARGE NOTE NURSING/CASE MANAGEMENT/SOCIAL WORK - NSDCFUADDAPPT_GEN_ALL_CORE_FT
Leave dressing intact until tomorrow evening, reinforcing with tape if necessary (about 36 hours from chest tube removal). At that time you may remove the dressing and take a shower. Place clean gauze over wound if continual drainage. Call Dr. Piper's office at [phone] tomorrow or the next business day to make a followup appointment. Call the office if you experience any fevers, shortness of breath, chest pain or excessive drainage from the incision, day or night. Go to the emergency room if any of these symptoms are severe. Take your medications as ordered and take a stool softener if needed with the narcotic medications.     You may take the dressings off Sunday and take a shower  No Ointments creams lotions to wounds

## 2023-10-23 NOTE — DISCHARGE NOTE NURSING/CASE MANAGEMENT/SOCIAL WORK - NSDCPEFALRISK_GEN_ALL_CORE
For information on Fall & Injury Prevention, visit: [email or web address] OR  [email or web address] OR  https://www.cdc.gov/steadi/patient.html

## 2023-10-23 NOTE — DISCHARGE NOTE NURSING/CASE MANAGEMENT/SOCIAL WORK - PATIENT PORTAL LINK FT
You can access the FollowMyHealth Patient Portal offered by [facility] by registering at the following website: [email or web address]. By joining [employer]’s FollowMyHealth portal, you will also be able to view your health information using other applications (apps) compatible with our system.

## 2023-10-24 ENCOUNTER — NON-APPOINTMENT (OUTPATIENT)
Age: 78
End: 2023-10-24

## 2023-10-24 LAB
SURGICAL PATHOLOGY STUDY: SIGNIFICANT CHANGE UP
SURGICAL PATHOLOGY STUDY: SIGNIFICANT CHANGE UP

## 2023-10-26 LAB
CULTURE RESULTS: SIGNIFICANT CHANGE UP
CULTURE RESULTS: SIGNIFICANT CHANGE UP
SPECIMEN SOURCE: SIGNIFICANT CHANGE UP
SPECIMEN SOURCE: SIGNIFICANT CHANGE UP

## 2023-10-30 NOTE — CDI QUERY NOTE - NSCDIOTHERTXTBX_GEN_ALL_CORE_HH
The patient is documented with history of Lymphoma. Could be please, further specified    -Lymphoma , in remission  -Lymphoma did not reached remission  -Other please specify      Chart documentation    DC summary-78 year old female with PMH of scoliosis, lymphoma, venous insufficiency states lung nodule found incidentally years ago and has been monitored routinely; recent imaging revealed a change in size/shape; s/p 10/19 left VATS, robotic assisted, left upper lob wedge resection     H&P- Assessment and Plan: Assessment:· [id number] year old female with PMH of scoliosis, lymphoma, venous insufficiency states lung nodule found incidentally years ago and has been monitored routinely; recent imaging revealed a change in size/shape; patient presents to PST for planned left VATS, robotic assisted, left upper lob wedge resection on 10/19/2023 with Dr. Piper
PATHOLOGY FINDINGS     Pathology Finding:     Further clarification is required regarding pathology findings.  No documentation addressing the pathology findings were found in the medical record at time of this review.     Please clarify after further study, evaluation, and treatment if you concur with the Pathology findings.     -Concur with Pathology Findings Notin. LUNG WEDGE (LEFT UPPER LOBE): - ADENOCARCINOMA, WELMODERATELY DIFFERENTIATED, NO GREATER THAN 1.9 CM.   - TUMOR IS LIMITED TO LUNG PARENCHYMA. NO INVOLVEMENT OF PLEURA IDENTIFIED. - STAPLE LINE RESECTION MARGIN IS FREE OF TUMOR (0.7 CM).     -Do Not Concur with Pathology Findings-1. LUNG WEDGE (LEFT UPPER LOBE): - ADENOCARCINOMA, WELMODERATELY DIFFERENTIATED, NO GREATER THAN 1.9 CM.   - TUMOR IS LIMITED TO LUNG PARENCHYMA. NO INVOLVEMENT OF PLEURA IDENTIFIED. - STAPLE LINE RESECTION MARGIN IS FREE OF TUMOR (0.7 CM).     -Other Please Specify    Chart Documentation Location:     Progress notes (if have):     Pathology Findings:Surgical Pathology Report (10.19.23 @ 07:41)Patient: YELITZA VALDEZ Accession: [id number] Collected Date/Time: 10/19/2023 07:41 EDT   1 Left upper lobe wedge resection 2 Left level 5 lymph node 3 Left level 10 lymph node #1 4 Left level 10 lymph node #2 5 Left level 9 lymph node 6 Left level 8 lymph node   Final Diagnosis 1. LUNG WEDGE (LEFT UPPER LOBE): - ADENOCARCINOMA, WELMODERATELY DIFFERENTIATED, NO GREATER THAN 1.9 CM.   - TUMOR IS LIMITED TO LUNG PARENCHYMA. NO INVOLVEMENT OF PLEURA IDENTIFIED. - STAPLE LINE RESECTION MARGIN IS FREE OF TUMOR (0.7 CM).

## 2023-10-31 PROBLEM — M41.9 SCOLIOSIS, UNSPECIFIED: Chronic | Status: ACTIVE | Noted: 2023-10-12

## 2023-10-31 PROBLEM — M40.209 UNSPECIFIED KYPHOSIS, SITE UNSPECIFIED: Chronic | Status: ACTIVE | Noted: 2023-10-12

## 2023-10-31 PROBLEM — M89.9 BONE LESION: Status: ACTIVE | Noted: 2023-10-11

## 2023-11-01 ENCOUNTER — APPOINTMENT (OUTPATIENT)
Dept: THORACIC SURGERY | Facility: CLINIC | Age: 78
End: 2023-11-01
Payer: MEDICARE

## 2023-11-01 VITALS
RESPIRATION RATE: 16 BRPM | HEIGHT: 61 IN | OXYGEN SATURATION: 96 % | HEART RATE: 85 BPM | BODY MASS INDEX: 23.03 KG/M2 | DIASTOLIC BLOOD PRESSURE: 81 MMHG | SYSTOLIC BLOOD PRESSURE: 158 MMHG | WEIGHT: 122 LBS

## 2023-11-01 DIAGNOSIS — G89.18 OTHER ACUTE POSTPROCEDURAL PAIN: ICD-10-CM

## 2023-11-01 PROCEDURE — 99024 POSTOP FOLLOW-UP VISIT: CPT

## 2023-11-01 RX ORDER — OXYCODONE 5 MG/1
5 TABLET ORAL EVERY 6 HOURS
Qty: 20 | Refills: 0 | Status: ACTIVE | COMMUNITY
Start: 2023-11-01 | End: 1900-01-01

## 2023-11-02 NOTE — CHART NOTE - NSCHARTNOTEFT_GEN_A_CORE
Patient has untreated chronic lymphocytic leukemia.   I agree with pathology report of lung cancer.   MIRIAM Piper MD

## 2023-11-03 DIAGNOSIS — Z88.1 ALLERGY STATUS TO OTHER ANTIBIOTIC AGENTS STATUS: ICD-10-CM

## 2023-11-03 DIAGNOSIS — I87.2 VENOUS INSUFFICIENCY (CHRONIC) (PERIPHERAL): ICD-10-CM

## 2023-11-03 DIAGNOSIS — Z88.0 ALLERGY STATUS TO PENICILLIN: ICD-10-CM

## 2023-11-03 DIAGNOSIS — M41.9 SCOLIOSIS, UNSPECIFIED: ICD-10-CM

## 2023-11-03 DIAGNOSIS — C85.90 NON-HODGKIN LYMPHOMA, UNSPECIFIED, UNSPECIFIED SITE: ICD-10-CM

## 2023-11-03 DIAGNOSIS — Z85.3 PERSONAL HISTORY OF MALIGNANT NEOPLASM OF BREAST: ICD-10-CM

## 2023-11-03 DIAGNOSIS — C34.12 MALIGNANT NEOPLASM OF UPPER LOBE, LEFT BRONCHUS OR LUNG: ICD-10-CM

## 2023-11-03 DIAGNOSIS — E78.5 HYPERLIPIDEMIA, UNSPECIFIED: ICD-10-CM

## 2023-11-14 ENCOUNTER — OFFICE (OUTPATIENT)
Dept: URBAN - METROPOLITAN AREA CLINIC 104 | Facility: CLINIC | Age: 78
Setting detail: OPHTHALMOLOGY
End: 2023-11-14
Payer: MEDICARE

## 2023-11-14 DIAGNOSIS — Z96.1: ICD-10-CM

## 2023-11-14 DIAGNOSIS — H26.493: ICD-10-CM

## 2023-11-14 DIAGNOSIS — H04.123: ICD-10-CM

## 2023-11-14 DIAGNOSIS — H43.813: ICD-10-CM

## 2023-11-14 PROCEDURE — 92014 COMPRE OPH EXAM EST PT 1/>: CPT | Performed by: SPECIALIST

## 2023-11-14 ASSESSMENT — REFRACTION_MANIFEST
OD_CYLINDER: SPH
OS_SPHERE: PLANO
OD_ADD: +2.50
OS_CYLINDER: SPH
OS_ADD: +2.50
OD_SPHERE: PLANO

## 2023-11-14 ASSESSMENT — SUPERFICIAL PUNCTATE KERATITIS (SPK)
OS_SPK: 1+
OD_SPK: 1+

## 2023-11-14 ASSESSMENT — CONFRONTATIONAL VISUAL FIELD TEST (CVF)
OS_FINDINGS: FULL
OD_FINDINGS: FULL

## 2023-11-24 ENCOUNTER — APPOINTMENT (OUTPATIENT)
Dept: FAMILY MEDICINE | Facility: CLINIC | Age: 78
End: 2023-11-24

## 2023-12-07 ENCOUNTER — APPOINTMENT (OUTPATIENT)
Dept: VASCULAR SURGERY | Facility: CLINIC | Age: 78
End: 2023-12-07
Payer: MEDICARE

## 2023-12-07 VITALS
HEIGHT: 59 IN | SYSTOLIC BLOOD PRESSURE: 143 MMHG | RESPIRATION RATE: 16 BRPM | TEMPERATURE: 97.9 F | DIASTOLIC BLOOD PRESSURE: 79 MMHG | BODY MASS INDEX: 22.98 KG/M2 | WEIGHT: 114 LBS | OXYGEN SATURATION: 97 % | HEART RATE: 97 BPM

## 2023-12-07 DIAGNOSIS — I87.2 VENOUS INSUFFICIENCY (CHRONIC) (PERIPHERAL): ICD-10-CM

## 2023-12-07 DIAGNOSIS — M79.605 PAIN IN RIGHT LEG: ICD-10-CM

## 2023-12-07 DIAGNOSIS — M79.604 PAIN IN RIGHT LEG: ICD-10-CM

## 2023-12-07 PROCEDURE — 93970 EXTREMITY STUDY: CPT

## 2023-12-07 PROCEDURE — 99213 OFFICE O/P EST LOW 20 MIN: CPT

## 2023-12-10 PROBLEM — M79.604 LEG PAIN, BILATERAL: Status: ACTIVE | Noted: 2017-12-15

## 2023-12-10 PROBLEM — I87.2 VENOUS INSUFFICIENCY: Status: ACTIVE | Noted: 2023-02-09

## 2024-01-15 ENCOUNTER — APPOINTMENT (OUTPATIENT)
Dept: CT IMAGING | Facility: CLINIC | Age: 79
End: 2024-01-15
Payer: MEDICARE

## 2024-01-15 ENCOUNTER — OUTPATIENT (OUTPATIENT)
Dept: OUTPATIENT SERVICES | Facility: HOSPITAL | Age: 79
LOS: 1 days | End: 2024-01-15
Payer: MEDICARE

## 2024-01-15 DIAGNOSIS — Z90.710 ACQUIRED ABSENCE OF BOTH CERVIX AND UTERUS: Chronic | ICD-10-CM

## 2024-01-15 DIAGNOSIS — R60.0 LOCALIZED EDEMA: ICD-10-CM

## 2024-01-15 DIAGNOSIS — Z98.49 CATARACT EXTRACTION STATUS, UNSPECIFIED EYE: Chronic | ICD-10-CM

## 2024-01-15 DIAGNOSIS — Z98.890 OTHER SPECIFIED POSTPROCEDURAL STATES: Chronic | ICD-10-CM

## 2024-01-15 PROCEDURE — 74174 CTA ABD&PLVS W/CONTRAST: CPT | Mod: 26,MH

## 2024-01-15 PROCEDURE — 71250 CT THORAX DX C-: CPT | Mod: 26,MH

## 2024-01-15 PROCEDURE — 74174 CTA ABD&PLVS W/CONTRAST: CPT

## 2024-01-15 PROCEDURE — 71250 CT THORAX DX C-: CPT

## 2024-02-07 ENCOUNTER — APPOINTMENT (OUTPATIENT)
Dept: THORACIC SURGERY | Facility: CLINIC | Age: 79
End: 2024-02-07
Payer: MEDICARE

## 2024-02-07 ENCOUNTER — NON-APPOINTMENT (OUTPATIENT)
Age: 79
End: 2024-02-07

## 2024-02-07 VITALS
HEIGHT: 59 IN | RESPIRATION RATE: 16 BRPM | OXYGEN SATURATION: 100 % | DIASTOLIC BLOOD PRESSURE: 72 MMHG | HEART RATE: 88 BPM | SYSTOLIC BLOOD PRESSURE: 137 MMHG | BODY MASS INDEX: 22.98 KG/M2 | WEIGHT: 114 LBS

## 2024-02-07 DIAGNOSIS — C80.1 MALIGNANT (PRIMARY) NEOPLASM, UNSPECIFIED: ICD-10-CM

## 2024-02-07 PROCEDURE — 99213 OFFICE O/P EST LOW 20 MIN: CPT

## 2024-02-07 NOTE — ASSESSMENT
[FreeTextEntry1] : Ms. YELITZA VALDEZ, 78 year old female, former smoker (0.5 ppdx 10-15 yrs, quit in ~2000), w/ hx of HLD, CLL (abdomen, no treatment), GERD, Breast cancer s/p right lumpectomy (2022), ADD, and lung nodule. Recent follow up CT revealing enlarging JUSTIN opacity and increasing solid component. Referred by Dr. Zuniga (heme/onc).  Now s/p Left VATS, robotic assisted JUSTIN wedge resection on 10/19/2023. Path of JUSTIN wedge reveals adenocarcinoma, well moderately differentiated, 1.9 cm. All margins and LNs negative for tumor. [id number] (Stage IA2). Adjacent lung parenchyma with emphysematous changes and foci of intra-alveolar macrophages.  She presents today for follow up with imaging.  I have independently reviewed the medical records and imaging at the time of this office consultation. CT Chest reviewed with patient, stable scan. I recommend she returns to clinic in 6 months with repeat CT Chest without contrast. She is agreeable. Continue follow up with heme/onc.   Recommendations reviewed with patient during this office visit, and all questions answered; Patient instructed on the importance of follow up and verbalizes understanding.    I, ANDREW Hoff, personally performed the evaluation and management (E/M) services for this established patient. That E/M includes conducting the examination, assessing all new/exacerbated conditions, and establishing a new plan of care. Today, my ACP, Wayne Almazan NP, was here to observe my evaluation and management services for this new problem/exacerbated condition to be followed going forward.

## 2024-02-07 NOTE — HISTORY OF PRESENT ILLNESS
[FreeTextEntry1] : Ms. YELITZA VALDEZ, 78 year old female, former smoker (0.5 ppdx 10-15 yrs, quit in ~2000), w/ hx of HLD, CLL (abdomen, no treatment), GERD, Breast cancer s/p right lumpectomy (2022), ADD, and lung nodule. Recent follow up CT revealing enlarging JUSTIN opacity and increasing solid component. Referred by Dr. Zuniga (heme/onc).  CT Chest on 06/06/2023: - Areas of multifocal linear scarring appear unchanged. - Groundglass opacity in the left upper lobe is increased in size now measuring at least 2.8 x 1.9 cm, previously measuring 2.0 x 1.2 cm again noted is to have an internal solid component as well as interval development of new groundglass opacities superiorly measuring 1.3 cm, series 3 image 27 and inferior medially multiple small groundglass opacities identified. - Stable bilateral axillary lymph nodes. - Coronary vascular calcification.  PFTs on 09/18/2023: FVC 2.89, 122.6%; FEV1 1.51, 101.4%; DLCO 11.33, 75%  Now s/p Left VATS, robotic assisted JUSTIN wedge resection on 10/19/2023. Path of JUSTIN wedge reveals adenocarcinoma, well moderately differentiated, 1.9 cm. All margins and LNs negative for tumor. [id number] (Stage IA2). Adjacent lung parenchyma with emphysematous changes and foci of intra-alveolar macrophages.  CT Chest on 01/15/2024: - Stable scattered areas of mild bronchiectasis/mucoid impaction.  - Status post left upper lobe wedge resection. - Mild opacity surrounding the chain sutures.  - Mild subpleural linear scarring/atelectasis within basilar left lower lobe. - Stable 0.9 cm short axis right lower paratracheal lymph node. - Stable subcentimeter and borderline bilateral axillary lymph nodes.  She presents today for 3 months follow up. Overall, she reports to be feeling well. Denies any chest pain, shortness of breath, cough, or hemoptysis.

## 2024-02-26 ENCOUNTER — TRANSCRIPTION ENCOUNTER (OUTPATIENT)
Age: 79
End: 2024-02-26

## 2024-02-26 RX ORDER — DEXTROAMPHETAMINE SACCHARATE, AMPHETAMINE ASPARTATE, DEXTROAMPHETAMINE SULFATE AND AMPHETAMINE SULFATE 7.5; 7.5; 7.5; 7.5 MG/1; MG/1; MG/1; MG/1
30 TABLET ORAL 3 TIMES DAILY
Qty: 90 | Refills: 0 | Status: ACTIVE | COMMUNITY
Start: 2022-06-26 | End: 1900-01-01

## 2024-02-28 ENCOUNTER — APPOINTMENT (OUTPATIENT)
Dept: INTERNAL MEDICINE | Facility: CLINIC | Age: 79
End: 2024-02-28
Payer: MEDICARE

## 2024-02-28 VITALS
WEIGHT: 114 LBS | DIASTOLIC BLOOD PRESSURE: 89 MMHG | OXYGEN SATURATION: 99 % | HEIGHT: 59 IN | SYSTOLIC BLOOD PRESSURE: 135 MMHG | HEART RATE: 96 BPM | BODY MASS INDEX: 22.98 KG/M2 | TEMPERATURE: 97.3 F

## 2024-02-28 DIAGNOSIS — J01.00 ACUTE MAXILLARY SINUSITIS, UNSPECIFIED: ICD-10-CM

## 2024-02-28 DIAGNOSIS — G47.00 INSOMNIA, UNSPECIFIED: ICD-10-CM

## 2024-02-28 DIAGNOSIS — J47.9 BRONCHIECTASIS, UNCOMPLICATED: ICD-10-CM

## 2024-02-28 DIAGNOSIS — C91.10 CHRONIC LYMPHOCYTIC LEUKEMIA OF B-CELL TYPE NOT HAVING ACHIEVED REMISSION: ICD-10-CM

## 2024-02-28 DIAGNOSIS — J06.9 ACUTE UPPER RESPIRATORY INFECTION, UNSPECIFIED: ICD-10-CM

## 2024-02-28 PROCEDURE — G2211 COMPLEX E/M VISIT ADD ON: CPT

## 2024-02-28 PROCEDURE — 99214 OFFICE O/P EST MOD 30 MIN: CPT

## 2024-02-28 RX ORDER — BROMPHENIRAMINE MALEATE, PSEUDOEPHEDRINE HYDROCHLORIDE, 2; 30; 10 MG/5ML; MG/5ML; MG/5ML
30-2-10 SYRUP ORAL
Qty: 1 | Refills: 0 | Status: ACTIVE | COMMUNITY
Start: 2024-02-28 | End: 1900-01-01

## 2024-02-28 RX ORDER — FLUTICASONE PROPIONATE 50 UG/1
50 SPRAY, METERED NASAL TWICE DAILY
Qty: 1 | Refills: 1 | Status: ACTIVE | COMMUNITY
Start: 2024-02-28 | End: 1900-01-01

## 2024-02-28 RX ORDER — DOXYCYCLINE HYCLATE 100 MG/1
100 CAPSULE ORAL
Qty: 20 | Refills: 0 | Status: ACTIVE | COMMUNITY
Start: 2024-02-28 | End: 1900-01-01

## 2024-02-28 RX ORDER — TRAZODONE HYDROCHLORIDE 50 MG/1
50 TABLET ORAL
Qty: 90 | Refills: 3 | Status: ACTIVE | COMMUNITY
Start: 2024-02-28 | End: 1900-01-01

## 2024-02-28 NOTE — PLAN
[FreeTextEntry1] : Patient with sinusitis, will start antibiotics and intranasal steroid today.   Bronchiectasis- patient will continue to follow with pulmonologist   CLL- patient will continue to follow with Heme/Onc  Insomnia   Patient has been advised of FDA warnings of rare but serious injuries, including deaths, have occurred with certain sedative-hypnotics (eszopiclone [Lunesta], zaleplon [Sonata], and zolpidem [Ambien, Ambien CR, Edluar, Intermezzo, Zolpimist]) because of complex sleep behaviors, including sleepwalking, sleep driving, and engaging in other activities while not fully awake. The FDA is requiring the addition of a Boxed Warning to the prescribing information and Medication Guides for these medications. Additionally, use in patients who have previously experienced an episode of complex sleep behavior with these medicines is now contraindicated.  Patient also advised that these medications are intended for short-term use (=4 to 8 weeks), preferably in conjunction with nonpharmacologic therapies. Chronic use should be limited to cases where nonpharmacologic treatments are not available or not effective and benefits are felt to outweigh risks. I have advised against extended use of pharmacologic therapy for insomnia. Use of the higher dose can increase the risk of next-day impairment of driving and activities requiring full alertness.  In joint decision making with patient, it was decided that patient will start trazodone 50 mg PO QHS   Prior to appointment and during encounter with patient extensive medical records were reviewed including but not limited to, Hospital records out patient records, laboratory data and microbiology data  In addition extensive time was also spent in reviewing diagnostic studies.  Total encounter total time 30 mins >50% of time spent counseling/coordinating care   Counseling included abnormal lab results, differential diagnoses, treatment options, risks and benefits, lifestyle changes, prognosis, current condition, medications, and dose adjustments.  The patient was interactive, attentive, asked questions, and verbalized understanding

## 2024-02-28 NOTE — HEALTH RISK ASSESSMENT
[0] : 2) Feeling down, depressed, or hopeless: Not at all (0) [PHQ-2 Negative - No further assessment needed] : PHQ-2 Negative - No further assessment needed [[id number]] : 0 [Former] : Former

## 2024-02-28 NOTE — HISTORY OF PRESENT ILLNESS
[FreeTextEntry1] : sinus congestion [de-identified] : Patient is a 78 year old female with a past medical history of bronchiectasis comes to the office c/o  sinus congestion and green nasal discharge x 10 days. Patient tested negative test for covid. Patient denies fever, cough SOB. No other complaints at this time.

## 2024-02-28 NOTE — PHYSICAL EXAM
[No Acute Distress] : no acute distress [Well Nourished] : well nourished [Well Developed] : well developed [Well-Appearing] : well-appearing [Normal Sclera/Conjunctiva] : normal sclera/conjunctiva [PERRL] : pupils equal round and reactive to light [EOMI] : extraocular movements intact [Normal Outer Ear/Nose] : the outer ears and nose were normal in appearance [No JVD] : no jugular venous distention [No Lymphadenopathy] : no lymphadenopathy [Supple] : supple [No Respiratory Distress] : no respiratory distress  [No Accessory Muscle Use] : no accessory muscle use [Clear to Auscultation] : lungs were clear to auscultation bilaterally [Normal Rate] : normal rate  [Normal S1, S2] : normal S1 and S2 [Regular Rhythm] : with a regular rhythm [No Carotid Bruits] : no carotid bruits [No Abdominal Bruit] : a ~M bruit was not heard ~T in the abdomen [No Varicosities] : no varicosities [No Edema] : there was no peripheral edema [No Palpable Aorta] : no palpable aorta [No Extremity Clubbing/Cyanosis] : no extremity clubbing/cyanosis [Soft] : abdomen soft [Non Tender] : non-tender [Non-distended] : non-distended [No HSM] : no HSM [Normal Bowel Sounds] : normal bowel sounds [Normal Posterior Cervical Nodes] : no posterior cervical lymphadenopathy [Normal Anterior Cervical Nodes] : no anterior cervical lymphadenopathy [No CVA Tenderness] : no CVA  tenderness [No Spinal Tenderness] : no spinal tenderness [No Joint Swelling] : no joint swelling [Grossly Normal Strength/Tone] : grossly normal strength/tone [No Rash] : no rash [Coordination Grossly Intact] : coordination grossly intact [No Focal Deficits] : no focal deficits [Normal Gait] : normal gait [Normal Affect] : the affect was normal [Normal Insight/Judgement] : insight and judgment were intact [de-identified] : fullness of TM bilaterally, cobblestoning of pharynx

## 2024-02-29 LAB
RAPID RVP RESULT: NOT DETECTED
SARS-COV-2 RNA PNL RESP NAA+PROBE: NOT DETECTED

## 2024-05-21 ENCOUNTER — OFFICE (OUTPATIENT)
Dept: URBAN - METROPOLITAN AREA CLINIC 104 | Facility: CLINIC | Age: 79
Setting detail: OPHTHALMOLOGY
End: 2024-05-21
Payer: MEDICARE

## 2024-05-21 DIAGNOSIS — H26.493: ICD-10-CM

## 2024-05-21 DIAGNOSIS — Z96.1: ICD-10-CM

## 2024-05-21 DIAGNOSIS — H43.813: ICD-10-CM

## 2024-05-21 DIAGNOSIS — H04.123: ICD-10-CM

## 2024-05-21 PROCEDURE — 99213 OFFICE O/P EST LOW 20 MIN: CPT | Performed by: SPECIALIST

## 2024-05-21 ASSESSMENT — CONFRONTATIONAL VISUAL FIELD TEST (CVF)
OS_FINDINGS: FULL
OD_FINDINGS: FULL

## 2024-07-17 ENCOUNTER — APPOINTMENT (OUTPATIENT)
Dept: DERMATOLOGY | Facility: CLINIC | Age: 79
End: 2024-07-17
Payer: MEDICARE

## 2024-07-17 PROCEDURE — 17003 DESTRUCT PREMALG LES 2-14: CPT

## 2024-07-17 PROCEDURE — 17000 DESTRUCT PREMALG LESION: CPT

## 2024-07-17 PROCEDURE — 99213 OFFICE O/P EST LOW 20 MIN: CPT | Mod: 25

## 2024-08-01 NOTE — HISTORY OF PRESENT ILLNESS
[FreeTextEntry1] : Ms. YELITZA VALDEZ, 78 year old female, former smoker (0.5 ppdx 10-15 yrs, quit in ~2000), w/ hx of HLD, CLL (abdomen, no treatment), GERD, Breast cancer s/p right lumpectomy (2022), ADD, and lung nodule. Recent follow up CT revealing enlarging JUSTIN opacity and increasing solid component. Referred by Dr. Zuniga (heme/onc).  CT Chest on 06/06/2023: - Areas of multifocal linear scarring appear unchanged. - Groundglass opacity in the left upper lobe is increased in size now measuring at least 2.8 x 1.9 cm, previously measuring 2.0 x 1.2 cm again noted is to have an internal solid component as well as interval development of new groundglass opacities superiorly measuring 1.3 cm, series 3 image 27 and inferior medially multiple small groundglass opacities identified. - Stable bilateral axillary lymph nodes. - Coronary vascular calcification.  PFTs on 09/18/2023: FVC 2.89, 122.6%; FEV1 1.51, 101.4%; DLCO 11.33, 75%  Now s/p Left VATS, robotic assisted JUSTIN wedge resection on 10/19/2023. Path of JUSTIN wedge reveals adenocarcinoma, well moderately differentiated, 1.9 cm. All margins and LNs negative for tumor. [id number] (Stage IA2). Adjacent lung parenchyma with emphysematous changes and foci of intra-alveolar macrophages.  CT Chest on 01/15/2024: - Stable scattered areas of mild bronchiectasis/mucoid impaction.  - Status post left upper lobe wedge resection. - Mild opacity surrounding the chain sutures.  - Mild subpleural linear scarring/atelectasis within basilar left lower lobe. - Stable 0.9 cm short axis right lower paratracheal lymph node. - Stable subcentimeter and borderline bilateral axillary lymph nodes.  CT Chest on 08/15/2024: -   She presents today for 6 months follow up.

## 2024-08-01 NOTE — ASSESSMENT
[FreeTextEntry1] : Ms. YELITZA VALDEZ, 78 year old female, former smoker (0.5 ppdx 10-15 yrs, quit in ~2000), w/ hx of HLD, CLL (abdomen, no treatment), GERD, Breast cancer s/p right lumpectomy (2022), ADD, and lung nodule. Recent follow up CT revealing enlarging JUSTIN opacity and increasing solid component. Referred by Dr. Zuniga (heme/onc).  Now s/p Left VATS, robotic assisted JUSTIN wedge resection on 10/19/2023. Path of JUSTIN wedge reveals adenocarcinoma, well moderately differentiated, 1.9 cm. All margins and LNs negative for tumor. [id number] (Stage IA2). Adjacent lung parenchyma with emphysematous changes and foci of intra-alveolar macrophages.  She presents today for follow up with imaging.  I have independently reviewed the medical records and imaging at the time of this office consultation.   Recommendations reviewed with patient during this office visit, and all questions answered; Patient instructed on the importance of follow up and verbalizes understanding.

## 2024-08-15 ENCOUNTER — APPOINTMENT (OUTPATIENT)
Dept: ULTRASOUND IMAGING | Facility: CLINIC | Age: 79
End: 2024-08-15

## 2024-08-15 ENCOUNTER — APPOINTMENT (OUTPATIENT)
Dept: CT IMAGING | Facility: CLINIC | Age: 79
End: 2024-08-15

## 2024-08-15 ENCOUNTER — APPOINTMENT (OUTPATIENT)
Dept: MAMMOGRAPHY | Facility: CLINIC | Age: 79
End: 2024-08-15

## 2024-08-15 PROCEDURE — 76641 ULTRASOUND BREAST COMPLETE: CPT | Mod: 26,50,3G

## 2024-08-15 PROCEDURE — 71250 CT THORAX DX C-: CPT | Mod: 26,MH

## 2024-08-15 PROCEDURE — G0279: CPT | Mod: 26

## 2024-08-15 PROCEDURE — 77066 DX MAMMO INCL CAD BI: CPT | Mod: 26

## 2024-08-28 ENCOUNTER — NON-APPOINTMENT (OUTPATIENT)
Age: 79
End: 2024-08-28

## 2024-08-28 ENCOUNTER — APPOINTMENT (OUTPATIENT)
Dept: THORACIC SURGERY | Facility: CLINIC | Age: 79
End: 2024-08-28
Payer: MEDICARE

## 2024-08-28 VITALS
BODY MASS INDEX: 22.98 KG/M2 | DIASTOLIC BLOOD PRESSURE: 72 MMHG | SYSTOLIC BLOOD PRESSURE: 125 MMHG | RESPIRATION RATE: 16 BRPM | OXYGEN SATURATION: 97 % | HEIGHT: 59 IN | WEIGHT: 114 LBS

## 2024-08-28 VITALS — HEART RATE: 80 BPM

## 2024-08-28 DIAGNOSIS — C80.1 MALIGNANT (PRIMARY) NEOPLASM, UNSPECIFIED: ICD-10-CM

## 2024-08-28 PROCEDURE — 99213 OFFICE O/P EST LOW 20 MIN: CPT

## 2024-08-28 NOTE — HISTORY OF PRESENT ILLNESS
[FreeTextEntry1] : Ms. YELITZA VALDEZ, 78 year old female, former smoker (0.5 ppdx 10-15 yrs, quit in ~2000), w/ hx of HLD, CLL (abdomen, no treatment), GERD, Breast cancer s/p right lumpectomy (2022), ADD, and lung nodule. Recent follow up CT revealing enlarging JUSTIN opacity and increasing solid component. Referred by Dr. Zuniga (heme/onc).  CT Chest on 06/06/2023: - Areas of multifocal linear scarring appear unchanged. - Groundglass opacity in the left upper lobe is increased in size now measuring at least 2.8 x 1.9 cm, previously measuring 2.0 x 1.2 cm again noted is to have an internal solid component as well as interval development of new groundglass opacities superiorly measuring 1.3 cm, series 3 image 27 and inferior medially multiple small groundglass opacities identified. - Stable bilateral axillary lymph nodes. - Coronary vascular calcification.  PFTs on 09/18/2023: FVC 2.89, 122.6%; FEV1 1.51, 101.4%; DLCO 11.33, 75%  Now s/p Left VATS, robotic assisted JUSTIN wedge resection on 10/19/2023. Path of JUSTIN wedge reveals adenocarcinoma, well moderately differentiated, 1.9 cm. All margins and LNs negative for tumor. [id number] (Stage IA2). Adjacent lung parenchyma with emphysematous changes and foci of intra-alveolar macrophages.  CT Chest on 01/15/2024: - Stable scattered areas of mild bronchiectasis/mucoid impaction.  - Status post left upper lobe wedge resection. - Mild opacity surrounding the chain sutures.  - Mild subpleural linear scarring/atelectasis within basilar left lower lobe. - Stable 0.9 cm short axis right lower paratracheal lymph node. - Stable subcentimeter and borderline bilateral axillary lymph nodes.  CT Chest on 08/15/2024: - Left upper lobe wedge resection with similar postoperative perisutural bandlike opacity.  - New patchy groundglass opacity in the right upper lobe may be infectious or inflammatory.  - Scattered multifocal areas of distal bronchial impaction bilaterally. - Coronary arterial and aortic valvular calcification. - Old rib fractures. Exaggerated thoracic kyphosis.   She presents today for 6 months follow up. Overall, she reports to be feeling well. Denies any chest pain, shortness of breath, cough, or hemoptysis.

## 2024-08-28 NOTE — HISTORY OF PRESENT ILLNESS
[FreeTextEntry1] : Ms. YELITZA VALDEZ, 78 year old female, former smoker (0.5 ppdx 10-15 yrs, quit in ~2000), w/ hx of HLD, CLL (abdomen, no treatment), GERD, Breast cancer s/p right lumpectomy (2022), ADD, and lung nodule. Recent follow up CT revealing enlarging JUSTIN opacity and increasing solid component. Referred by Dr. Zuniga (heme/onc).  CT Chest on 06/06/2023: - Areas of multifocal linear scarring appear unchanged. - Groundglass opacity in the left upper lobe is increased in size now measuring at least 2.8 x 1.9 cm, previously measuring 2.0 x 1.2 cm again noted is to have an internal solid component as well as interval development of new groundglass opacities superiorly measuring 1.3 cm, series 3 image 27 and inferior medially multiple small groundglass opacities identified. - Stable bilateral axillary lymph nodes. - Coronary vascular calcification.  PFTs on 09/18/2023: FVC 2.89, 122.6%; FEV1 1.51, 101.4%; DLCO 11.33, 75%  Now s/p Left VATS, robotic assisted JUSTIN wedge resection on 10/19/2023. Path of JUSTIN wedge reveals adenocarcinoma, well moderately differentiated, 1.9 cm. All margins and LNs negative for tumor. [id number] (Stage IA2). Adjacent lung parenchyma with emphysematous changes and foci of intra-alveolar macrophages.  CT Chest on 01/15/2024: - Stable scattered areas of mild bronchiectasis/mucoid impaction.  - Status post left upper lobe wedge resection. - Mild opacity surrounding the chain sutures.  - Mild subpleural linear scarring/atelectasis within basilar left lower lobe. - Stable 0.9 cm short axis right lower paratracheal lymph node. - Stable subcentimeter and borderline bilateral axillary lymph nodes.  CT Chest on 08/15/2024: - Left upper lobe wedge resection with similar postoperative perisutural bandlike opacity.  - New patchy groundglass opacity in the right upper lobe may be infectious or inflammatory.  - Scattered multifocal areas of distal bronchial impaction bilaterally. - Coronary arterial and aortic valvular calcification. - Old rib fractures. Exaggerated thoracic kyphosis.   She presents today for 6 months follow up. Overall, she reports to be feeling well. Denies any chest pain, shortness of breath, cough, or hemoptysis.

## 2024-08-28 NOTE — ASSESSMENT
[FreeTextEntry1] : Ms. YELITZA VALDEZ, 78 year old female, former smoker (0.5 ppdx 10-15 yrs, quit in ~2000), w/ hx of HLD, CLL (abdomen, no treatment), GERD, Breast cancer s/p right lumpectomy (2022), ADD, and lung nodule. Recent follow up CT revealing enlarging JUSTIN opacity and increasing solid component. Referred by Dr. Zuniga (heme/onc).  Now s/p Left VATS, robotic assisted JUSTIN wedge resection on 10/19/2023. Path of JUSTIN wedge reveals adenocarcinoma, well moderately differentiated, 1.9 cm. All margins and LNs negative for tumor. [id number] (Stage IA2). Adjacent lung parenchyma with emphysematous changes and foci of intra-alveolar macrophages.  She presents today for follow up with imaging.  I have independently reviewed the medical records and imaging at the time of this office consultation. CT Chest reviewed with patient, RUL ggo likely impaction. I recommend she follows up with pulm for further management and return to clinic in 1 year with repeat CT Chest without contrast. She is agreeable.   Recommendations reviewed with patient during this office visit, and all questions answered; Patient instructed on the importance of follow up and verbalizes understanding.    I, ANDREW Hoff, personally performed the evaluation and management (E/M) services for this established patient. That E/M includes conducting the examination, assessing all new/exacerbated conditions, and establishing a new plan of care. Today, my ACP, Wayne Almazan NP, was here to observe my evaluation and management services for this new problem/exacerbated condition to be followed going forward.

## 2024-08-28 NOTE — HISTORY OF PRESENT ILLNESS
[FreeTextEntry1] : Ms. YELITZA VALDEZ, 78 year old female, former smoker (0.5 ppdx 10-15 yrs, quit in ~2000), w/ hx of HLD, CLL (abdomen, no treatment), GERD, Breast cancer s/p right lumpectomy (2022), ADD, and lung nodule. Recent follow up CT revealing enlarging JUSTIN opacity and increasing solid component. Referred by Dr. Zuniga (heme/onc).  CT Chest on 06/06/2023: - Areas of multifocal linear scarring appear unchanged. - Groundglass opacity in the left upper lobe is increased in size now measuring at least 2.8 x 1.9 cm, previously measuring 2.0 x 1.2 cm again noted is to have an internal solid component as well as interval development of new groundglass opacities superiorly measuring 1.3 cm, series 3 image 27 and inferior medially multiple small groundglass opacities identified. - Stable bilateral axillary lymph nodes. - Coronary vascular calcification.  PFTs on 09/18/2023: FVC 2.89, 122.6%; FEV1 1.51, 101.4%; DLCO 11.33, 75%  Now s/p Left VATS, robotic assisted JUSTIN wedge resection on 10/19/2023. Path of JUSTIN wedge reveals adenocarcinoma, well moderately differentiated, 1.9 cm. All margins and LNs negative for tumor. [id number] (Stage IA2). Adjacent lung parenchyma with emphysematous changes and foci of intra-alveolar macrophages.  CT Chest on 01/15/2024: - Stable scattered areas of mild bronchiectasis/mucoid impaction.  - Status post left upper lobe wedge resection. - Mild opacity surrounding the chain sutures.  - Mild subpleural linear scarring/atelectasis within basilar left lower lobe. - Stable 0.9 cm short axis right lower paratracheal lymph node. - Stable subcentimeter and borderline bilateral axillary lymph nodes.  CT Chest on 08/15/2024: - Left upper lobe wedge resection with similar postoperative perisutural bandlike opacity.  - New patchy groundglass opacity in the right upper lobe may be infectious or inflammatory.  - Scattered multifocal areas of distal bronchial impaction bilaterally. - Coronary arterial and aortic valvular calcification. - Old rib fractures. Exaggerated thoracic kyphosis.   She presents today for 6 months follow up. Overall, she reports to be feeling well. Denies any chest pain, shortness of breath, cough, or hemoptysis.

## 2024-08-28 NOTE — HISTORY OF PRESENT ILLNESS
[FreeTextEntry1] : Ms. YELITZA VALDEZ, 78 year old female, former smoker (0.5 ppdx 10-15 yrs, quit in ~2000), w/ hx of HLD, CLL (abdomen, no treatment), GERD, Breast cancer s/p right lumpectomy (2022), ADD, and lung nodule. Recent follow up CT revealing enlarging JUSTIN opacity and increasing solid component. Referred by Dr. Zuniga (heme/onc).  CT Chest on 06/06/2023: - Areas of multifocal linear scarring appear unchanged. - Groundglass opacity in the left upper lobe is increased in size now measuring at least 2.8 x 1.9 cm, previously measuring 2.0 x 1.2 cm again noted is to have an internal solid component as well as interval development of new groundglass opacities superiorly measuring 1.3 cm, series 3 image 27 and inferior medially multiple small groundglass opacities identified. - Stable bilateral axillary lymph nodes. - Coronary vascular calcification.  PFTs on 09/18/2023: FVC 2.89, 122.6%; FEV1 1.51, 101.4%; DLCO 11.33, 75%  Now s/p Left VATS, robotic assisted JUSTIN wedge resection on 10/19/2023. Path of JUSTIN wedge reveals adenocarcinoma, well moderately differentiated, 1.9 cm. All margins and LNs negative for tumor. [id number] (Stage IA2). Adjacent lung parenchyma with emphysematous changes and foci of intra-alveolar macrophages.  CT Chest on 01/15/2024: - Stable scattered areas of mild bronchiectasis/mucoid impaction.  - Status post left upper lobe wedge resection. - Mild opacity surrounding the chain sutures.  - Mild subpleural linear scarring/atelectasis within basilar left lower lobe. - Stable 0.9 cm short axis right lower paratracheal lymph node. - Stable subcentimeter and borderline bilateral axillary lymph nodes.  CT Chest on 08/15/2024: - Left upper lobe wedge resection with similar postoperative perisutural bandlike opacity.  - New patchy groundglass opacity in the right upper lobe may be infectious or inflammatory.  - Scattered multifocal areas of distal bronchial impaction bilaterally. - Coronary arterial and aortic valvular calcification. - Old rib fractures. Exaggerated thoracic kyphosis.   She presents today for 6 months follow up. Overall, she reports to be feeling well. Denies any chest pain, shortness of breath, cough, or hemoptysis.

## 2024-08-28 NOTE — ASSESSMENT
[FreeTextEntry1] : Ms. YELITZA VALDEZ, 78 year old female, former smoker (0.5 ppdx 10-15 yrs, quit in ~2000), w/ hx of HLD, CLL (abdomen, no treatment), GERD, Breast cancer s/p right lumpectomy (2022), ADD, and lung nodule. Recent follow up CT revealing enlarging JUSTIN opacity and increasing solid component. Referred by Dr. Zuniga (heme/onc).  Now s/p Left VATS, robotic assisted JUSTIN wedge resection on 10/19/2023. Path of JUSTIN wedge reveals adenocarcinoma, well moderately differentiated, 1.9 cm. All margins and LNs negative for tumor. [id number] (Stage IA2). Adjacent lung parenchyma with emphysematous changes and foci of intra-alveolar macrophages.  She presents today for follow up with imaging.  I have independently reviewed the medical records and imaging at the time of this office consultation. CT Chest reviewed with patient, RUL ggo likely impaction. I recommend she follows up with pulm for further management and return to clinic in 1 year with repeat CT Chest without contrast. She is agreeable.   Recommendations reviewed with patient during this office visit, and all questions answered; Patient instructed on the importance of follow up and verbalizes understanding.    I, ANDREW Hoff, personally performed the evaluation and management (E/M) services for this established patient. That E/M includes conducting the examination, assessing all new/exacerbated conditions, and establishing a new plan of care. Today, my ACP, Wayne Almazan NP, was here to observe my evaluation and management services for this new problem/exacerbated condition to be followed going forward.

## 2024-08-28 NOTE — ASSESSMENT
[FreeTextEntry1] : Ms. YELITZA VALDEZ, 78 year old female, former smoker (0.5 ppdx 10-15 yrs, quit in ~2000), w/ hx of HLD, CLL (abdomen, no treatment), GERD, Breast cancer s/p right lumpectomy (2022), ADD, and lung nodule. Recent follow up CT revealing enlarging JUSTIN opacity and increasing solid component. Referred by Dr. Zuniga (heme/onc).  Now s/p Left VATS, robotic assisted JUSTIN wedge resection on 10/19/2023. Path of JUSTIN wedge reveals adenocarcinoma, well moderately differentiated, 1.9 cm. All margins and LNs negative for tumor. [id number] (Stage IA2). Adjacent lung parenchyma with emphysematous changes and foci of intra-alveolar macrophages.  She presents today for follow up with imaging.  I have independently reviewed the medical records and imaging at the time of this office consultation. CT Chest reviewed with patient, RUL ggo likely impaction. I recommend she follows up with pulm for further management and return to clinic in 1 year with repeat CT Chest without contrast. She is agreeable.   Recommendations reviewed with patient during this office visit, and all questions answered; Patient instructed on the importance of follow up and verbalizes understanding.    I, ANDREW Hoff, personally performed the evaluation and management (E/M) services for this established patient. That E/M includes conducting the examination, assessing all new/exacerbated conditions, and establishing a new plan of care. Today, my ACP, Wayne Almazan NP, was here to observe my evaluation and management services for this new problem/exacerbated condition to be followed going forward.

## 2024-08-28 NOTE — ASSESSMENT
[FreeTextEntry1] : Ms. YELITZA VALDEZ, 78 year old female, former smoker (0.5 ppdx 10-15 yrs, quit in ~2000), w/ hx of HLD, CLL (abdomen, no treatment), GERD, Breast cancer s/p right lumpectomy (2022), ADD, and lung nodule. Recent follow up CT revealing enlarging JUSTIN opacity and increasing solid component. Referred by Dr. Zuniga (heme/onc).  Now s/p Left VATS, robotic assisted JUSTIN wedge resection on 10/19/2023. Path of JUSTIN wedge reveals adenocarcinoma, well moderately differentiated, 1.9 cm. All margins and LNs negative for tumor. [id number] (Stage IA2). Adjacent lung parenchyma with emphysematous changes and foci of intra-alveolar macrophages.  She presents today for follow up with imaging.  I have independently reviewed the medical records and imaging at the time of this office consultation. CT Chest reviewed with patient, RUL ggo likely impaction. I recommend she follows up with pulm for further management and return to clinic in 1 year with repeat CT Chest without contrast. She is agreeable.   Recommendations reviewed with patient during this office visit, and all questions answered; Patient instructed on the importance of follow up and verbalizes understanding.    I, ANDREW Hoff, personally performed the evaluation and management (E/M) services for this established patient. That E/M includes conducting the examination, assessing all new/exacerbated conditions, and establishing a new plan of care. Today, my ACP, Wayne Almazan NP, was here to observe my evaluation and management services for this new problem/exacerbated condition to be followed going forward.

## 2024-10-27 ENCOUNTER — OUTPATIENT (OUTPATIENT)
Dept: OUTPATIENT SERVICES | Facility: HOSPITAL | Age: 79
LOS: 1 days | Discharge: HOME | End: 2024-10-27

## 2024-10-27 DIAGNOSIS — Z98.49 CATARACT EXTRACTION STATUS, UNSPECIFIED EYE: Chronic | ICD-10-CM

## 2024-10-27 DIAGNOSIS — Z90.710 ACQUIRED ABSENCE OF BOTH CERVIX AND UTERUS: Chronic | ICD-10-CM

## 2024-10-27 DIAGNOSIS — Z98.890 OTHER SPECIFIED POSTPROCEDURAL STATES: Chronic | ICD-10-CM

## 2024-10-27 DIAGNOSIS — C85.10 UNSPECIFIED B-CELL LYMPHOMA, UNSPECIFIED SITE: ICD-10-CM

## 2024-11-01 ENCOUNTER — RESULT REVIEW (OUTPATIENT)
Age: 79
End: 2024-11-01

## 2024-11-01 ENCOUNTER — APPOINTMENT (OUTPATIENT)
Dept: HEMATOLOGY ONCOLOGY | Facility: CLINIC | Age: 79
End: 2024-11-01
Payer: MEDICARE

## 2024-11-01 VITALS
WEIGHT: 116 LBS | BODY MASS INDEX: 23.39 KG/M2 | HEART RATE: 87 BPM | SYSTOLIC BLOOD PRESSURE: 129 MMHG | HEIGHT: 59 IN | OXYGEN SATURATION: 96 % | DIASTOLIC BLOOD PRESSURE: 78 MMHG

## 2024-11-01 DIAGNOSIS — C50.911 MALIGNANT NEOPLASM OF UNSPECIFIED SITE OF RIGHT FEMALE BREAST: ICD-10-CM

## 2024-11-01 DIAGNOSIS — C91.10 CHRONIC LYMPHOCYTIC LEUKEMIA OF B-CELL TYPE NOT HAVING ACHIEVED REMISSION: ICD-10-CM

## 2024-11-01 LAB
BASOPHILS # BLD AUTO: 0.1 K/UL — SIGNIFICANT CHANGE UP (ref 0–0.2)
BASOPHILS NFR BLD AUTO: 1.2 % — SIGNIFICANT CHANGE UP (ref 0–2)
EOSINOPHIL # BLD AUTO: 0.1 K/UL — SIGNIFICANT CHANGE UP (ref 0–0.5)
EOSINOPHIL NFR BLD AUTO: 1.3 % — SIGNIFICANT CHANGE UP (ref 0–6)
HCT VFR BLD CALC: 34.3 % — LOW (ref 34.5–45)
HGB BLD-MCNC: 11.4 G/DL — LOW (ref 11.5–15.5)
LYMPHOCYTES # BLD AUTO: 1.8 K/UL — SIGNIFICANT CHANGE UP (ref 1–3.3)
LYMPHOCYTES # BLD AUTO: 33.8 % — SIGNIFICANT CHANGE UP (ref 13–44)
MCHC RBC-ENTMCNC: 33.4 G/DL — SIGNIFICANT CHANGE UP (ref 32–36)
MCHC RBC-ENTMCNC: 33.7 PG — SIGNIFICANT CHANGE UP (ref 27–34)
MCV RBC AUTO: 100.8 FL — HIGH (ref 80–100)
MONOCYTES # BLD AUTO: 0.1 K/UL — SIGNIFICANT CHANGE UP (ref 0–0.9)
MONOCYTES NFR BLD AUTO: 2.6 % — SIGNIFICANT CHANGE UP (ref 2–14)
NEUTROPHILS # BLD AUTO: 3.3 K/UL — SIGNIFICANT CHANGE UP (ref 1.8–7.4)
NEUTROPHILS NFR BLD AUTO: 61.1 % — SIGNIFICANT CHANGE UP (ref 43–77)
PLATELET # BLD AUTO: 262 K/UL — SIGNIFICANT CHANGE UP (ref 150–400)
RBC # BLD: 3.4 M/UL — LOW (ref 3.8–5.2)
RBC # FLD: 15.9 % — HIGH (ref 10.3–14.5)
WBC # BLD: 5.4 K/UL — SIGNIFICANT CHANGE UP (ref 3.8–10.5)
WBC # FLD AUTO: 5.4 K/UL — SIGNIFICANT CHANGE UP (ref 3.8–10.5)

## 2024-11-01 PROCEDURE — 99215 OFFICE O/P EST HI 40 MIN: CPT

## 2024-11-01 PROCEDURE — G2211 COMPLEX E/M VISIT ADD ON: CPT

## 2024-11-04 ENCOUNTER — OUTPATIENT (OUTPATIENT)
Dept: OUTPATIENT SERVICES | Facility: HOSPITAL | Age: 79
LOS: 1 days | End: 2024-11-04
Payer: MEDICARE

## 2024-11-04 ENCOUNTER — APPOINTMENT (OUTPATIENT)
Dept: PULMONOLOGY | Facility: CLINIC | Age: 79
End: 2024-11-04
Payer: MEDICARE

## 2024-11-04 ENCOUNTER — APPOINTMENT (OUTPATIENT)
Dept: ULTRASOUND IMAGING | Facility: CLINIC | Age: 79
End: 2024-11-04

## 2024-11-04 ENCOUNTER — APPOINTMENT (OUTPATIENT)
Dept: ULTRASOUND IMAGING | Facility: CLINIC | Age: 79
End: 2024-11-04
Payer: MEDICARE

## 2024-11-04 VITALS
HEART RATE: 86 BPM | OXYGEN SATURATION: 98 % | DIASTOLIC BLOOD PRESSURE: 72 MMHG | RESPIRATION RATE: 16 BRPM | SYSTOLIC BLOOD PRESSURE: 130 MMHG

## 2024-11-04 VITALS — WEIGHT: 116 LBS | HEIGHT: 59 IN | BODY MASS INDEX: 23.39 KG/M2

## 2024-11-04 DIAGNOSIS — Z98.49 CATARACT EXTRACTION STATUS, UNSPECIFIED EYE: Chronic | ICD-10-CM

## 2024-11-04 DIAGNOSIS — R60.9 EDEMA, UNSPECIFIED: ICD-10-CM

## 2024-11-04 DIAGNOSIS — Z98.890 OTHER SPECIFIED POSTPROCEDURAL STATES: Chronic | ICD-10-CM

## 2024-11-04 DIAGNOSIS — C91.10 CHRONIC LYMPHOCYTIC LEUKEMIA OF B-CELL TYPE NOT HAVING ACHIEVED REMISSION: ICD-10-CM

## 2024-11-04 DIAGNOSIS — R91.8 OTHER NONSPECIFIC ABNORMAL FINDING OF LUNG FIELD: ICD-10-CM

## 2024-11-04 DIAGNOSIS — Z90.710 ACQUIRED ABSENCE OF BOTH CERVIX AND UTERUS: Chronic | ICD-10-CM

## 2024-11-04 DIAGNOSIS — C80.1 MALIGNANT (PRIMARY) NEOPLASM, UNSPECIFIED: ICD-10-CM

## 2024-11-04 LAB
ALBUMIN SERPL ELPH-MCNC: 4.2 G/DL
ALP BLD-CCNC: 110 U/L
ALT SERPL-CCNC: 14 U/L
ANION GAP SERPL CALC-SCNC: 9 MMOL/L
AST SERPL-CCNC: 15 U/L
BILIRUB SERPL-MCNC: 0.4 MG/DL
BUN SERPL-MCNC: 15 MG/DL
CALCIUM SERPL-MCNC: 9 MG/DL
CEA SERPL-MCNC: 0.8 NG/ML
CHLORIDE SERPL-SCNC: 106 MMOL/L
CO2 SERPL-SCNC: 27 MMOL/L
CREAT SERPL-MCNC: 0.53 MG/DL
EGFR: 94 ML/MIN/1.73M2
GLUCOSE SERPL-MCNC: 101 MG/DL
POTASSIUM SERPL-SCNC: 4.7 MMOL/L
PROT SERPL-MCNC: 6.1 G/DL
SODIUM SERPL-SCNC: 142 MMOL/L

## 2024-11-04 PROCEDURE — 76882 US LMTD JT/FCL EVL NVASC XTR: CPT | Mod: 26,RT

## 2024-11-04 PROCEDURE — G2211 COMPLEX E/M VISIT ADD ON: CPT

## 2024-11-04 PROCEDURE — 93970 EXTREMITY STUDY: CPT | Mod: 26

## 2024-11-04 PROCEDURE — 99214 OFFICE O/P EST MOD 30 MIN: CPT

## 2024-11-06 ENCOUNTER — OUTPATIENT (OUTPATIENT)
Dept: OUTPATIENT SERVICES | Facility: HOSPITAL | Age: 79
LOS: 1 days | End: 2024-11-06
Payer: MEDICARE

## 2024-11-06 ENCOUNTER — APPOINTMENT (OUTPATIENT)
Dept: CT IMAGING | Facility: CLINIC | Age: 79
End: 2024-11-06

## 2024-11-06 DIAGNOSIS — Z90.710 ACQUIRED ABSENCE OF BOTH CERVIX AND UTERUS: Chronic | ICD-10-CM

## 2024-11-06 DIAGNOSIS — Z98.890 OTHER SPECIFIED POSTPROCEDURAL STATES: Chronic | ICD-10-CM

## 2024-11-06 DIAGNOSIS — R91.8 OTHER NONSPECIFIC ABNORMAL FINDING OF LUNG FIELD: ICD-10-CM

## 2024-11-06 DIAGNOSIS — Z98.49 CATARACT EXTRACTION STATUS, UNSPECIFIED EYE: Chronic | ICD-10-CM

## 2024-11-06 PROCEDURE — 71250 CT THORAX DX C-: CPT | Mod: 26,MH

## 2024-11-06 PROCEDURE — 71250 CT THORAX DX C-: CPT

## 2024-11-10 ENCOUNTER — OUTPATIENT (OUTPATIENT)
Dept: OUTPATIENT SERVICES | Facility: HOSPITAL | Age: 79
LOS: 1 days | End: 2024-11-10
Payer: MEDICARE

## 2024-11-10 DIAGNOSIS — C91.10 CHRONIC LYMPHOCYTIC LEUKEMIA OF B-CELL TYPE NOT HAVING ACHIEVED REMISSION: ICD-10-CM

## 2024-11-10 DIAGNOSIS — Z98.890 OTHER SPECIFIED POSTPROCEDURAL STATES: Chronic | ICD-10-CM

## 2024-11-10 DIAGNOSIS — Z90.710 ACQUIRED ABSENCE OF BOTH CERVIX AND UTERUS: Chronic | ICD-10-CM

## 2024-11-10 DIAGNOSIS — Z98.49 CATARACT EXTRACTION STATUS, UNSPECIFIED EYE: Chronic | ICD-10-CM

## 2024-11-13 ENCOUNTER — NON-APPOINTMENT (OUTPATIENT)
Age: 79
End: 2024-11-13

## 2024-11-20 PROCEDURE — 78815 PET IMAGE W/CT SKULL-THIGH: CPT

## 2024-11-20 PROCEDURE — A9552: CPT

## 2024-11-20 PROCEDURE — 76882 US LMTD JT/FCL EVL NVASC XTR: CPT

## 2024-11-20 PROCEDURE — 93970 EXTREMITY STUDY: CPT

## 2024-11-22 ENCOUNTER — NON-APPOINTMENT (OUTPATIENT)
Age: 79
End: 2024-11-22

## 2025-01-21 NOTE — DISCHARGE NOTE PROVIDER - NSDCACTIVITY_GEN_ALL_CORE
Do not drive or operate machinery/Showering allowed/Stairs allowed/Walking - Indoors allowed/No heavy lifting/straining/Walking - Outdoors allowed/Follow Instructions Provided by your Surgical Team 79

## 2025-02-11 ENCOUNTER — OUTPATIENT (OUTPATIENT)
Dept: OUTPATIENT SERVICES | Facility: HOSPITAL | Age: 80
LOS: 1 days | Discharge: HOME | End: 2025-02-11

## 2025-02-11 DIAGNOSIS — Z98.49 CATARACT EXTRACTION STATUS, UNSPECIFIED EYE: Chronic | ICD-10-CM

## 2025-02-11 DIAGNOSIS — Z98.890 OTHER SPECIFIED POSTPROCEDURAL STATES: Chronic | ICD-10-CM

## 2025-02-11 DIAGNOSIS — C85.10 UNSPECIFIED B-CELL LYMPHOMA, UNSPECIFIED SITE: ICD-10-CM

## 2025-02-11 DIAGNOSIS — Z90.710 ACQUIRED ABSENCE OF BOTH CERVIX AND UTERUS: Chronic | ICD-10-CM

## 2025-02-12 ENCOUNTER — RESULT REVIEW (OUTPATIENT)
Age: 80
End: 2025-02-12

## 2025-02-12 ENCOUNTER — APPOINTMENT (OUTPATIENT)
Dept: HEMATOLOGY ONCOLOGY | Facility: CLINIC | Age: 80
End: 2025-02-12
Payer: MEDICARE

## 2025-02-12 VITALS
SYSTOLIC BLOOD PRESSURE: 130 MMHG | DIASTOLIC BLOOD PRESSURE: 77 MMHG | OXYGEN SATURATION: 97 % | BODY MASS INDEX: 23.47 KG/M2 | HEART RATE: 90 BPM | WEIGHT: 116.4 LBS | HEIGHT: 59 IN

## 2025-02-12 DIAGNOSIS — C50.911 MALIGNANT NEOPLASM OF UNSPECIFIED SITE OF RIGHT FEMALE BREAST: ICD-10-CM

## 2025-02-12 DIAGNOSIS — C80.1 MALIGNANT (PRIMARY) NEOPLASM, UNSPECIFIED: ICD-10-CM

## 2025-02-12 DIAGNOSIS — C83.00 SMALL CELL B-CELL LYMPHOMA, UNSPECIFIED SITE: ICD-10-CM

## 2025-02-12 LAB
BASOPHILS # BLD AUTO: 0.03 K/UL — SIGNIFICANT CHANGE UP (ref 0–0.2)
BASOPHILS NFR BLD AUTO: 0.6 % — SIGNIFICANT CHANGE UP (ref 0–2)
EOSINOPHIL # BLD AUTO: 0.05 K/UL — SIGNIFICANT CHANGE UP (ref 0–0.5)
EOSINOPHIL NFR BLD AUTO: 0.9 % — SIGNIFICANT CHANGE UP (ref 0–6)
HCT VFR BLD CALC: 31 % — LOW (ref 34.5–45)
HGB BLD-MCNC: 10.5 G/DL — LOW (ref 11.5–15.5)
IMM GRANULOCYTES # BLD AUTO: 0.02 K/UL — SIGNIFICANT CHANGE UP (ref 0–0.07)
IMM GRANULOCYTES NFR BLD AUTO: 0.4 % — SIGNIFICANT CHANGE UP (ref 0–0.9)
LYMPHOCYTES # BLD AUTO: 1.95 K/UL — SIGNIFICANT CHANGE UP (ref 1–3.3)
LYMPHOCYTES NFR BLD AUTO: 36.2 % — SIGNIFICANT CHANGE UP (ref 13–44)
MCHC RBC-ENTMCNC: 33 PG — SIGNIFICANT CHANGE UP (ref 27–34)
MCHC RBC-ENTMCNC: 33.9 G/DL — SIGNIFICANT CHANGE UP (ref 32–36)
MCV RBC AUTO: 97.5 FL — SIGNIFICANT CHANGE UP (ref 80–100)
MONOCYTES # BLD AUTO: 0.11 K/UL — SIGNIFICANT CHANGE UP (ref 0–0.9)
MONOCYTES NFR BLD AUTO: 2 % — SIGNIFICANT CHANGE UP (ref 2–14)
NEUTROPHILS # BLD AUTO: 3.23 K/UL — SIGNIFICANT CHANGE UP (ref 1.8–7.4)
NEUTROPHILS NFR BLD AUTO: 59.9 % — SIGNIFICANT CHANGE UP (ref 43–77)
NRBC # BLD AUTO: 0 K/UL — SIGNIFICANT CHANGE UP (ref 0–0)
NRBC # FLD: 0 K/UL — SIGNIFICANT CHANGE UP (ref 0–0)
NRBC BLD AUTO-RTO: 0 /100 WBCS — SIGNIFICANT CHANGE UP (ref 0–0)
PLATELET # BLD AUTO: 226 K/UL — SIGNIFICANT CHANGE UP (ref 150–400)
PMV BLD: 9.6 FL — SIGNIFICANT CHANGE UP (ref 7–13)
RBC # BLD: 3.18 M/UL — LOW (ref 3.8–5.2)
RBC # FLD: 19.9 % — HIGH (ref 10.3–14.5)
WBC # BLD: 5.39 K/UL — SIGNIFICANT CHANGE UP (ref 3.8–10.5)
WBC # FLD AUTO: 5.39 K/UL — SIGNIFICANT CHANGE UP (ref 3.8–10.5)

## 2025-02-12 PROCEDURE — 99215 OFFICE O/P EST HI 40 MIN: CPT

## 2025-02-13 DIAGNOSIS — C91.10 CHRONIC LYMPHOCYTIC LEUKEMIA OF B-CELL TYPE NOT HAVING ACHIEVED REMISSION: ICD-10-CM

## 2025-02-13 DIAGNOSIS — D59.10 AUTOIMMUNE HEMOLYTIC ANEMIA, UNSPECIFIED: ICD-10-CM

## 2025-02-13 LAB
ALBUMIN SERPL ELPH-MCNC: 4.2 G/DL
ALP BLD-CCNC: 99 U/L
ALT SERPL-CCNC: 27 U/L
ANION GAP SERPL CALC-SCNC: 10 MMOL/L
AST SERPL-CCNC: 34 U/L
BILIRUB SERPL-MCNC: 1.5 MG/DL
BUN SERPL-MCNC: 17 MG/DL
CALCIUM SERPL-MCNC: 9.4 MG/DL
CHLORIDE SERPL-SCNC: 103 MMOL/L
CO2 SERPL-SCNC: 28 MMOL/L
CREAT SERPL-MCNC: 0.59 MG/DL
EGFR: 92 ML/MIN/1.73M2
FERRITIN SERPL-MCNC: 91 NG/ML
GLUCOSE SERPL-MCNC: 62 MG/DL
HAPTOGLOB SERPL-MCNC: <20 MG/DL
LDH SERPL-CCNC: 329 U/L
POTASSIUM SERPL-SCNC: 4.1 MMOL/L
PROT SERPL-MCNC: 5.8 G/DL
SODIUM SERPL-SCNC: 140 MMOL/L
URATE SERPL-MCNC: 3.5 MG/DL

## 2025-02-14 ENCOUNTER — LABORATORY RESULT (OUTPATIENT)
Age: 80
End: 2025-02-14

## 2025-02-14 ENCOUNTER — APPOINTMENT (OUTPATIENT)
Dept: HEMATOLOGY ONCOLOGY | Facility: CLINIC | Age: 80
End: 2025-02-14

## 2025-02-14 ENCOUNTER — RESULT REVIEW (OUTPATIENT)
Age: 80
End: 2025-02-14

## 2025-02-14 LAB
BASOPHILS # BLD AUTO: 0.03 K/UL — SIGNIFICANT CHANGE UP (ref 0–0.2)
BASOPHILS NFR BLD AUTO: 0.6 % — SIGNIFICANT CHANGE UP (ref 0–2)
EOSINOPHIL # BLD AUTO: 0.1 K/UL — SIGNIFICANT CHANGE UP (ref 0–0.5)
EOSINOPHIL NFR BLD AUTO: 2.1 % — SIGNIFICANT CHANGE UP (ref 0–6)
HCT VFR BLD CALC: 29.6 % — LOW (ref 34.5–45)
HGB BLD-MCNC: 9.8 G/DL — LOW (ref 11.5–15.5)
IMM GRANULOCYTES # BLD AUTO: 0.02 K/UL — SIGNIFICANT CHANGE UP (ref 0–0.07)
IMM GRANULOCYTES NFR BLD AUTO: 0.4 % — SIGNIFICANT CHANGE UP (ref 0–0.9)
LYMPHOCYTES # BLD AUTO: 1.82 K/UL — SIGNIFICANT CHANGE UP (ref 1–3.3)
LYMPHOCYTES NFR BLD AUTO: 37.6 % — SIGNIFICANT CHANGE UP (ref 13–44)
MCHC RBC-ENTMCNC: 32.8 PG — SIGNIFICANT CHANGE UP (ref 27–34)
MCHC RBC-ENTMCNC: 33.1 G/DL — SIGNIFICANT CHANGE UP (ref 32–36)
MCV RBC AUTO: 99 FL — SIGNIFICANT CHANGE UP (ref 80–100)
MONOCYTES # BLD AUTO: 0.14 K/UL — SIGNIFICANT CHANGE UP (ref 0–0.9)
MONOCYTES NFR BLD AUTO: 2.9 % — SIGNIFICANT CHANGE UP (ref 2–14)
NEUTROPHILS # BLD AUTO: 2.73 K/UL — SIGNIFICANT CHANGE UP (ref 1.8–7.4)
NEUTROPHILS NFR BLD AUTO: 56.4 % — SIGNIFICANT CHANGE UP (ref 43–77)
NRBC # BLD AUTO: 0 K/UL — SIGNIFICANT CHANGE UP (ref 0–0)
NRBC # FLD: 0 K/UL — SIGNIFICANT CHANGE UP (ref 0–0)
NRBC BLD AUTO-RTO: 0 /100 WBCS — SIGNIFICANT CHANGE UP (ref 0–0)
PLATELET # BLD AUTO: 236 K/UL — SIGNIFICANT CHANGE UP (ref 150–400)
PMV BLD: 9.9 FL — SIGNIFICANT CHANGE UP (ref 7–13)
RBC # BLD: 2.99 M/UL — LOW (ref 3.8–5.2)
RBC # FLD: 19.7 % — HIGH (ref 10.3–14.5)
WBC # BLD: 4.84 K/UL — SIGNIFICANT CHANGE UP (ref 3.8–10.5)
WBC # FLD AUTO: 4.84 K/UL — SIGNIFICANT CHANGE UP (ref 3.8–10.5)

## 2025-02-18 ENCOUNTER — LABORATORY RESULT (OUTPATIENT)
Age: 80
End: 2025-02-18

## 2025-02-18 ENCOUNTER — RESULT REVIEW (OUTPATIENT)
Age: 80
End: 2025-02-18

## 2025-02-18 ENCOUNTER — APPOINTMENT (OUTPATIENT)
Dept: HEMATOLOGY ONCOLOGY | Facility: CLINIC | Age: 80
End: 2025-02-18

## 2025-02-18 LAB
BASOPHILS # BLD AUTO: 0.03 K/UL — SIGNIFICANT CHANGE UP (ref 0–0.2)
BASOPHILS NFR BLD AUTO: 0.6 % — SIGNIFICANT CHANGE UP (ref 0–2)
EOSINOPHIL # BLD AUTO: 0.07 K/UL — SIGNIFICANT CHANGE UP (ref 0–0.5)
EOSINOPHIL NFR BLD AUTO: 1.4 % — SIGNIFICANT CHANGE UP (ref 0–6)
HCT VFR BLD CALC: 32.8 % — LOW (ref 34.5–45)
HGB BLD-MCNC: 11 G/DL — LOW (ref 11.5–15.5)
IMM GRANULOCYTES # BLD AUTO: 0.01 K/UL — SIGNIFICANT CHANGE UP (ref 0–0.07)
IMM GRANULOCYTES NFR BLD AUTO: 0.2 % — SIGNIFICANT CHANGE UP (ref 0–0.9)
LYMPHOCYTES # BLD AUTO: 2.02 K/UL — SIGNIFICANT CHANGE UP (ref 1–3.3)
LYMPHOCYTES NFR BLD AUTO: 41 % — SIGNIFICANT CHANGE UP (ref 13–44)
MCHC RBC-ENTMCNC: 33.3 PG — SIGNIFICANT CHANGE UP (ref 27–34)
MCHC RBC-ENTMCNC: 33.5 G/DL — SIGNIFICANT CHANGE UP (ref 32–36)
MCV RBC AUTO: 99.4 FL — SIGNIFICANT CHANGE UP (ref 80–100)
MONOCYTES # BLD AUTO: 0.13 K/UL — SIGNIFICANT CHANGE UP (ref 0–0.9)
MONOCYTES NFR BLD AUTO: 2.6 % — SIGNIFICANT CHANGE UP (ref 2–14)
NEUTROPHILS # BLD AUTO: 2.67 K/UL — SIGNIFICANT CHANGE UP (ref 1.8–7.4)
NEUTROPHILS NFR BLD AUTO: 54.2 % — SIGNIFICANT CHANGE UP (ref 43–77)
NRBC # BLD AUTO: 0 K/UL — SIGNIFICANT CHANGE UP (ref 0–0)
NRBC # FLD: 0 K/UL — SIGNIFICANT CHANGE UP (ref 0–0)
NRBC BLD AUTO-RTO: 0 /100 WBCS — SIGNIFICANT CHANGE UP (ref 0–0)
PLATELET # BLD AUTO: 289 K/UL — SIGNIFICANT CHANGE UP (ref 150–400)
PMV BLD: 9.6 FL — SIGNIFICANT CHANGE UP (ref 7–13)
RBC # BLD: 3.3 M/UL — LOW (ref 3.8–5.2)
RBC # FLD: 19.8 % — HIGH (ref 10.3–14.5)
WBC # BLD: 4.93 K/UL — SIGNIFICANT CHANGE UP (ref 3.8–10.5)
WBC # FLD AUTO: 4.93 K/UL — SIGNIFICANT CHANGE UP (ref 3.8–10.5)

## 2025-02-19 LAB
ALBUMIN SERPL ELPH-MCNC: 4.2 G/DL
ALP BLD-CCNC: 98 U/L
ALT SERPL-CCNC: 19 U/L
ANION GAP SERPL CALC-SCNC: 10 MMOL/L
AST SERPL-CCNC: 20 U/L
BILIRUB SERPL-MCNC: 0.8 MG/DL
BUN SERPL-MCNC: 20 MG/DL
CALCIUM SERPL-MCNC: 9.3 MG/DL
CHLORIDE SERPL-SCNC: 102 MMOL/L
CO2 SERPL-SCNC: 27 MMOL/L
CREAT SERPL-MCNC: 0.55 MG/DL
EGFR: 93 ML/MIN/1.73M2
GLUCOSE SERPL-MCNC: 127 MG/DL
LDH SERPL-CCNC: 269 U/L
M PROTEIN SPEC IFE-MCNC: NORMAL
POTASSIUM SERPL-SCNC: 4.7 MMOL/L
PROT SERPL-MCNC: 5.8 G/DL
SODIUM SERPL-SCNC: 138 MMOL/L

## 2025-02-21 LAB
ALBUMIN SERPL ELPH-MCNC: 4 G/DL
ALP BLD-CCNC: 95 U/L
ALT SERPL-CCNC: 22 U/L
ANION GAP SERPL CALC-SCNC: 9 MMOL/L
AST SERPL-CCNC: 29 U/L
BILIRUB INDIRECT SERPL-MCNC: 0.5 MG/DL
BILIRUB INDIRECT SERPL-MCNC: 0.6 MG/DL
BILIRUB SERPL-MCNC: 0.7 MG/DL
BUN SERPL-MCNC: 14 MG/DL
CALCIUM SERPL-MCNC: 8.8 MG/DL
CHLORIDE SERPL-SCNC: 105 MMOL/L
CO2 SERPL-SCNC: 27 MMOL/L
CREAT SERPL-MCNC: 0.56 MG/DL
EGFR: 93 ML/MIN/1.73M2
GLUCOSE SERPL-MCNC: 82 MG/DL
HAPTOGLOB SERPL-MCNC: <20 MG/DL
HAPTOGLOB SERPL-MCNC: <20 MG/DL
LDH SERPL-CCNC: 312 U/L
POTASSIUM SERPL-SCNC: 4.8 MMOL/L
PROT SERPL-MCNC: 5.7 G/DL
SODIUM SERPL-SCNC: 141 MMOL/L

## 2025-03-06 ENCOUNTER — RESULT REVIEW (OUTPATIENT)
Age: 80
End: 2025-03-06

## 2025-03-07 ENCOUNTER — RESULT REVIEW (OUTPATIENT)
Age: 80
End: 2025-03-07

## 2025-03-07 ENCOUNTER — APPOINTMENT (OUTPATIENT)
Dept: HEMATOLOGY ONCOLOGY | Facility: CLINIC | Age: 80
End: 2025-03-07

## 2025-03-07 LAB
BASOPHILS # BLD AUTO: 0.03 K/UL — SIGNIFICANT CHANGE UP (ref 0–0.2)
BASOPHILS NFR BLD AUTO: 0.5 % — SIGNIFICANT CHANGE UP (ref 0–2)
EOSINOPHIL # BLD AUTO: 0.05 K/UL — SIGNIFICANT CHANGE UP (ref 0–0.5)
EOSINOPHIL NFR BLD AUTO: 0.9 % — SIGNIFICANT CHANGE UP (ref 0–6)
HCT VFR BLD CALC: 32.3 % — LOW (ref 34.5–45)
HGB BLD-MCNC: 10.9 G/DL — LOW (ref 11.5–15.5)
IMM GRANULOCYTES # BLD AUTO: 0.02 K/UL — SIGNIFICANT CHANGE UP (ref 0–0.07)
IMM GRANULOCYTES NFR BLD AUTO: 0.3 % — SIGNIFICANT CHANGE UP (ref 0–0.9)
LYMPHOCYTES # BLD AUTO: 2.64 K/UL — SIGNIFICANT CHANGE UP (ref 1–3.3)
LYMPHOCYTES NFR BLD AUTO: 45.1 % — HIGH (ref 13–44)
MCHC RBC-ENTMCNC: 33.4 PG — SIGNIFICANT CHANGE UP (ref 27–34)
MCHC RBC-ENTMCNC: 33.7 G/DL — SIGNIFICANT CHANGE UP (ref 32–36)
MCV RBC AUTO: 99.1 FL — SIGNIFICANT CHANGE UP (ref 80–100)
MONOCYTES # BLD AUTO: 0.17 K/UL — SIGNIFICANT CHANGE UP (ref 0–0.9)
MONOCYTES NFR BLD AUTO: 2.9 % — SIGNIFICANT CHANGE UP (ref 2–14)
NEUTROPHILS # BLD AUTO: 2.94 K/UL — SIGNIFICANT CHANGE UP (ref 1.8–7.4)
NEUTROPHILS NFR BLD AUTO: 50.3 % — SIGNIFICANT CHANGE UP (ref 43–77)
NRBC # BLD AUTO: 0 K/UL — SIGNIFICANT CHANGE UP (ref 0–0)
NRBC # FLD: 0 K/UL — SIGNIFICANT CHANGE UP (ref 0–0)
NRBC BLD AUTO-RTO: 0 /100 WBCS — SIGNIFICANT CHANGE UP (ref 0–0)
PLATELET # BLD AUTO: 191 K/UL — SIGNIFICANT CHANGE UP (ref 150–400)
PMV BLD: 9.4 FL — SIGNIFICANT CHANGE UP (ref 7–13)
RBC # BLD: 3.26 M/UL — LOW (ref 3.8–5.2)
RBC # FLD: 17 % — HIGH (ref 10.3–14.5)
WBC # BLD: 5.85 K/UL — SIGNIFICANT CHANGE UP (ref 3.8–10.5)
WBC # FLD AUTO: 5.85 K/UL — SIGNIFICANT CHANGE UP (ref 3.8–10.5)

## 2025-03-10 LAB
ALBUMIN SERPL ELPH-MCNC: 4.2 G/DL
ALP BLD-CCNC: 78 U/L
ALT SERPL-CCNC: 15 U/L
ANION GAP SERPL CALC-SCNC: 10 MMOL/L
AST SERPL-CCNC: 16 U/L
BILIRUB SERPL-MCNC: 0.8 MG/DL
BUN SERPL-MCNC: 22 MG/DL
CALCIUM SERPL-MCNC: 9 MG/DL
CHLORIDE SERPL-SCNC: 107 MMOL/L
CO2 SERPL-SCNC: 26 MMOL/L
CREAT SERPL-MCNC: 0.49 MG/DL
EGFRCR SERPLBLD CKD-EPI 2021: 96 ML/MIN/1.73M2
GLUCOSE SERPL-MCNC: 90 MG/DL
HAPTOGLOB SERPL-MCNC: <20 MG/DL
LDH SERPL-CCNC: 240 U/L
POTASSIUM SERPL-SCNC: 4.5 MMOL/L
PROT SERPL-MCNC: 5.6 G/DL
SODIUM SERPL-SCNC: 143 MMOL/L

## 2025-03-11 ENCOUNTER — NON-APPOINTMENT (OUTPATIENT)
Age: 80
End: 2025-03-11

## 2025-03-12 LAB — HEMATOPATHOLOGY REPORT: SIGNIFICANT CHANGE UP

## 2025-03-18 ENCOUNTER — APPOINTMENT (OUTPATIENT)
Dept: VASCULAR SURGERY | Facility: CLINIC | Age: 80
End: 2025-03-18
Payer: MEDICARE

## 2025-03-18 VITALS
DIASTOLIC BLOOD PRESSURE: 73 MMHG | OXYGEN SATURATION: 96 % | SYSTOLIC BLOOD PRESSURE: 137 MMHG | BODY MASS INDEX: 21.77 KG/M2 | WEIGHT: 108 LBS | HEART RATE: 90 BPM | TEMPERATURE: 98 F | HEIGHT: 59 IN | RESPIRATION RATE: 16 BRPM

## 2025-03-18 PROBLEM — I83.019 VENOUS STASIS ULCERS OF BOTH LOWER EXTREMITIES: Status: ACTIVE | Noted: 2025-03-18

## 2025-03-18 PROCEDURE — 93970 EXTREMITY STUDY: CPT

## 2025-03-18 PROCEDURE — 99213 OFFICE O/P EST LOW 20 MIN: CPT | Mod: 25

## 2025-03-18 PROCEDURE — 29580 STRAPPING UNNA BOOT: CPT | Mod: 50

## 2025-03-18 RX ORDER — TRAMADOL HYDROCHLORIDE 50 MG/1
50 TABLET, COATED ORAL 3 TIMES DAILY
Qty: 9 | Refills: 0 | Status: ACTIVE | COMMUNITY
Start: 2025-03-18 | End: 1900-01-01

## 2025-03-21 ENCOUNTER — APPOINTMENT (OUTPATIENT)
Dept: VASCULAR SURGERY | Facility: CLINIC | Age: 80
End: 2025-03-21
Payer: MEDICARE

## 2025-03-21 VITALS
RESPIRATION RATE: 16 BRPM | TEMPERATURE: 97.6 F | SYSTOLIC BLOOD PRESSURE: 148 MMHG | OXYGEN SATURATION: 99 % | HEART RATE: 100 BPM | HEIGHT: 59 IN | DIASTOLIC BLOOD PRESSURE: 81 MMHG | BODY MASS INDEX: 22.58 KG/M2 | WEIGHT: 112 LBS

## 2025-03-21 VITALS — SYSTOLIC BLOOD PRESSURE: 145 MMHG | DIASTOLIC BLOOD PRESSURE: 87 MMHG

## 2025-03-21 PROCEDURE — 29580 STRAPPING UNNA BOOT: CPT | Mod: 50

## 2025-03-25 ENCOUNTER — APPOINTMENT (OUTPATIENT)
Dept: VASCULAR SURGERY | Facility: CLINIC | Age: 80
End: 2025-03-25
Payer: MEDICARE

## 2025-03-25 VITALS
WEIGHT: 113 LBS | HEIGHT: 59 IN | BODY MASS INDEX: 22.78 KG/M2 | TEMPERATURE: 98.2 F | HEART RATE: 96 BPM | OXYGEN SATURATION: 98 % | DIASTOLIC BLOOD PRESSURE: 77 MMHG | SYSTOLIC BLOOD PRESSURE: 125 MMHG | RESPIRATION RATE: 14 BRPM

## 2025-03-25 DIAGNOSIS — I83.019 VARICOSE VEINS OF RIGHT LOWER EXTREMITY WITH ULCER OF UNSPECIFIED SITE: ICD-10-CM

## 2025-03-25 DIAGNOSIS — I83.029 VARICOSE VEINS OF RIGHT LOWER EXTREMITY WITH ULCER OF UNSPECIFIED SITE: ICD-10-CM

## 2025-03-25 DIAGNOSIS — L97.919 VARICOSE VEINS OF RIGHT LOWER EXTREMITY WITH ULCER OF UNSPECIFIED SITE: ICD-10-CM

## 2025-03-25 DIAGNOSIS — L97.929 VARICOSE VEINS OF RIGHT LOWER EXTREMITY WITH ULCER OF UNSPECIFIED SITE: ICD-10-CM

## 2025-03-25 PROCEDURE — 29580 STRAPPING UNNA BOOT: CPT | Mod: 50

## 2025-04-08 ENCOUNTER — APPOINTMENT (OUTPATIENT)
Dept: VASCULAR SURGERY | Facility: CLINIC | Age: 80
End: 2025-04-08

## 2025-04-12 ENCOUNTER — NON-APPOINTMENT (OUTPATIENT)
Age: 80
End: 2025-04-12

## 2025-04-28 ENCOUNTER — APPOINTMENT (OUTPATIENT)
Dept: NUCLEAR MEDICINE | Facility: CLINIC | Age: 80
End: 2025-04-28
Payer: MEDICARE

## 2025-04-28 ENCOUNTER — OUTPATIENT (OUTPATIENT)
Dept: OUTPATIENT SERVICES | Facility: HOSPITAL | Age: 80
LOS: 1 days | End: 2025-04-28

## 2025-04-28 DIAGNOSIS — Z98.49 CATARACT EXTRACTION STATUS, UNSPECIFIED EYE: Chronic | ICD-10-CM

## 2025-04-28 DIAGNOSIS — Z98.890 OTHER SPECIFIED POSTPROCEDURAL STATES: Chronic | ICD-10-CM

## 2025-04-28 DIAGNOSIS — Z90.710 ACQUIRED ABSENCE OF BOTH CERVIX AND UTERUS: Chronic | ICD-10-CM

## 2025-04-28 DIAGNOSIS — C91.10 CHRONIC LYMPHOCYTIC LEUKEMIA OF B-CELL TYPE NOT HAVING ACHIEVED REMISSION: ICD-10-CM

## 2025-04-28 PROCEDURE — 78815 PET IMAGE W/CT SKULL-THIGH: CPT | Mod: 26,PI

## 2025-04-30 ENCOUNTER — OUTPATIENT (OUTPATIENT)
Dept: OUTPATIENT SERVICES | Facility: HOSPITAL | Age: 80
LOS: 1 days | Discharge: HOME | End: 2025-04-30

## 2025-04-30 DIAGNOSIS — C85.10 UNSPECIFIED B-CELL LYMPHOMA, UNSPECIFIED SITE: ICD-10-CM

## 2025-04-30 DIAGNOSIS — Z98.890 OTHER SPECIFIED POSTPROCEDURAL STATES: Chronic | ICD-10-CM

## 2025-04-30 DIAGNOSIS — Z98.49 CATARACT EXTRACTION STATUS, UNSPECIFIED EYE: Chronic | ICD-10-CM

## 2025-04-30 DIAGNOSIS — Z90.710 ACQUIRED ABSENCE OF BOTH CERVIX AND UTERUS: Chronic | ICD-10-CM

## 2025-05-03 ENCOUNTER — INPATIENT (INPATIENT)
Facility: HOSPITAL | Age: 80
LOS: 3 days | Discharge: HOME HEALTH | DRG: 871 | End: 2025-05-07
Attending: STUDENT IN AN ORGANIZED HEALTH CARE EDUCATION/TRAINING PROGRAM | Admitting: HOSPITALIST
Payer: MEDICARE

## 2025-05-03 VITALS
HEIGHT: 61 IN | OXYGEN SATURATION: 90 % | DIASTOLIC BLOOD PRESSURE: 75 MMHG | SYSTOLIC BLOOD PRESSURE: 126 MMHG | TEMPERATURE: 98 F | HEART RATE: 129 BPM | WEIGHT: 107.37 LBS | RESPIRATION RATE: 22 BRPM

## 2025-05-03 DIAGNOSIS — Z98.890 OTHER SPECIFIED POSTPROCEDURAL STATES: Chronic | ICD-10-CM

## 2025-05-03 DIAGNOSIS — J18.9 PNEUMONIA, UNSPECIFIED ORGANISM: ICD-10-CM

## 2025-05-03 DIAGNOSIS — Z90.710 ACQUIRED ABSENCE OF BOTH CERVIX AND UTERUS: Chronic | ICD-10-CM

## 2025-05-03 DIAGNOSIS — Z98.49 CATARACT EXTRACTION STATUS, UNSPECIFIED EYE: Chronic | ICD-10-CM

## 2025-05-03 LAB
ALBUMIN SERPL ELPH-MCNC: 3.1 G/DL — LOW (ref 3.3–5.2)
ALP SERPL-CCNC: 144 U/L — HIGH (ref 40–120)
ALT FLD-CCNC: 44 U/L — HIGH
ANION GAP SERPL CALC-SCNC: 13 MMOL/L — SIGNIFICANT CHANGE UP (ref 5–17)
APPEARANCE UR: CLEAR — SIGNIFICANT CHANGE UP
APTT BLD: 28.8 SEC — SIGNIFICANT CHANGE UP (ref 26.1–36.8)
AST SERPL-CCNC: 25 U/L — SIGNIFICANT CHANGE UP
BASOPHILS # BLD AUTO: 0.02 K/UL — SIGNIFICANT CHANGE UP (ref 0–0.2)
BASOPHILS NFR BLD AUTO: 0.2 % — SIGNIFICANT CHANGE UP (ref 0–2)
BILIRUB SERPL-MCNC: 2.1 MG/DL — HIGH (ref 0.4–2)
BILIRUB UR-MCNC: NEGATIVE — SIGNIFICANT CHANGE UP
BUN SERPL-MCNC: 15.7 MG/DL — SIGNIFICANT CHANGE UP (ref 8–20)
CALCIUM SERPL-MCNC: 8.6 MG/DL — SIGNIFICANT CHANGE UP (ref 8.4–10.5)
CHLORIDE SERPL-SCNC: 91 MMOL/L — LOW (ref 96–108)
CO2 SERPL-SCNC: 26 MMOL/L — SIGNIFICANT CHANGE UP (ref 22–29)
COLOR SPEC: YELLOW — SIGNIFICANT CHANGE UP
CREAT SERPL-MCNC: 0.38 MG/DL — LOW (ref 0.5–1.3)
DIFF PNL FLD: ABNORMAL
EGFR: 102 ML/MIN/1.73M2 — SIGNIFICANT CHANGE UP
EGFR: 102 ML/MIN/1.73M2 — SIGNIFICANT CHANGE UP
EOSINOPHIL # BLD AUTO: 0.01 K/UL — SIGNIFICANT CHANGE UP (ref 0–0.5)
EOSINOPHIL NFR BLD AUTO: 0.1 % — SIGNIFICANT CHANGE UP (ref 0–6)
GLUCOSE SERPL-MCNC: 107 MG/DL — HIGH (ref 70–99)
GLUCOSE UR QL: NEGATIVE MG/DL — SIGNIFICANT CHANGE UP
HCT VFR BLD CALC: 28.8 % — LOW (ref 34.5–45)
HGB BLD-MCNC: 10 G/DL — LOW (ref 11.5–15.5)
IMM GRANULOCYTES # BLD AUTO: 0.09 K/UL — HIGH (ref 0–0.07)
IMM GRANULOCYTES NFR BLD AUTO: 0.8 % — SIGNIFICANT CHANGE UP (ref 0–0.9)
INR BLD: 1.25 RATIO — HIGH (ref 0.85–1.16)
KETONES UR-MCNC: NEGATIVE MG/DL — SIGNIFICANT CHANGE UP
LACTATE SERPL-SCNC: 1.3 MMOL/L — SIGNIFICANT CHANGE UP (ref 0.5–2)
LEUKOCYTE ESTERASE UR-ACNC: ABNORMAL
LYMPHOCYTES # BLD AUTO: 0.5 K/UL — LOW (ref 1–3.3)
LYMPHOCYTES NFR BLD AUTO: 4.4 % — LOW (ref 13–44)
MCHC RBC-ENTMCNC: 32.1 PG — SIGNIFICANT CHANGE UP (ref 27–34)
MCHC RBC-ENTMCNC: 34.7 G/DL — SIGNIFICANT CHANGE UP (ref 32–36)
MCV RBC AUTO: 92.3 FL — SIGNIFICANT CHANGE UP (ref 80–100)
MONOCYTES # BLD AUTO: 0.08 K/UL — SIGNIFICANT CHANGE UP (ref 0–0.9)
MONOCYTES NFR BLD AUTO: 0.7 % — LOW (ref 2–14)
NEUTROPHILS # BLD AUTO: 10.58 K/UL — HIGH (ref 1.8–7.4)
NEUTROPHILS NFR BLD AUTO: 93.8 % — HIGH (ref 43–77)
NITRITE UR-MCNC: NEGATIVE — SIGNIFICANT CHANGE UP
NRBC # BLD AUTO: 0 K/UL — SIGNIFICANT CHANGE UP (ref 0–0)
NRBC # FLD: 0 K/UL — SIGNIFICANT CHANGE UP (ref 0–0)
NRBC BLD AUTO-RTO: 0 /100 WBCS — SIGNIFICANT CHANGE UP (ref 0–0)
PH UR: 7 — SIGNIFICANT CHANGE UP (ref 5–8)
PLATELET # BLD AUTO: 585 K/UL — HIGH (ref 150–400)
PMV BLD: 9.6 FL — SIGNIFICANT CHANGE UP (ref 7–13)
POTASSIUM SERPL-MCNC: 3.9 MMOL/L — SIGNIFICANT CHANGE UP (ref 3.5–5.3)
POTASSIUM SERPL-SCNC: 3.9 MMOL/L — SIGNIFICANT CHANGE UP (ref 3.5–5.3)
PROT SERPL-MCNC: 5.9 G/DL — LOW (ref 6.6–8.7)
PROT UR-MCNC: NEGATIVE MG/DL — SIGNIFICANT CHANGE UP
PROTHROM AB SERPL-ACNC: 14.5 SEC — HIGH (ref 9.9–13.4)
RBC # BLD: 3.12 M/UL — LOW (ref 3.8–5.2)
RBC # FLD: 13.3 % — SIGNIFICANT CHANGE UP (ref 10.3–14.5)
SODIUM SERPL-SCNC: 130 MMOL/L — LOW (ref 135–145)
SP GR SPEC: 1.01 — SIGNIFICANT CHANGE UP (ref 1–1.03)
UROBILINOGEN FLD QL: 1 MG/DL — SIGNIFICANT CHANGE UP (ref 0.2–1)
WBC # BLD: 11.28 K/UL — HIGH (ref 3.8–10.5)
WBC # FLD AUTO: 11.28 K/UL — HIGH (ref 3.8–10.5)

## 2025-05-03 PROCEDURE — 99285 EMERGENCY DEPT VISIT HI MDM: CPT

## 2025-05-03 PROCEDURE — 99223 1ST HOSP IP/OBS HIGH 75: CPT

## 2025-05-03 PROCEDURE — 71275 CT ANGIOGRAPHY CHEST: CPT | Mod: 26

## 2025-05-03 RX ORDER — MAGNESIUM, ALUMINUM HYDROXIDE 200-200 MG
30 TABLET,CHEWABLE ORAL EVERY 4 HOURS
Refills: 0 | Status: DISCONTINUED | OUTPATIENT
Start: 2025-05-03 | End: 2025-05-07

## 2025-05-03 RX ORDER — ACETAMINOPHEN 500 MG/5ML
650 LIQUID (ML) ORAL EVERY 6 HOURS
Refills: 0 | Status: DISCONTINUED | OUTPATIENT
Start: 2025-05-03 | End: 2025-05-07

## 2025-05-03 RX ORDER — CEFTRIAXONE 500 MG/1
1000 INJECTION, POWDER, FOR SOLUTION INTRAMUSCULAR; INTRAVENOUS EVERY 24 HOURS
Refills: 0 | Status: DISCONTINUED | OUTPATIENT
Start: 2025-05-03 | End: 2025-05-03

## 2025-05-03 RX ORDER — DOXYCYCLINE HYCLATE 100 MG
100 TABLET ORAL ONCE
Refills: 0 | Status: COMPLETED | OUTPATIENT
Start: 2025-05-03 | End: 2025-05-03

## 2025-05-03 RX ORDER — CEFTRIAXONE 500 MG/1
1000 INJECTION, POWDER, FOR SOLUTION INTRAMUSCULAR; INTRAVENOUS EVERY 24 HOURS
Refills: 0 | Status: DISCONTINUED | OUTPATIENT
Start: 2025-05-03 | End: 2025-05-07

## 2025-05-03 RX ORDER — HEPARIN SODIUM 1000 [USP'U]/ML
5000 INJECTION INTRAVENOUS; SUBCUTANEOUS EVERY 8 HOURS
Refills: 0 | Status: DISCONTINUED | OUTPATIENT
Start: 2025-05-03 | End: 2025-05-07

## 2025-05-03 RX ORDER — CEFTRIAXONE 500 MG/1
1000 INJECTION, POWDER, FOR SOLUTION INTRAMUSCULAR; INTRAVENOUS ONCE
Refills: 0 | Status: COMPLETED | OUTPATIENT
Start: 2025-05-03 | End: 2025-05-03

## 2025-05-03 RX ORDER — ACETAMINOPHEN 500 MG/5ML
725 LIQUID (ML) ORAL ONCE
Refills: 0 | Status: COMPLETED | OUTPATIENT
Start: 2025-05-03 | End: 2025-05-03

## 2025-05-03 RX ORDER — ONDANSETRON HCL/PF 4 MG/2 ML
4 VIAL (ML) INJECTION EVERY 8 HOURS
Refills: 0 | Status: DISCONTINUED | OUTPATIENT
Start: 2025-05-03 | End: 2025-05-07

## 2025-05-03 RX ORDER — MELATONIN 5 MG
3 TABLET ORAL AT BEDTIME
Refills: 0 | Status: DISCONTINUED | OUTPATIENT
Start: 2025-05-03 | End: 2025-05-07

## 2025-05-03 RX ADMIN — CEFTRIAXONE 1000 MILLIGRAM(S): 500 INJECTION, POWDER, FOR SOLUTION INTRAMUSCULAR; INTRAVENOUS at 14:52

## 2025-05-03 RX ADMIN — Medication 290 MILLIGRAM(S): at 15:28

## 2025-05-03 RX ADMIN — Medication 1500 MILLILITER(S): at 15:30

## 2025-05-03 RX ADMIN — Medication 1500 MILLILITER(S): at 14:51

## 2025-05-03 RX ADMIN — Medication 100 MILLIGRAM(S): at 14:52

## 2025-05-03 NOTE — ED ADULT NURSE NOTE - CHIEF COMPLAINT QUOTE
c/o SOB weakness went to see her [facility] and was told her oxygen level and her temp was 100. pt also c/o runny nose and cough

## 2025-05-03 NOTE — ED PROVIDER NOTE - AVIAN FLU SYMPTOMS
Please see patient's my chart message  Informed patient she needs to schedule an appt for r/o UTI  Patient states that she cannot be seen for appt.  Please advise if any recommendations.     No

## 2025-05-03 NOTE — H&P ADULT - PROBLEM SELECTOR PLAN 1
2 weeks of URI symptoms, worsen over the past 2 day  CTA-Chest: No pulmonary embolism. Right lower lobe pneumonia, slightly increased from prior. Small right pleural effusion.   - IV-Fluid   - Hycodan VANDANA  - Ceftriaxone   - Tylenol for fever   - Protonix   - F/U Blood Cx   - Mucinex for cough   - DVT prophylaxis

## 2025-05-03 NOTE — ED ADULT NURSE NOTE - OBJECTIVE STATEMENT
A&Ox4, RR even and unlabored on RA. Skin is warm and dry. PT coming to Ed C/O SOB. PT saw her PCP and was told to come to ED after Xray showed possible PNA. Denies fevers or chills. Hx of lymphoma and on chemo. IV placed, labs sent and medicated per orders.

## 2025-05-03 NOTE — H&P ADULT - HISTORY OF PRESENT ILLNESS
79-year-old female with a PMH of Lung Cancer and CLL sent to the ED by PCP to be evaluated. Endorses 2-week history of URI symptoms, productive cough, congestion, Rhinorrhea was originally improving but the past 2 days symptoms have worsened with SOB, fatigue & cough. Went to her PCP today and was found to be hypoxic, tachycardic and febrile sent to the ED to be evaluated. Recently returned from a [place] trip, patient is actively coughing, endorses she has been like this for the past 2 days, denies chest pain, palpitation, headache, dizziness, abd pain, N/V/D or any other acute complaints. Labs remarkable for WBC:11.28 + left shift, H/H:10.0/28.8, Platelet:585, Na:130. Vitals: BP:126/75, HR:129, Temp: 98.5. Received 1.5L, Ceftriaxone, Doxycycline,  Ofirmev.    CTA-Chest: No pulmonary embolism. Right lower lobe pneumonia, slightly increased from prior. Small right pleural effusion.

## 2025-05-03 NOTE — H&P ADULT - NSHPPHYSICALEXAM_GEN_ALL_CORE
GENERAL: NAD, comfortable in bed. Saturating well in RA   HEAD:  Atraumatic, Normocephalic  EYES: EOMI, PERRL, conjunctiva and sclera clear  NECK: Supple, No JVD  LUNG: Coarse breath sounds. No increase work of breathing.   HEART: Regular rate and rhythm; No murmurs, rubs, or gallops  ABDOMEN: +BS, Soft, Nontender, Nondistended  EXTREMITIES:  2+ Peripheral Pulses, No clubbing, cyanosis, or edema  MSK: Severe Kyphosis   PSYCH: normal mood and affect  NEUROLOGY: AAOx3, non-focal No gross  SKIN: No rashes or lesions

## 2025-05-03 NOTE — ED ADULT TRIAGE NOTE - CHIEF COMPLAINT QUOTE
c/o SOB weakness went to see her [facility] and was told her oxygen level and her temp was 100. pt also c/o runny nose and cough

## 2025-05-03 NOTE — ED ADULT NURSE REASSESSMENT NOTE - NSFALLUNIVINTERV_ED_ALL_ED
Bed/Stretcher in lowest position, wheels locked, appropriate side rails in place/Call bell, personal items and telephone in reach/Instruct patient to call for assistance before getting out of bed/chair/stretcher/Non-slip footwear applied when patient is off stretcher/[place] to call system/Physically safe environment - no spills, clutter or unnecessary equipment/Purposeful proactive rounding/Room/bathroom lighting operational, light cord in reach

## 2025-05-03 NOTE — H&P ADULT - PROBLEM SELECTOR PLAN 3
H/H: 10.0/28.8  Platelet: 585 & WBC: 11.28 (Chronic)  ---> History of CLL   - Monitor H/H   -Transfuse as per protocol for hgb <7

## 2025-05-03 NOTE — H&P ADULT - NSICDXPASTMEDICALHX_GEN_ALL_CORE_FT
PAST MEDICAL HISTORY:  GERD (gastroesophageal reflux disease)     Hyperlipidemia     Intraductal carcinoma in situ of right breast     Kyphosis     Lung nodules     Lymphoma     Scoliosis

## 2025-05-03 NOTE — ED ADULT NURSE NOTE - IN THE PAST 12 MONTHS HAVE YOU USED DRUGS OTHER THAN THOSE REQUIRED FOR MEDICAL REASON?
Visit Information Date & Time Provider Department Dept. Phone Encounter #  
 4/3/2017 11:00 AM Amy Glez, [address] [place] [address] [phone] [id number] Upcoming Health Maintenance Date Due Hepatitis A Peds Age 1-18 (2 of 2 - Standard Series) 10/27/2016 Varicella Peds Age 1-18 (2 of 2 - 2 Dose Childhood Series) 4/3/2019 IPV Peds Age 0-18 (4 of 4 - All-IPV Series) 4/3/2019 MMR Peds Age 1-18 (2 of 2) 4/3/2019 DTaP/Tdap/Td series (5 - DTaP) 4/3/2019 MCV through Age 25 (1 of 2) 4/3/2026 Allergies as of 4/3/2017  Review Complete On: 4/3/2017 By: Amy Glez MD  
 No Known Allergies Current Immunizations  Reviewed on 12/23/2016 Name Date DTaP 7/19/2016 [id number] 2015, 2015 11:32 AM, 2015 Hep A Vaccine 2 Dose Schedule (Ped/Adol) 4/27/2016 Hep B, Adol/Ped 2015, 2015, 2015  8:13 PM  
 Hib (PRP-T) 7/19/2016 Influenza Vaccine (Quad) Ped PF 10/19/2016, 1/27/2016, 2015 MMR 4/27/2016 Pneumococcal Conjugate (PCV-13) 4/27/2016, 2015 11:31 AM, 2015 Rotavirus, Live, Pentavalent Vaccine 2015, 2015 11:32 AM, 2015 Varicella Virus Vaccine 4/27/2016 Not reviewed this visit You Were Diagnosed With   
  
 Codes Comments Acute bronchitis, unspecified organism    -  Primary ICD-10-CM: J20.9 ICD-9-CM: 466.0 Purulent rhinitis     ICD-10-CM: J31.0 ICD-9-CM: 472.0 Fever, unspecified fever cause     ICD-10-CM: R50.9 ICD-9-CM: 780.60 Cough     ICD-10-CM: R05 ICD-9-CM: 475. 2 Vitals Temp Height(growth percentile) Weight(growth percentile) HC BMI Smoking Status 98.7 °F (37.1 °C) (Axillary) (!) 2' 11\" (0.889 m) (76 %, Z= 0.70)* 30 lb 9.6 oz (13.9 kg) (80 %, Z= 0.84)* 48 cm (32 %, Z= -0.46) 17.56 kg/m2 (75 %, Z= 0.68)* Never Smoker *Growth percentiles are based on CDC 2-20 Years data. Growth percentiles are based on [facility] 0-36 Months data. Vitals History BSA Data Body Surface Area  
 0.59 m 2 Preferred Pharmacy Pharmacy Name Phone [facility] [address], [address] [place] [address] Avenue [phone] Your Updated Medication List  
  
   
This list is accurate as of: 4/3/17 11:33 AM.  Always use your most recent med list.  
  
  
  
  
 amoxicillin 400 mg/5 mL suspension Commonly known as:  AMOXIL Take 5.5 mL by mouth two (2) times a day for 10 days. ibuprofen 100 mg/5 mL suspension Commonly known as:  ADVIL;MOTRIN Take 6 mL by mouth four (4) times daily as needed for Fever. Prescriptions Sent to Pharmacy Refills  
 amoxicillin (AMOXIL) 400 mg/5 mL suspension 0 Sig: Take 5.5 mL by mouth two (2) times a day for 10 days. Class: Normal  
 Pharmacy: [facility] [address], [address] [place] [address] Ph #: [phone] Route: Oral  
  
We Performed the Following AMB POC DEREJE INFLUENZA A/B TEST [10355 CPT(R)] Patient Instructions Bronchitis in Children: Care Instructions Your Care Instructions Bronchitis is inflammation of the bronchial tubes, which carry air to the lungs. When these tubes are inflamed, they swell and produce mucus. The swollen tubes and increased mucus make your child cough and may make it harder for him or her to breathe. Bronchitis is usually caused by viruses and often follows a cold or flu. Antibiotics usually do not help and they may be harmful. Bronchitis lasts about 2 to 3 weeks in otherwise healthy children. Children who live with parents who smoke around them may get repeated bouts of bronchitis. Follow-up care is a key part of your child's treatment and safety. Be sure to make and go to all appointments, and call your doctor if your child is having problems.  It's also a good idea to know your child's test results and keep a list of the medicines your child takes. How can you care for your child at home? · Make sure your child rests. Keep your child at home as long as he or she has a fever. · Have your child take medicines exactly as prescribed. Call your doctor if you think your child is having a problem with his or her medicine. · Give your child acetaminophen (Tylenol) or ibuprofen (Advil, Motrin) for fever, pain, or fussiness. Read and follow all instructions on the label. Do not give aspirin to anyone younger than 20. It has been linked to Reye syndrome, a serious illness. · Be careful with cough and cold medicines. Don't give them to children younger than 6, because they don't work for children that age and can even be harmful. For children 6 and older, always follow all the instructions carefully. Make sure you know how much medicine to give and how long to use it. And use the dosing device if one is included. · Be careful when giving your child over-the-counter cold or flu medicines and Tylenol at the same time. Many of these medicines have acetaminophen, which is Tylenol. Read the labels to make sure that you are not giving your child more than the recommended dose. Too much acetaminophen (Tylenol) can be harmful. · Your doctor may prescribe an inhaled medicine called a bronchodilator that makes breathing easier. Help your child use it as directed. · If your child has problems breathing because of a stuffy nose, squirt a few saline (saltwater) nasal drops in one nostril. Then have your child blow his or her nose. Repeat for the other nostril. For infants, put a drop or two in one nostril, and wait for 1 to 2 minutes. Using a soft rubber suction bulb, squeeze air out of the bulb, and gently place the tip of the bulb inside the baby's nose. Relax your hand to suck the mucus from the nose. Repeat in the other nostril. · Place a humidifier by your child's bed or close to your child.  This will make it easier for your child to breathe. Follow the instructions for cleaning the machine. · Keep your child away from smoke. Do not smoke or let anyone else smoke in your house. · Wash your hands and your child's hands frequently so you do not spread the disease. When should you call for help? Call 911 anytime you think your child may need emergency care. For example, call if: 
· Your child has severe trouble breathing. Signs of this may include the chest sinking in, using belly muscles to breathe, or nostrils flaring while your child is struggling to breathe. Call your doctor now or seek immediate medical care if: 
· Your child has any trouble breathing. · Your child has increasing whistling sounds when he or she breathes (wheezing). · Your child has a cough that brings up yellow or green mucus (sputum) from the lungs, lasts longer than 2 days, and occurs along with a fever. · Your child coughs up blood. · Your child cannot keep down medicine or liquids. Watch closely for changes in your child's health, and be sure to contact your doctor if: 
· Your child is not getting better after 2 days. · Your child's cough lasts longer than 2 weeks. · Your child has new symptoms, such as a rash, an earache, or a sore throat. Where can you learn more? Go to http://ferny-yu.info/. Enter I516 in the search box to learn more about \"Bronchitis in Children: Care Instructions. \" Current as of: May 23, 2016 Content Version: 11.2 © 9771-4928 [employer]. Care instructions adapted under license by [employer] (which disclaims liability or warranty for this information). If you have questions about a medical condition or this instruction, always ask your healthcare professional. [place] [address] any warranty or liability for your use of this information. Rhinitis in Children: Care Instructions Your Care Instructions Rhinitis is swelling and irritation in the nose. Allergies and infections are often the cause. Your child's nose may run or feel stuffy. Other symptoms are itchy and sore eyes, ears, throat, and mouth. If allergies are the cause, your doctor may do tests to find out what your child is allergic to. You may be able to stop symptoms if your child avoids the things that cause them. Your doctor may suggest or prescribe medicine to ease the symptoms. Follow-up care is a key part of your child's treatment and safety. Be sure to make and go to all appointments, and call your doctor if your child is having problems. It's also a good idea to know your child's test results and keep a list of the medicines your child takes. How can you care for your child at home? · If your child's rhinitis is caused by allergies, try to find out what sets off (triggers) the symptoms. Take steps to avoid triggers. ¨ Avoid yard work near your child. This can stir up both pollen and mold. ¨ Keep your child away from smoke. Do not smoke or let anyone else smoke around your child or in your house. ¨ Do not use aerosol sprays, cleaning products, or perfumes around your child or in your house. ¨ If pollen is one of your child's triggers, close your house and car windows during blooming season. ¨ Clean your house often to control dust. 
¨ Keep pets outside. · If your doctor recommends over-the-counter medicines to relieve symptoms, give them to your child exactly as directed. Call your doctor if you think your child is having a problem with his or her medicine. · If your child has problems breathing because of a stuffy nose, squirt a few saline (saltwater) nasal drops in one nostril. For older children, have your child blow his or her nose. Repeat for the other nostril. For infants, put a drop or two in one nostril.  Using a soft rubber suction bulb, squeeze air out of the bulb, and gently place the tip of the bulb inside the baby's nose. Relax your hand to suck the mucus from the nose. Repeat in the other nostril. Do not do this more than 5 or 6 times a day. When should you call for help? Call your doctor now or seek immediate medical care if: 
· Your child is having trouble breathing. Watch closely for changes in your child's health, and be sure to contact your doctor if: 
· Your child has a fever or ear pain. · Your child has a cough or cold that lasts longer than 1 to 2 weeks. · Your child has pain in the forehead and symptoms of a sinus infection. These include a creamy yellow or green discharge from the nose. · Your child has severe itching of the eyes or nose. · Your child has any new symptoms, or the symptoms get worse. Where can you learn more? Go to http://ferny-yu.info/. Jim Diaz in the search box to learn more about \"Rhinitis in Children: Care Instructions. \" Current as of: July 29, 2016 Content Version: 11.2 © 2032-4922 [employer]. Care instructions adapted under license by [employer] (which disclaims liability or warranty for this information). If you have questions about a medical condition or this instruction, always ask your healthcare professional. [address] any warranty or liability for your use of this information. Supportive and comfort care include encouraging and increasing fluids, rest and fever reducers if needed. Please call us if fever persists for than another 48 hours or if new symptoms develop or if you feel your child is not improving as expected. Introducing [facility]! Dear Parent or Guardian, Thank you for requesting a Copyright Agent account for your child. With Copyright Agent, you can view your childs hospital or ER discharge instructions, current allergies, immunizations and much more.    
In order to access your childs information, we require a signed consent on file. Please see the [facility] department or call [phone] for instructions on completing a [employer] Proxy request.   
Additional Information If you have questions, please visit the Frequently Asked Questions section of the [employer] website at [email or web address] Remember, [employer] is NOT to be used for urgent needs. For medical emergencies, dial 911. Now available from your iPhone and Android! Please provide this summary of care documentation to your next provider. Your primary care clinician is listed as Jason Cummings. If you have any questions after today's visit, please call [phone]. No

## 2025-05-03 NOTE — H&P ADULT - NSICDXPASTSURGICALHX_GEN_ALL_CORE_FT
PAST SURGICAL HISTORY:  H/O colonoscopy     H/O lumpectomy     H/O lymph node biopsy     H/O total hysterectomy     S/P cataract surgery

## 2025-05-03 NOTE — ED CLERICAL - NS ED CLERK UNITS
[FreeTextEntry1] : Pt is a 55 yo s/p ex-lap, AZALIA, BSO, recto-sigmoid resection en-block, radical pelvic dissection with bilateral ureterolysis, holden's pouch, end-colostomy, cystotomy repair and HIPEC. 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 Plan for continued drainage with desai leg bag and re-evaluation of bladder in two weeks from last scan which was 2/20. Plan for CT cystogram on 3/5 and follow up the following week.   Discussed improtance of good nutrition in healing. The patient reports poor appetite and even things she loved before she is not eating now (sweets, candy). She reports drinking fluid normally. She is getting use to colostomy care. 
TELE

## 2025-05-03 NOTE — ED PROVIDER NOTE - OBJECTIVE STATEMENT
Pt is a 78 yo F co shortness of breath.  PMHx significant for lung CA, CLL.  Pt states that she recently returned from a trip to [place]. pt had a PET/CT scan on April 28 but did not know the results. Pt states that over the past two days she has had cough, increased sob and fatigue. PT went to her pcp today and was found to be tachycardic, hypoxic to 90 and fever to 100.  review of PET/CT from april 28th shows RLL infiltrate c/w pna.

## 2025-05-03 NOTE — H&P ADULT - ASSESSMENT
79-year-old female with a PMH of Lung Cancer and CLL sent to the ED by PCP to be evaluated. Endorses 2-week history of URI symptoms, productive cough, congestion, Rhinorrhea was originally improving but the past 2 days symptoms have worsened with SOB, fatigue & cough. Went to her PCP today and was found to be hypoxic, tachycardic and febrile sent to the ED to be evaluated. Recently returned from a [place] trip, patient is actively coughing, endorses she has been like this for the past 2 days, denies chest pain, palpitation, headache, dizziness, abd pain, N/V/D or any other acute complaints. Labs remarkable for WBC:11.28 + left shift, H/H:10.0/28.8, Platelet:585, Na:130. Vitals: BP:126/75, HR:129, Temp: 98.5. Received 1.5L, Ceftriaxone, Doxycycline,  Ofirmev.

## 2025-05-03 NOTE — ED ADULT NURSE REASSESSMENT NOTE - NS ED NURSE REASSESS COMMENT FT1
Pt is resting comfortably in the stretcher. No complaints made at this time. Denies SOB or coughing at this time.  Pt is admitted to [facility] for PNA, pending bed placement at this time.
Received pt after handoff in stable condition.  No complaints made at this time. Respiration even and non-labored. Pt is resting comfortably in the stretcher. Pt made comfortable. All safety precautions maintained.

## 2025-05-04 DIAGNOSIS — D64.9 ANEMIA, UNSPECIFIED: ICD-10-CM

## 2025-05-04 DIAGNOSIS — E87.1 HYPO-OSMOLALITY AND HYPONATREMIA: ICD-10-CM

## 2025-05-04 LAB
A1C WITH ESTIMATED AVERAGE GLUCOSE RESULT: 5.2 % — SIGNIFICANT CHANGE UP (ref 4–5.6)
ALBUMIN SERPL ELPH-MCNC: 2 G/DL — LOW (ref 3.3–5.2)
ALBUMIN SERPL ELPH-MCNC: 2.2 G/DL — LOW (ref 3.3–5.2)
ALP SERPL-CCNC: 121 U/L — HIGH (ref 40–120)
ALP SERPL-CCNC: 136 U/L — HIGH (ref 40–120)
ALT FLD-CCNC: 33 U/L — HIGH
ALT FLD-CCNC: 37 U/L — HIGH
ANION GAP SERPL CALC-SCNC: 11 MMOL/L — SIGNIFICANT CHANGE UP (ref 5–17)
ANION GAP SERPL CALC-SCNC: 12 MMOL/L — SIGNIFICANT CHANGE UP (ref 5–17)
AST SERPL-CCNC: 33 U/L — HIGH
AST SERPL-CCNC: 35 U/L — HIGH
BILIRUB SERPL-MCNC: 1.2 MG/DL — SIGNIFICANT CHANGE UP (ref 0.4–2)
BILIRUB SERPL-MCNC: 1.8 MG/DL — SIGNIFICANT CHANGE UP (ref 0.4–2)
BUN SERPL-MCNC: 11.6 MG/DL — SIGNIFICANT CHANGE UP (ref 8–20)
BUN SERPL-MCNC: 13.4 MG/DL — SIGNIFICANT CHANGE UP (ref 8–20)
CALCIUM SERPL-MCNC: 6.5 MG/DL — CRITICAL LOW (ref 8.4–10.5)
CALCIUM SERPL-MCNC: 7.7 MG/DL — LOW (ref 8.4–10.5)
CHLORIDE SERPL-SCNC: 100 MMOL/L — SIGNIFICANT CHANGE UP (ref 96–108)
CHLORIDE SERPL-SCNC: 104 MMOL/L — SIGNIFICANT CHANGE UP (ref 96–108)
CHOLEST SERPL-MCNC: 84 MG/DL — SIGNIFICANT CHANGE UP
CO2 SERPL-SCNC: 22 MMOL/L — SIGNIFICANT CHANGE UP (ref 22–29)
CO2 SERPL-SCNC: 22 MMOL/L — SIGNIFICANT CHANGE UP (ref 22–29)
CREAT SERPL-MCNC: 0.26 MG/DL — LOW (ref 0.5–1.3)
CREAT SERPL-MCNC: 0.27 MG/DL — LOW (ref 0.5–1.3)
EGFR: 111 ML/MIN/1.73M2 — SIGNIFICANT CHANGE UP
EGFR: 111 ML/MIN/1.73M2 — SIGNIFICANT CHANGE UP
EGFR: 112 ML/MIN/1.73M2 — SIGNIFICANT CHANGE UP
EGFR: 112 ML/MIN/1.73M2 — SIGNIFICANT CHANGE UP
ESTIMATED AVERAGE GLUCOSE: 103 MG/DL — SIGNIFICANT CHANGE UP (ref 68–114)
GLUCOSE SERPL-MCNC: 102 MG/DL — HIGH (ref 70–99)
GLUCOSE SERPL-MCNC: 94 MG/DL — SIGNIFICANT CHANGE UP (ref 70–99)
HCT VFR BLD CALC: 24.1 % — LOW (ref 34.5–45)
HDLC SERPL-MCNC: 22 MG/DL — LOW
HGB BLD-MCNC: 8.2 G/DL — LOW (ref 11.5–15.5)
LDLC SERPL-MCNC: 49 MG/DL — SIGNIFICANT CHANGE UP
LIPID PNL WITH DIRECT LDL SERPL: 49 MG/DL — SIGNIFICANT CHANGE UP
MCHC RBC-ENTMCNC: 32 PG — SIGNIFICANT CHANGE UP (ref 27–34)
MCHC RBC-ENTMCNC: 34 G/DL — SIGNIFICANT CHANGE UP (ref 32–36)
MCV RBC AUTO: 94.1 FL — SIGNIFICANT CHANGE UP (ref 80–100)
NONHDLC SERPL-MCNC: 62 MG/DL — SIGNIFICANT CHANGE UP
NRBC # BLD AUTO: 0 K/UL — SIGNIFICANT CHANGE UP (ref 0–0)
NRBC # FLD: 0 K/UL — SIGNIFICANT CHANGE UP (ref 0–0)
NRBC BLD AUTO-RTO: 0 /100 WBCS — SIGNIFICANT CHANGE UP (ref 0–0)
PLATELET # BLD AUTO: 542 K/UL — HIGH (ref 150–400)
PMV BLD: 9.5 FL — SIGNIFICANT CHANGE UP (ref 7–13)
POTASSIUM SERPL-MCNC: 3.1 MMOL/L — LOW (ref 3.5–5.3)
POTASSIUM SERPL-MCNC: 3.6 MMOL/L — SIGNIFICANT CHANGE UP (ref 3.5–5.3)
POTASSIUM SERPL-SCNC: 3.1 MMOL/L — LOW (ref 3.5–5.3)
POTASSIUM SERPL-SCNC: 3.6 MMOL/L — SIGNIFICANT CHANGE UP (ref 3.5–5.3)
PROT SERPL-MCNC: 4 G/DL — LOW (ref 6.6–8.7)
PROT SERPL-MCNC: 4.5 G/DL — LOW (ref 6.6–8.7)
RAPID RVP RESULT: DETECTED
RBC # BLD: 2.56 M/UL — LOW (ref 3.8–5.2)
RBC # FLD: 13.6 % — SIGNIFICANT CHANGE UP (ref 10.3–14.5)
RV+EV RNA SPEC QL NAA+PROBE: DETECTED
SARS-COV-2 RNA SPEC QL NAA+PROBE: SIGNIFICANT CHANGE UP
SODIUM SERPL-SCNC: 133 MMOL/L — LOW (ref 135–145)
SODIUM SERPL-SCNC: 138 MMOL/L — SIGNIFICANT CHANGE UP (ref 135–145)
TRIGL SERPL-MCNC: 54 MG/DL — SIGNIFICANT CHANGE UP
WBC # BLD: 10.32 K/UL — SIGNIFICANT CHANGE UP (ref 3.8–10.5)
WBC # FLD AUTO: 10.32 K/UL — SIGNIFICANT CHANGE UP (ref 3.8–10.5)

## 2025-05-04 PROCEDURE — 99233 SBSQ HOSP IP/OBS HIGH 50: CPT

## 2025-05-04 RX ORDER — IPRATROPIUM BROMIDE AND ALBUTEROL SULFATE .5; 2.5 MG/3ML; MG/3ML
3 SOLUTION RESPIRATORY (INHALATION) EVERY 6 HOURS
Refills: 0 | Status: DISCONTINUED | OUTPATIENT
Start: 2025-05-04 | End: 2025-05-06

## 2025-05-04 RX ORDER — DOXYCYCLINE HYCLATE 100 MG
100 TABLET ORAL EVERY 12 HOURS
Refills: 0 | Status: DISCONTINUED | OUTPATIENT
Start: 2025-05-04 | End: 2025-05-07

## 2025-05-04 RX ORDER — DEXTROMETHORPHAN HBR, GUAIFENESIN 200 MG/10ML
600 LIQUID ORAL EVERY 12 HOURS
Refills: 0 | Status: DISCONTINUED | OUTPATIENT
Start: 2025-05-04 | End: 2025-05-07

## 2025-05-04 RX ADMIN — Medication 100 MILLIGRAM(S): at 17:16

## 2025-05-04 RX ADMIN — Medication 40 MILLIGRAM(S): at 11:24

## 2025-05-04 RX ADMIN — Medication 50 MILLILITER(S): at 08:00

## 2025-05-04 RX ADMIN — CEFTRIAXONE 1000 MILLIGRAM(S): 500 INJECTION, POWDER, FOR SOLUTION INTRAMUSCULAR; INTRAVENOUS at 00:44

## 2025-05-04 RX ADMIN — IPRATROPIUM BROMIDE AND ALBUTEROL SULFATE 3 MILLILITER(S): .5; 2.5 SOLUTION RESPIRATORY (INHALATION) at 17:17

## 2025-05-04 RX ADMIN — CEFTRIAXONE 1000 MILLIGRAM(S): 500 INJECTION, POWDER, FOR SOLUTION INTRAMUSCULAR; INTRAVENOUS at 23:48

## 2025-05-04 RX ADMIN — HEPARIN SODIUM 5000 UNIT(S): 1000 INJECTION INTRAVENOUS; SUBCUTANEOUS at 13:06

## 2025-05-04 RX ADMIN — DEXTROMETHORPHAN HBR, GUAIFENESIN 600 MILLIGRAM(S): 200 LIQUID ORAL at 06:25

## 2025-05-04 RX ADMIN — HEPARIN SODIUM 5000 UNIT(S): 1000 INJECTION INTRAVENOUS; SUBCUTANEOUS at 22:22

## 2025-05-04 RX ADMIN — DEXTROMETHORPHAN HBR, GUAIFENESIN 600 MILLIGRAM(S): 200 LIQUID ORAL at 17:16

## 2025-05-04 RX ADMIN — Medication 650 MILLIGRAM(S): at 06:23

## 2025-05-05 LAB
ALBUMIN SERPL ELPH-MCNC: 2.2 G/DL — LOW (ref 3.3–5.2)
ALP SERPL-CCNC: 142 U/L — HIGH (ref 40–120)
ALT FLD-CCNC: 34 U/L — HIGH
ANION GAP SERPL CALC-SCNC: 11 MMOL/L — SIGNIFICANT CHANGE UP (ref 5–17)
AST SERPL-CCNC: 26 U/L — SIGNIFICANT CHANGE UP
BASOPHILS # BLD AUTO: 0.01 K/UL — SIGNIFICANT CHANGE UP (ref 0–0.2)
BASOPHILS NFR BLD AUTO: 0.1 % — SIGNIFICANT CHANGE UP (ref 0–2)
BILIRUB SERPL-MCNC: 1.2 MG/DL — SIGNIFICANT CHANGE UP (ref 0.4–2)
BUN SERPL-MCNC: 10.4 MG/DL — SIGNIFICANT CHANGE UP (ref 8–20)
CALCIUM SERPL-MCNC: 7.8 MG/DL — LOW (ref 8.4–10.5)
CHLORIDE SERPL-SCNC: 100 MMOL/L — SIGNIFICANT CHANGE UP (ref 96–108)
CO2 SERPL-SCNC: 24 MMOL/L — SIGNIFICANT CHANGE UP (ref 22–29)
CREAT SERPL-MCNC: 0.3 MG/DL — LOW (ref 0.5–1.3)
EGFR: 108 ML/MIN/1.73M2 — SIGNIFICANT CHANGE UP
EGFR: 108 ML/MIN/1.73M2 — SIGNIFICANT CHANGE UP
EOSINOPHIL # BLD AUTO: 0.06 K/UL — SIGNIFICANT CHANGE UP (ref 0–0.5)
EOSINOPHIL NFR BLD AUTO: 0.6 % — SIGNIFICANT CHANGE UP (ref 0–6)
GLUCOSE SERPL-MCNC: 87 MG/DL — SIGNIFICANT CHANGE UP (ref 70–99)
HCT VFR BLD CALC: 24.4 % — LOW (ref 34.5–45)
HGB BLD-MCNC: 8.4 G/DL — LOW (ref 11.5–15.5)
IMM GRANULOCYTES # BLD AUTO: 0.05 K/UL — SIGNIFICANT CHANGE UP (ref 0–0.07)
IMM GRANULOCYTES NFR BLD AUTO: 0.5 % — SIGNIFICANT CHANGE UP (ref 0–0.9)
LYMPHOCYTES # BLD AUTO: 0.99 K/UL — LOW (ref 1–3.3)
LYMPHOCYTES NFR BLD AUTO: 10.3 % — LOW (ref 13–44)
MCHC RBC-ENTMCNC: 31.9 PG — SIGNIFICANT CHANGE UP (ref 27–34)
MCHC RBC-ENTMCNC: 34.4 G/DL — SIGNIFICANT CHANGE UP (ref 32–36)
MCV RBC AUTO: 92.8 FL — SIGNIFICANT CHANGE UP (ref 80–100)
MONOCYTES # BLD AUTO: 0.09 K/UL — SIGNIFICANT CHANGE UP (ref 0–0.9)
MONOCYTES NFR BLD AUTO: 0.9 % — LOW (ref 2–14)
MRSA PCR RESULT.: SIGNIFICANT CHANGE UP
NEUTROPHILS # BLD AUTO: 8.4 K/UL — HIGH (ref 1.8–7.4)
NEUTROPHILS NFR BLD AUTO: 87.6 % — HIGH (ref 43–77)
NRBC # BLD AUTO: 0 K/UL — SIGNIFICANT CHANGE UP (ref 0–0)
NRBC # FLD: 0 K/UL — SIGNIFICANT CHANGE UP (ref 0–0)
NRBC BLD AUTO-RTO: 0 /100 WBCS — SIGNIFICANT CHANGE UP (ref 0–0)
PLATELET # BLD AUTO: 573 K/UL — HIGH (ref 150–400)
PMV BLD: 9.4 FL — SIGNIFICANT CHANGE UP (ref 7–13)
POTASSIUM SERPL-MCNC: 3.9 MMOL/L — SIGNIFICANT CHANGE UP (ref 3.5–5.3)
POTASSIUM SERPL-SCNC: 3.9 MMOL/L — SIGNIFICANT CHANGE UP (ref 3.5–5.3)
PROT SERPL-MCNC: 4.8 G/DL — LOW (ref 6.6–8.7)
RBC # BLD: 2.63 M/UL — LOW (ref 3.8–5.2)
RBC # FLD: 13.7 % — SIGNIFICANT CHANGE UP (ref 10.3–14.5)
S AUREUS DNA NOSE QL NAA+PROBE: SIGNIFICANT CHANGE UP
SODIUM SERPL-SCNC: 135 MMOL/L — SIGNIFICANT CHANGE UP (ref 135–145)
WBC # BLD: 9.6 K/UL — SIGNIFICANT CHANGE UP (ref 3.8–10.5)
WBC # FLD AUTO: 9.6 K/UL — SIGNIFICANT CHANGE UP (ref 3.8–10.5)

## 2025-05-05 PROCEDURE — 99233 SBSQ HOSP IP/OBS HIGH 50: CPT

## 2025-05-05 RX ORDER — FLUTICASONE PROPIONATE 50 UG/1
1 SPRAY, METERED NASAL
Refills: 0 | Status: DISCONTINUED | OUTPATIENT
Start: 2025-05-05 | End: 2025-05-07

## 2025-05-05 RX ADMIN — Medication 40 MILLIGRAM(S): at 13:47

## 2025-05-05 RX ADMIN — DEXTROMETHORPHAN HBR, GUAIFENESIN 600 MILLIGRAM(S): 200 LIQUID ORAL at 06:09

## 2025-05-05 RX ADMIN — IPRATROPIUM BROMIDE AND ALBUTEROL SULFATE 3 MILLILITER(S): .5; 2.5 SOLUTION RESPIRATORY (INHALATION) at 20:19

## 2025-05-05 RX ADMIN — HEPARIN SODIUM 5000 UNIT(S): 1000 INJECTION INTRAVENOUS; SUBCUTANEOUS at 13:46

## 2025-05-05 RX ADMIN — Medication 100 MILLIGRAM(S): at 19:01

## 2025-05-05 RX ADMIN — HEPARIN SODIUM 5000 UNIT(S): 1000 INJECTION INTRAVENOUS; SUBCUTANEOUS at 21:48

## 2025-05-05 RX ADMIN — DEXTROMETHORPHAN HBR, GUAIFENESIN 600 MILLIGRAM(S): 200 LIQUID ORAL at 19:01

## 2025-05-05 RX ADMIN — Medication 100 MILLIGRAM(S): at 06:09

## 2025-05-05 RX ADMIN — Medication 1 APPLICATION(S): at 13:47

## 2025-05-05 RX ADMIN — IPRATROPIUM BROMIDE AND ALBUTEROL SULFATE 3 MILLILITER(S): .5; 2.5 SOLUTION RESPIRATORY (INHALATION) at 13:49

## 2025-05-05 NOTE — PROGRESS NOTE ADULT - CONVERSATION DETAILS
Discussed with patient current diagnosis of PNA, patient improving well with nebulized therapies and abx, noted likely mixed viral and bacterial PNA. Currently with a need for supplemental O2 on exertion which will likely improve. Patient states she believes she has discussed Advanced directives in the past but cannot remember what she had written. At this time she would like to be considered full code, will treat her as such and encouraged her to ascertain if she had documentation which would contradict this and provide it to her PCP / have it put on file for future hospitalizations.

## 2025-05-06 ENCOUNTER — RESULT REVIEW (OUTPATIENT)
Age: 80
End: 2025-05-06

## 2025-05-06 DIAGNOSIS — R00.0 TACHYCARDIA, UNSPECIFIED: ICD-10-CM

## 2025-05-06 LAB
ANION GAP SERPL CALC-SCNC: 14 MMOL/L — SIGNIFICANT CHANGE UP (ref 5–17)
BASOPHILS # BLD AUTO: 0.02 K/UL — SIGNIFICANT CHANGE UP (ref 0–0.2)
BASOPHILS NFR BLD AUTO: 0.2 % — SIGNIFICANT CHANGE UP (ref 0–2)
BUN SERPL-MCNC: 10.2 MG/DL — SIGNIFICANT CHANGE UP (ref 8–20)
CALCIUM SERPL-MCNC: 8 MG/DL — LOW (ref 8.4–10.5)
CHLORIDE SERPL-SCNC: 98 MMOL/L — SIGNIFICANT CHANGE UP (ref 96–108)
CO2 SERPL-SCNC: 24 MMOL/L — SIGNIFICANT CHANGE UP (ref 22–29)
CREAT SERPL-MCNC: 0.33 MG/DL — LOW (ref 0.5–1.3)
EGFR: 105 ML/MIN/1.73M2 — SIGNIFICANT CHANGE UP
EGFR: 105 ML/MIN/1.73M2 — SIGNIFICANT CHANGE UP
EOSINOPHIL # BLD AUTO: 0.03 K/UL — SIGNIFICANT CHANGE UP (ref 0–0.5)
EOSINOPHIL NFR BLD AUTO: 0.3 % — SIGNIFICANT CHANGE UP (ref 0–6)
GLUCOSE SERPL-MCNC: 113 MG/DL — HIGH (ref 70–99)
HCT VFR BLD CALC: 26.6 % — LOW (ref 34.5–45)
HGB BLD-MCNC: 8.9 G/DL — LOW (ref 11.5–15.5)
IMM GRANULOCYTES # BLD AUTO: 0.06 K/UL — SIGNIFICANT CHANGE UP (ref 0–0.07)
IMM GRANULOCYTES NFR BLD AUTO: 0.7 % — SIGNIFICANT CHANGE UP (ref 0–0.9)
LYMPHOCYTES # BLD AUTO: 0.82 K/UL — LOW (ref 1–3.3)
LYMPHOCYTES NFR BLD AUTO: 9.1 % — LOW (ref 13–44)
MAGNESIUM SERPL-MCNC: 1.9 MG/DL — SIGNIFICANT CHANGE UP (ref 1.8–2.6)
MCHC RBC-ENTMCNC: 31.3 PG — SIGNIFICANT CHANGE UP (ref 27–34)
MCHC RBC-ENTMCNC: 33.5 G/DL — SIGNIFICANT CHANGE UP (ref 32–36)
MCV RBC AUTO: 93.7 FL — SIGNIFICANT CHANGE UP (ref 80–100)
MONOCYTES # BLD AUTO: 0.1 K/UL — SIGNIFICANT CHANGE UP (ref 0–0.9)
MONOCYTES NFR BLD AUTO: 1.1 % — LOW (ref 2–14)
NEUTROPHILS # BLD AUTO: 8 K/UL — HIGH (ref 1.8–7.4)
NEUTROPHILS NFR BLD AUTO: 88.6 % — HIGH (ref 43–77)
NRBC # BLD AUTO: 0 K/UL — SIGNIFICANT CHANGE UP (ref 0–0)
NRBC # FLD: 0 K/UL — SIGNIFICANT CHANGE UP (ref 0–0)
NRBC BLD AUTO-RTO: 0 /100 WBCS — SIGNIFICANT CHANGE UP (ref 0–0)
PHOSPHATE SERPL-MCNC: 2.8 MG/DL — SIGNIFICANT CHANGE UP (ref 2.4–4.7)
PLATELET # BLD AUTO: 640 K/UL — HIGH (ref 150–400)
PMV BLD: 8.9 FL — SIGNIFICANT CHANGE UP (ref 7–13)
POTASSIUM SERPL-MCNC: 4.3 MMOL/L — SIGNIFICANT CHANGE UP (ref 3.5–5.3)
POTASSIUM SERPL-SCNC: 4.3 MMOL/L — SIGNIFICANT CHANGE UP (ref 3.5–5.3)
RBC # BLD: 2.84 M/UL — LOW (ref 3.8–5.2)
RBC # FLD: 14.1 % — SIGNIFICANT CHANGE UP (ref 10.3–14.5)
SODIUM SERPL-SCNC: 136 MMOL/L — SIGNIFICANT CHANGE UP (ref 135–145)
TROPONIN T, HIGH SENSITIVITY RESULT: 15 NG/L — SIGNIFICANT CHANGE UP (ref 0–51)
WBC # BLD: 9.03 K/UL — SIGNIFICANT CHANGE UP (ref 3.8–10.5)
WBC # FLD AUTO: 9.03 K/UL — SIGNIFICANT CHANGE UP (ref 3.8–10.5)

## 2025-05-06 PROCEDURE — 93306 TTE W/DOPPLER COMPLETE: CPT | Mod: 26

## 2025-05-06 PROCEDURE — 93010 ELECTROCARDIOGRAM REPORT: CPT

## 2025-05-06 PROCEDURE — 71045 X-RAY EXAM CHEST 1 VIEW: CPT | Mod: 26

## 2025-05-06 PROCEDURE — 93010 ELECTROCARDIOGRAM REPORT: CPT | Mod: 77

## 2025-05-06 PROCEDURE — 99222 1ST HOSP IP/OBS MODERATE 55: CPT

## 2025-05-06 PROCEDURE — 99233 SBSQ HOSP IP/OBS HIGH 50: CPT

## 2025-05-06 RX ORDER — METOPROLOL SUCCINATE 50 MG/1
5 TABLET, EXTENDED RELEASE ORAL ONCE
Refills: 0 | Status: COMPLETED | OUTPATIENT
Start: 2025-05-06 | End: 2025-05-06

## 2025-05-06 RX ORDER — LEVALBUTEROL HYDROCHLORIDE 1.25 MG/3ML
0.63 SOLUTION RESPIRATORY (INHALATION) ONCE
Refills: 0 | Status: DISCONTINUED | OUTPATIENT
Start: 2025-05-06 | End: 2025-05-07

## 2025-05-06 RX ORDER — METOPROLOL SUCCINATE 50 MG/1
5 TABLET, EXTENDED RELEASE ORAL EVERY 6 HOURS
Refills: 0 | Status: DISCONTINUED | OUTPATIENT
Start: 2025-05-06 | End: 2025-05-07

## 2025-05-06 RX ORDER — METOPROLOL SUCCINATE 50 MG/1
25 TABLET, EXTENDED RELEASE ORAL
Refills: 0 | Status: DISCONTINUED | OUTPATIENT
Start: 2025-05-06 | End: 2025-05-06

## 2025-05-06 RX ORDER — METOPROLOL SUCCINATE 50 MG/1
25 TABLET, EXTENDED RELEASE ORAL
Refills: 0 | Status: DISCONTINUED | OUTPATIENT
Start: 2025-05-06 | End: 2025-05-07

## 2025-05-06 RX ADMIN — HEPARIN SODIUM 5000 UNIT(S): 1000 INJECTION INTRAVENOUS; SUBCUTANEOUS at 05:40

## 2025-05-06 RX ADMIN — HEPARIN SODIUM 5000 UNIT(S): 1000 INJECTION INTRAVENOUS; SUBCUTANEOUS at 13:08

## 2025-05-06 RX ADMIN — METOPROLOL SUCCINATE 25 MILLIGRAM(S): 50 TABLET, EXTENDED RELEASE ORAL at 17:26

## 2025-05-06 RX ADMIN — Medication 100 MILLIGRAM(S): at 05:40

## 2025-05-06 RX ADMIN — FLUTICASONE PROPIONATE 1 SPRAY(S): 50 SPRAY, METERED NASAL at 05:39

## 2025-05-06 RX ADMIN — FLUTICASONE PROPIONATE 1 SPRAY(S): 50 SPRAY, METERED NASAL at 17:28

## 2025-05-06 RX ADMIN — Medication 40 MILLIGRAM(S): at 12:15

## 2025-05-06 RX ADMIN — DEXTROMETHORPHAN HBR, GUAIFENESIN 600 MILLIGRAM(S): 200 LIQUID ORAL at 17:26

## 2025-05-06 RX ADMIN — Medication 2 MILLIGRAM(S): at 06:53

## 2025-05-06 RX ADMIN — METOPROLOL SUCCINATE 5 MILLIGRAM(S): 50 TABLET, EXTENDED RELEASE ORAL at 08:03

## 2025-05-06 RX ADMIN — CEFTRIAXONE 1000 MILLIGRAM(S): 500 INJECTION, POWDER, FOR SOLUTION INTRAMUSCULAR; INTRAVENOUS at 00:00

## 2025-05-06 RX ADMIN — Medication 100 MILLIGRAM(S): at 17:26

## 2025-05-06 RX ADMIN — Medication 1 APPLICATION(S): at 05:40

## 2025-05-06 RX ADMIN — DEXTROMETHORPHAN HBR, GUAIFENESIN 600 MILLIGRAM(S): 200 LIQUID ORAL at 05:40

## 2025-05-06 RX ADMIN — HEPARIN SODIUM 5000 UNIT(S): 1000 INJECTION INTRAVENOUS; SUBCUTANEOUS at 22:11

## 2025-05-06 RX ADMIN — METOPROLOL SUCCINATE 5 MILLIGRAM(S): 50 TABLET, EXTENDED RELEASE ORAL at 06:41

## 2025-05-06 NOTE — CONSULT NOTE ADULT - PROBLEM SELECTOR RECOMMENDATION 9
- PAT in setting of pneumonia/sepsis  - Currently NSR  - Asymptomatic  - Trop neg  - S/p lopressor 5 mg IVP this AM  - Start PO metoprolol tart  25 mg BID with hold parameters   - pending TTE for evaluation of cardiac function and any valvular abnormalities.   - Obtain event monitor as an outpatient to monitor for AF  - management of pna/sepsis as per primary team    Discussed with Dr. Cervantes. Assessment and recommendations are final when note is signed by the attending. - PAT in setting of pneumonia/sepsis  - Currently NSR  - Asymptomatic  - Trop neg  - S/p lopressor 5 mg IVP this AM  - Start PO metoprolol tart  25 mg BID with hold parameters   - pending TTE for evaluation of cardiac function and any valvular abnormalities.   - Continue tele monitoring during admission. Obtain event monitor as an outpatient  - management of pna/sepsis as per primary team    Discussed with Dr. Cervantes. Assessment and recommendations are final when note is signed by the attending.

## 2025-05-06 NOTE — PROGRESS NOTE ADULT - SUBJECTIVE AND OBJECTIVE BOX
[facility] Division of Hospital Medicine    Chief Complaint:      SUBJECTIVE / OVERNIGHT EVENTS:    Patient seen and examined at bedside, no acute events overnight, patient denies any new complaints. blood cultures noted NGTD 24 hrs, patient remains with productive cough, improving, home O2 eval showed patient desaturating to 85%, will continue on current therapy and monitor for improvement, expect patient will not need home O2 upon DC.     MEDICATIONS  (STANDING):  cefTRIAXone Injectable. 1000 milliGRAM(s) IV Push every 24 hours  chlorhexidine 2% Cloths 1 Application(s) Topical <User Schedule>  doxycycline monohydrate Capsule 100 milliGRAM(s) Oral every 12 hours  fluticasone propionate 50 MICROgram(s)/spray Nasal Spray 1 Spray(s) Both Nostrils two times a day  guaiFENesin  milliGRAM(s) Oral every 12 hours  heparin   Injectable 5000 Unit(s) SubCutaneous every 8 hours  pantoprazole   Suspension 40 milliGRAM(s) Oral daily    MEDICATIONS  (PRN):  acetaminophen     Tablet .. 650 milliGRAM(s) Oral every 6 hours PRN Temp greater or equal to 38C (100.4F), Mild Pain (1 - 3)  albuterol/ipratropium for Nebulization 3 milliLiter(s) Nebulizer every 6 hours PRN Shortness of Breath and/or Wheezing  aluminum hydroxide/magnesium hydroxide/simethicone Suspension 30 milliLiter(s) Oral every 4 hours PRN Dyspepsia  hydrocodone/homatropine Syrup 5 milliLiter(s) Oral every 8 hours PRN Cough  melatonin 3 milliGRAM(s) Oral at bedtime PRN Insomnia  ondansetron Injectable 4 milliGRAM(s) IV Push every 8 hours PRN Nausea and/or Vomiting        I&O's Summary      PHYSICAL EXAM:  Vital Signs Last 24 Hrs  T(C): 37.2 (05 May 2025 11:20), Max: 37.6 (04 May 2025 16:23)  T(F): 99 (05 May 2025 11:20), Max: 99.6 (04 May 2025 16:23)  HR: 105 (05 May 2025 11:20) (86 - 111)  BP: 106/67 (05 May 2025 11:20) (106/67 - 133/79)  BP(mean): --  RR: 18 (05 May 2025 11:20) (18 - 18)  SpO2: 90% (05 May 2025 11:20) (90% - 96%)    Parameters below as of 05 May 2025 04:34  Patient On (Oxygen Delivery Method): room air      GENERAL: NAD, comfortable in bed. Saturating well on RA at rest    HEAD:  Atraumatic, Normocephalic  EYES: EOMI, PERRL, conjunctiva and sclera clear  NECK: Supple, No JVD  LUNG: decreased Breath sounds B/L, cough on deep inspiration    HEART: Regular rate and rhythm; No murmurs, rubs, or gallops  ABDOMEN: +BS, Soft, Nontender, Nondistended  EXTREMITIES:  2+ Peripheral Pulses, No clubbing, cyanosis, or edema  MSK: Severe Kyphosis   PSYCH: normal mood and affect  NEUROLOGY: AAOx3, non-focal No gross  SKIN: No rashes or lesions    LABS:                        8.4    9.60  )-----------( 573      ( 05 May 2025 04:50 )             24.4     05-05    135  |  100  |  10.4  ----------------------------<  87  3.9   |  24.0  |  0.30[L]    Ca    7.8[L]      05 May 2025 04:50    TPro  4.8[L]  /  Alb  2.2[L]  /  TBili  1.2  /  DBili  x   /  AST  26  /  ALT  34[H]  /  AlkPhos  142[H]  05-05    PT/INR - ( 03 May 2025 14:53 )   PT: 14.5 sec;   INR: 1.25 ratio         PTT - ( 03 May 2025 14:53 )  PTT:28.8 sec      Urinalysis Basic - ( 05 May 2025 04:50 )    Color: x / Appearance: x / SG: x / pH: x  Gluc: 87 mg/dL / Ketone: x  / Bili: x / Urobili: x   Blood: x / Protein: x / Nitrite: x   Leuk Esterase: x / RBC: x / WBC x   Sq Epi: x / Non Sq Epi: x / Bacteria: x        Urinalysis with Rflx Culture (collected 03 May 2025 16:11)    Culture - Urine (collected 03 May 2025 16:11)  Source: Clean Catch  Final Report (04 May 2025 18:00):    <10,000 CFU/mL Normal Urogenital [place]    Culture - Blood (collected 03 May 2025 14:53)  Source: Blood Blood-Peripheral  Preliminary Report (04 May 2025 22:02):    No growth at 24 hours      CAPILLARY BLOOD GLUCOSE            RADIOLOGY & ADDITIONAL TESTS:  Results Reviewed:   Imaging Personally Reviewed:  Electrocardiogram Personally Reviewed:          
[facility] Division of Hospital Medicine    Chief Complaint:      SUBJECTIVE / OVERNIGHT EVENTS:    Patient seen and examined at bedside, overnight patient noted to have desaturation while walking to the bathroom, after which noted to have run of tachycardia, with concern for A.fib new onset. Seen by cardiology, suspicion for MAT which would not require AC. TTE wnl, likely in setting of underlying hypoxia / PNA, will continue to monitor on tele, started on BB with metoprolol, Nebs changed to xopenex from albuterol. Will continue to monitor.      MEDICATIONS  (STANDING):  cefTRIAXone Injectable. 1000 milliGRAM(s) IV Push every 24 hours  chlorhexidine 2% Cloths 1 Application(s) Topical <User Schedule>  doxycycline monohydrate Capsule 100 milliGRAM(s) Oral every 12 hours  fluticasone propionate 50 MICROgram(s)/spray Nasal Spray 1 Spray(s) Both Nostrils two times a day  guaiFENesin  milliGRAM(s) Oral every 12 hours  heparin   Injectable 5000 Unit(s) SubCutaneous every 8 hours  levalbuterol Inhalation 0.63 milliGRAM(s) Inhalation once  metoprolol tartrate 25 milliGRAM(s) Oral two times a day  pantoprazole   Suspension 40 milliGRAM(s) Oral daily    MEDICATIONS  (PRN):  acetaminophen     Tablet .. 650 milliGRAM(s) Oral every 6 hours PRN Temp greater or equal to 38C (100.4F), Mild Pain (1 - 3)  aluminum hydroxide/magnesium hydroxide/simethicone Suspension 30 milliLiter(s) Oral every 4 hours PRN Dyspepsia  hydrocodone/homatropine Syrup 5 milliLiter(s) Oral every 8 hours PRN Cough  melatonin 3 milliGRAM(s) Oral at bedtime PRN Insomnia  metoprolol tartrate Injectable 5 milliGRAM(s) IV Push every 6 hours PRN HR sustaining > 120  ondansetron Injectable 4 milliGRAM(s) IV Push every 8 hours PRN Nausea and/or Vomiting        I&O's Summary      PHYSICAL EXAM:  Vital Signs Last 24 Hrs  T(C): 36.4 (06 May 2025 11:19), Max: 37.4 (05 May 2025 19:57)  T(F): 97.6 (06 May 2025 11:19), Max: 99.3 (05 May 2025 19:57)  HR: 92 (06 May 2025 13:27) (92 - 140)  BP: 93/55 (06 May 2025 13:27) (91/57 - 132/67)  BP(mean): --  RR: 18 (06 May 2025 11:19) (18 - 18)  SpO2: 98% (06 May 2025 11:19) (91% - 98%)    Parameters below as of 06 May 2025 09:44  Patient On (Oxygen Delivery Method): nasal cannula  O2 Flow (L/min): 4      GENERAL: NAD, comfortable in bed. Saturating well on 2L NC   HEAD:  Atraumatic, Normocephalic  EYES: EOMI, PERRL, conjunctiva and sclera clear  NECK: Supple, No JVD  LUNG: decreased Breath sounds B/L, cough on deep inspiration    HEART: Regular rate and rhythm; No murmurs, rubs, or gallops  ABDOMEN: +BS, Soft, Nontender, Nondistended  EXTREMITIES:  2+ Peripheral Pulses, No clubbing, cyanosis, or edema  MSK: Severe Kyphosis   PSYCH: normal mood and affect  NEUROLOGY: AAOx3, non-focal No gross  SKIN: No rashes or lesions    LABS:                        8.9    9.03  )-----------( 640      ( 06 May 2025 07:38 )             26.6     05-06    136  |  98  |  10.2  ----------------------------<  113[H]  4.3   |  24.0  |  0.33[L]    Ca    8.0[L]      06 May 2025 07:38  Phos  2.8     05-06  Mg     1.9     05-06    TPro  4.8[L]  /  Alb  2.2[L]  /  TBili  1.2  /  DBili  x   /  AST  26  /  ALT  34[H]  /  AlkPhos  142[H]  05-05          Urinalysis Basic - ( 06 May 2025 07:38 )    Color: x / Appearance: x / SG: x / pH: x  Gluc: 113 mg/dL / Ketone: x  / Bili: x / Urobili: x   Blood: x / Protein: x / Nitrite: x   Leuk Esterase: x / RBC: x / WBC x   Sq Epi: x / Non Sq Epi: x / Bacteria: x        Urinalysis with Rflx Culture (collected 03 May 2025 16:11)    Culture - Urine (collected 03 May 2025 16:11)  Source: Clean Catch  Final Report (04 May 2025 18:00):    <10,000 CFU/mL Normal Urogenital [place]ores      CAPILLARY BLOOD GLUCOSE            RADIOLOGY & ADDITIONAL TESTS:  Results Reviewed:   Imaging Personally Reviewed:  Electrocardiogram Personally Reviewed:            
[facility] Division of Hospital Medicine    Chief Complaint:      SUBJECTIVE / OVERNIGHT EVENTS:    Patient seen and examined at bedside, no acute events overnight, patient denies any new complaints. admitted overnight with sepsis in setting of likely PNA, continues on Rocephin / given erythromycin allergy added doxycycline for atypical coverage. Pending Blood cultures, patient not hypoxic, remains tachycardic, with cough on deep inspiration.     MEDICATIONS  (STANDING):  cefTRIAXone Injectable. 1000 milliGRAM(s) IV Push every 24 hours  doxycycline monohydrate Capsule 100 milliGRAM(s) Oral every 12 hours  guaiFENesin  milliGRAM(s) Oral every 12 hours  heparin   Injectable 5000 Unit(s) SubCutaneous every 8 hours  pantoprazole   Suspension 40 milliGRAM(s) Oral daily  sodium chloride 0.9%. 1000 milliLiter(s) (50 mL/Hr) IV Continuous <Continuous>    MEDICATIONS  (PRN):  acetaminophen     Tablet .. 650 milliGRAM(s) Oral every 6 hours PRN Temp greater or equal to 38C (100.4F), Mild Pain (1 - 3)  aluminum hydroxide/magnesium hydroxide/simethicone Suspension 30 milliLiter(s) Oral every 4 hours PRN Dyspepsia  melatonin 3 milliGRAM(s) Oral at bedtime PRN Insomnia  ondansetron Injectable 4 milliGRAM(s) IV Push every 8 hours PRN Nausea and/or Vomiting        I&O's Summary      PHYSICAL EXAM:  Vital Signs Last 24 Hrs  T(C): 36.7 (04 May 2025 10:55), Max: 37.1 (03 May 2025 23:27)  T(F): 98 (04 May 2025 10:55), Max: 98.7 (03 May 2025 23:27)  HR: 102 (04 May 2025 10:55) (96 - 110)  BP: 106/62 (04 May 2025 10:55) (102/59 - 124/71)  BP(mean): 87 (04 May 2025 06:38) (81 - 87)  RR: 18 (04 May 2025 10:55) (18 - 20)  SpO2: 93% (04 May 2025 10:55) (93% - 100%)    Parameters below as of 04 May 2025 10:55  Patient On (Oxygen Delivery Method): room air      GENERAL: NAD, comfortable in bed. Saturating well in RA   HEAD:  Atraumatic, Normocephalic  EYES: EOMI, PERRL, conjunctiva and sclera clear  NECK: Supple, No JVD  LUNG: Coarse breath sounds B/L, cough on deep inspiration    HEART: Regular rate and rhythm; No murmurs, rubs, or gallops  ABDOMEN: +BS, Soft, Nontender, Nondistended  EXTREMITIES:  2+ Peripheral Pulses, No clubbing, cyanosis, or edema  MSK: Severe Kyphosis   PSYCH: normal mood and affect  NEUROLOGY: AAOx3, non-focal No gross  SKIN: No rashes or lesions    LABS:                        8.2    10.32 )-----------( 542      ( 04 May 2025 05:23 )             24.1     05-04    133[L]  |  100  |  13.4  ----------------------------<  102[H]  3.6   |  22.0  |  0.26[L]    Ca    7.7[L]      04 May 2025 08:20    TPro  4.5[L]  /  Alb  2.0[L]  /  TBili  1.8  /  DBili  x   /  AST  33[H]  /  ALT  37[H]  /  AlkPhos  136[H]  05-04    PT/INR - ( 03 May 2025 14:53 )   PT: 14.5 sec;   INR: 1.25 ratio         PTT - ( 03 May 2025 14:53 )  PTT:28.8 sec      Urinalysis Basic - ( 04 May 2025 08:20 )    Color: x / Appearance: x / SG: x / pH: x  Gluc: 102 mg/dL / Ketone: x  / Bili: x / Urobili: x   Blood: x / Protein: x / Nitrite: x   Leuk Esterase: x / RBC: x / WBC x   Sq Epi: x / Non Sq Epi: x / Bacteria: x        Urinalysis with Rflx Culture (collected 03 May 2025 16:11)      CAPILLARY BLOOD GLUCOSE            RADIOLOGY & ADDITIONAL TESTS:  Results Reviewed:   Imaging Personally Reviewed:  Electrocardiogram Personally Reviewed:

## 2025-05-06 NOTE — PROGRESS NOTE ADULT - ASSESSMENT
79-year-old female with a PMH of Lung Cancer and CLL sent to the ED by PCP to be evaluated. Endorses 2-week history of URI symptoms, productive cough, congestion, Rhinorrhea was originally improving but the past 2 days symptoms have worsened with SOB, fatigue & cough. Recently returned from a [place] trip, patient is actively coughing, endorses she has been like this for the past 2 days. Admitted with sepsis in setting of PNA.       Sepsis in setting of Pneumonia.   2 weeks of URI symptoms, worsen over the past 2 day  CTA-Chest: No pulmonary embolism. Right lower lobe pneumonia, slightly increased from prior (Noted on PET scan performed end of April as well). Small right pleural effusion.   - s/p IV-Fluid   - Ceftriaxone / Doxy for CAP coverage at this time (patient allergic to Penicillin / Erythromycin (Severe nausea/ GI upset with erythromycin / Rash with penicillin))   - Tylenol for fever   - Protonix   - F/U Blood Cx   - Mucinex for cough   - DVT prophylaxis.    Hyponatremia.   Na: 133 this AM   Received 1.5L-NS   - s/p additional IVF, continue to monitor   - Monitor Na level.    Anemia.   Hgb 8.2  Platelet: 585 & WBC: 11.28 (Chronic)  ---> History of CLL   Neutrophilic predominance noted on admission, in setting of acute infection  - Monitor H/H   -Transfuse as per protocol for hgb <7.    DVT Prophylaxis: Heparin subq  Diet: DASH  Code Status: Full  Dispo: Pending blood cultures, expect DC home 1-2 days, will need home O2 eval prior to DC   
79-year-old female with a PMH of Lung Cancer and CLL sent to the ED by PCP to be evaluated. Endorses 2-week history of URI symptoms, productive cough, congestion, Rhinorrhea was originally improving but the past 2 days symptoms have worsened with SOB, fatigue & cough. Recently returned from a [place] trip, patient is actively coughing, endorses she has been like this for the past 2 days. Admitted with sepsis in setting of PNA. Continues to desaturate on exertion. Course complicated by run of tachycardia overnight 5/5 with exertion, concern for a.fib vs MAT, seen by cardiology, TTE wnl.     #Sepsis in setting of Pneumonia.   RVP positive for entero / rhinovirus, suspect component of gram negative PNA as well   2 weeks of URI symptoms, worsen over the past 2 day  CTA-Chest: No pulmonary embolism. Right lower lobe pneumonia, slightly increased from prior (Noted on PET scan performed end of April as well). Small right pleural effusion.   - s/p IV-Fluid   - Ceftriaxone / Doxy for CAP coverage at this time (patient allergic to Penicillin / Erythromycin (Severe nausea/ GI upset with erythromycin / Rash with penicillin))   - Tylenol for fever   - Protonix   - Blood Cx NGTD   - Mucinex for cough, added Hycodan PRN for significant cough  added flonase BID for post nasal drip  - Continue Nebs improving respiratory status per patient  Changed to xopenex from Duonebs given tachycardia    - DVT prophylaxis.    #Tachycardia   A.fib vs MAT?   in setting of underlying hypoxia / PNA   duoneb transitioned to Xopenex as noted   - Cardiology consulted, considering MAT at this time as opposed to A.fib which would not require AC, will continue to monitor on Tele   TTE wnl   continue metoprolol 25 mg BID     #Hyponatremia.   resolved   Na: 136 this AM   Received 1.5L-NS   - s/p additional IVF, tolerating PO well     #Anemia.  stable    Hgb 8.9  Platelet: 585 & WBC: 11.28 (Chronic)  ---> History of CLL   Neutrophilic predominance noted on admission, in setting of acute infection  - Monitor H/H   -Transfuse as per protocol for hgb <7.    DVT Prophylaxis: Heparin subq  Diet: DASH  Code Status: Full  Dispo: pending improvement in respiratory status, would require Home O2 at this time, expect patient to improve to point of not requiring, will re test home O2 eval in AM, Continue to monitor Telemetry for A.fib vs. continued MAT will improve with improvement in PNA   
79-year-old female with a PMH of Lung Cancer and CLL sent to the ED by PCP to be evaluated. Endorses 2-week history of URI symptoms, productive cough, congestion, Rhinorrhea was originally improving but the past 2 days symptoms have worsened with SOB, fatigue & cough. Recently returned from a [place] trip, patient is actively coughing, endorses she has been like this for the past 2 days. Admitted with sepsis in setting of PNA. Continues to desaturate on exertion however improving, will continue to monitor on current therapy, repeat Home O2 eval in AM.       Sepsis in setting of Pneumonia.   RVP positive for entero / rhinovirus, suspect component of gram negative PNA as well   2 weeks of URI symptoms, worsen over the past 2 day  CTA-Chest: No pulmonary embolism. Right lower lobe pneumonia, slightly increased from prior (Noted on PET scan performed end of April as well). Small right pleural effusion.   - s/p IV-Fluid   - Ceftriaxone / Doxy for CAP coverage at this time (patient allergic to Penicillin / Erythromycin (Severe nausea/ GI upset with erythromycin / Rash with penicillin))   - Tylenol for fever   - Protonix   - Blood Cx NGTD 24 hrs   - Mucinex for cough, added Hycodan PRN for significant cough  added flonase BID for post nasal drip  - Continue Nebs improving respiratory status per patient   - DVT prophylaxis.    Hyponatremia.   resolved   Na: 135 this AM   Received 1.5L-NS   - s/p additional IVF, tolerating PO well     Anemia.  stable    Hgb 8.4  Platelet: 585 & WBC: 11.28 (Chronic)  ---> History of CLL   Neutrophilic predominance noted on admission, in setting of acute infection  - Monitor H/H   -Transfuse as per protocol for hgb <7.    DVT Prophylaxis: Heparin subq  Diet: DASH  Code Status: Full  Dispo: pending improvement in respiratory status, would require Home O2 at this time, expect patient to improve to point of not requiring, will re test home O2 eval in AM, continue abx therapy / neb therapy at this time

## 2025-05-06 NOTE — PROVIDER CONTACT NOTE (CHANGE IN STATUS NOTIFICATION) - SITUATION
Patient was having sinus tachycardia after ambulating to the bathroom, SOB and low O2 Saturation, denies chest pain, dizziness and no altered mental status.

## 2025-05-06 NOTE — CONSULT NOTE ADULT - NS ATTEND AMEND GEN_ALL_CORE FT
· Assessment	  79-year-old female with a PMH of Lung Cancer and CLL sent to the ED by PCP to be evaluate for URI symptoms,  admitted for sepsis in setting of pneumonia. Cardiology consulted for tachycardia. Tele with SR to ST, PAT. Trop neg. EKG non ischemic.    sinus rhythm with PAC's  EPisodes of PSVT, likely MAT      EKG misinterpreted, clear sinus with PAC  MAT on telemetry, now in sinus rhythm  no true AF    recommend PO lopressor with hold parameters  update 2d TTE  outpatient event monitor    control MAT with treating underlying Pneumonia, no role of A/C in MAT  increase dose of AV silvina as able    we will follow peripherally if no significant concerns on 2d TTE.

## 2025-05-06 NOTE — PROGRESS NOTE ADULT - TIME BILLING
Chart, labs, orders, vitals, and imaging reviewed and plan of care discussed with consultants and IDR team in detail. Plan of care discussed with patient at bedside.

## 2025-05-06 NOTE — CONSULT NOTE ADULT - ASSESSMENT
79-year-old female with a PMH of Lung Cancer and CLL sent to the ED by PCP to be evaluate for URI symptoms,  admitted for sepsis in setting of pneumonia. Cardiology consulted for tachycardia. Tele with SR to ERIC SAPP. Trop neg. EKG non ischemic.

## 2025-05-06 NOTE — PROVIDER CONTACT NOTE (CHANGE IN STATUS NOTIFICATION) - ACTION/TREATMENT ORDERED:
Lopressor 5mg IV push given with 2 mg morphine iv push as per PAULY Nino orders.    after treatment, , o2 sat 94% at 4L NC, bp 96/66,   respirations 18 bpm. 98.3 T

## 2025-05-06 NOTE — CONSULT NOTE ADULT - SUBJECTIVE AND OBJECTIVE BOX
[facility]                                              CARDIOLOGY AT [place] [address]                                             Telephone: [phone]. Fax:[phone]                                                       CARDIOLOGY CONSULTATION NOTE                                                                                             History obtained by: Patient and medical record  Community Cardiologist: None    obtained: Yes [  ] No [x  ]  Reason for Consultation: Tachycardia  Available out pt records reviewed: Yes [  ] No [x  ]    Chief complaint:    79-year-old female with a PMH of Lung Cancer and CLL sent to the ED by PCP to be evaluate for URI symptoms,  admitted for sepsis in setting of pneumonia. Cardiology consulted for tachycardia.       HPI:  Pt seen and examined at bedside. She denies any cardiac complaints including cp, palps , dizziness, PND, edema, orthopnea. Denies any known h/o arrhythmia or cardiac disease. Tele currently shows NSR in 90s but has had intermittent tachycardia up to 160s, asymptomatic. She was given lopressor IVP 5 mg this am.     PAST MEDICAL HISTORY  No pertinent past medical history    Intraductal carcinoma in situ of right breast    Hyperlipidemia    Lymphoma    GERD (gastroesophageal reflux disease)    Lung nodules    Scoliosis    History of kyphoscoliosis    Kyphosis        PAST SURGICAL HISTORY  No significant past surgical history    H/O total hysterectomy    H/O lumpectomy    S/P cataract surgery    H/O lymph node biopsy    H/O colonoscopy        SOCIAL HISTORY:  Denies smoking/alcohol/drugs       FAMILY HISTORY:  FH: breast cancer  2 first cousins, paternal grandmother    FH: colon cancer (Father, Aunt)    FH: type 2 diabetes (Father)    FH: liver cancer (Father)       HOME MEDICATIONS:  Adderall 30 mg oral tablet: 0.5 tab(s) orally once a day (19 Oct 2023 06:50)  Anoro Ellipta 62.5 mcg-25 mcg/inh inhalation powder: 1 puff(s) inhaled once a day (19 Oct 2023 06:52)      CURRENT CARDIAC MEDICATIONS:  metoprolol tartrate 25 milliGRAM(s) Oral two times a day  metoprolol tartrate Injectable 5 milliGRAM(s) IV Push every 6 hours PRN HR sustaining > 120      CURRENT OTHER MEDICATIONS:  guaiFENesin  milliGRAM(s) Oral every 12 hours  hydrocodone/homatropine Syrup 5 milliLiter(s) Oral every 8 hours PRN Cough  levalbuterol Inhalation 0.63 milliGRAM(s) Inhalation once, Stop order after: 1 Doses  acetaminophen     Tablet .. 650 milliGRAM(s) Oral every 6 hours PRN Temp greater or equal to 38C (100.4F), Mild Pain (1 - 3)  melatonin 3 milliGRAM(s) Oral at bedtime PRN Insomnia  ondansetron Injectable 4 milliGRAM(s) IV Push every 8 hours PRN Nausea and/or Vomiting  aluminum hydroxide/magnesium hydroxide/simethicone Suspension 30 milliLiter(s) Oral every 4 hours PRN Dyspepsia  pantoprazole   Suspension 40 milliGRAM(s) Oral daily  cefTRIAXone Injectable. 1000 milliGRAM(s) IV Push every 24 hours, Stop order after: 5 Days  chlorhexidine 2% Cloths 1 Application(s) Topical <User Schedule>  doxycycline monohydrate Capsule 100 milliGRAM(s) Oral every 12 hours, Stop order after: 5 Days  fluticasone propionate 50 MICROgram(s)/spray Nasal Spray 1 Spray(s) Both Nostrils two times a day  heparin   Injectable 5000 Unit(s) SubCutaneous every 8 hours      ALLERGIES:   penicillin (Swelling; Rash)  erythromycin (Nausea; Vomiting)      REVIEW OF SYMPTOMS:   CONSTITUTIONAL: No fever, no chills, no weight loss, no weight gain, no fatigue   ENMT:  No vertigo; No sinus or throat pain  NECK: No pain or stiffness  CARDIOVASCULAR: No chest pain, no dyspnea, no syncope/presyncope, no palpitations, no dizziness, no Orthopnea, no Paroxsymal nocturnal dyspnea  RESPIRATORY: no Shortness of breath, no cough, no wheezing  : No dysuria, no hematuria   GI: No dark color stool, no nausea, no diarrhea, no constipation, no abdominal pain   NEURO: No headache, no slurred speech   MUSCULOSKELETAL: No joint pain or swelling; No muscle, back, or extremity pain  PSYCH: No agitation, no anxiety.    ALL OTHER REVIEW OF SYSTEMS ARE NEGATIVE.    VITAL SIGNS:  T(C): 37 (05-06-25 @ 09:44), Max: 37.4 (05-05-25 @ 19:57)  T(F): 98.6 (05-06-25 @ 09:44), Max: 99.3 (05-05-25 @ 19:57)  HR: 101 (05-06-25 @ 09:44) (98 - 140)  BP: 96/58 (05-06-25 @ 09:44) (96/58 - 132/67)  RR: 18 (05-06-25 @ 09:44) (18 - 18)  SpO2: 96% (05-06-25 @ 09:44) (90% - 98%)            PHYSICAL EXAM:  Constitutional: Comfortable . No acute distress.   HEENT: Atraumatic and normocephalic , neck is supple .    Respiratory: CTAB, unlabored   Cardiovascular: RRR normal s1 s2. No murmur. No rubs or gallop.  Gastrointestinal: Soft, non-tender.    Extremities: 2+ Peripheral Pulses, No clubbing, cyanosis, or edema  Psychiatric: Calm . no agitation.   Skin: Warm and dry, no ulcers on extremities     LABS:                            8.9    9.03  )-----------( 640      ( 06 May 2025 07:38 )             26.6     05-06    136  |  98  |  10.2  ----------------------------<  113[H]  4.3   |  24.0  |  0.33[L]    Ca    8.0[L]      06 May 2025 07:38  Phos  2.8     05-06  Mg     1.9     05-06    TPro  4.8[L]  /  Alb  2.2[L]  /  TBili  1.2  /  DBili  x   /  AST  26  /  ALT  34[H]  /  AlkPhos  142[H]  05-05      Urinalysis Basic - ( 06 May 2025 07:38 )    Color: x / Appearance: x / SG: x / pH: x  Gluc: 113 mg/dL / Ketone: x  / Bili: x / Urobili: x   Blood: x / Protein: x / Nitrite: x   Leuk Esterase: x / RBC: x / WBC x   Sq Epi: x / Non Sq Epi: x / Bacteria: x              INTERPRETATION OF TELEMETRY:  SR, PAT     ECG: SR, PAT  Prior ECG: Yes [  x] No [  ]

## 2025-05-06 NOTE — CHART NOTE - NSCHARTNOTEFT_GEN_A_CORE
Medicine PA-  Cd for pt that became tachycardic 150's and hypoxic 86% when walking back from the bathroom on room air.  Pt admitted with PNA (+entero/rhinovirus) hx lung ca, CLL.  No known cardiac hx.  Denies CP, SOB and palpitations but is coughing intermittently.    Monitor: ?afib 150's    Vital Signs Last 24 Hrs  T(C): 36.9 (06 May 2025 04:38), Max: 37.4 (05 May 2025 19:57)  T(F): 98.5 (06 May 2025 04:38), Max: 99.3 (05 May 2025 19:57)  HR: 99 (06 May 2025 04:38) (98 - 110)  BP: 101/72 (06 May 2025 06:20) (101/72 - 132/67)  BP(mean): --  RR: 18 (06 May 2025 04:38) (18 - 18)  SpO2: 93% (06 May 2025 04:38) (90% - 98%)    Parameters below as of 06 May 2025 04:38  Patient On (Oxygen Delivery Method): room air    PE:  Gen- A+O x3, appears calm, speaking softly, but easily  HEENT- PERRL dry membranes  S1S2 ausc  Lungs- + wheezes b/l  Abd- soft, nontender  Ext- no edema    EKG- afib, nonspec ST abnorm    CXR- pending    >Tachycardia, ?new onset afib  -lopressor 5mg IVP x1  -Stat troponin, BMP, MG, phos, CBC  -cardio consult called    >PNA/ hypoxia  -4L n/c  -ms 2mg IVP  -xopenex neb tx  -reassurance given to pt  -continue to monitor closely  -s/o to day team

## 2025-05-07 ENCOUNTER — APPOINTMENT (OUTPATIENT)
Dept: HEMATOLOGY ONCOLOGY | Facility: CLINIC | Age: 80
End: 2025-05-07

## 2025-05-07 ENCOUNTER — TRANSCRIPTION ENCOUNTER (OUTPATIENT)
Age: 80
End: 2025-05-07

## 2025-05-07 VITALS
OXYGEN SATURATION: 99 % | DIASTOLIC BLOOD PRESSURE: 70 MMHG | SYSTOLIC BLOOD PRESSURE: 116 MMHG | RESPIRATION RATE: 18 BRPM | HEART RATE: 95 BPM | TEMPERATURE: 98 F

## 2025-05-07 LAB
ALBUMIN SERPL ELPH-MCNC: 2.3 G/DL — LOW (ref 3.3–5.2)
ALP SERPL-CCNC: 146 U/L — HIGH (ref 40–120)
ALT FLD-CCNC: 32 U/L — SIGNIFICANT CHANGE UP
ANION GAP SERPL CALC-SCNC: 12 MMOL/L — SIGNIFICANT CHANGE UP (ref 5–17)
ANISOCYTOSIS BLD QL: SLIGHT — SIGNIFICANT CHANGE UP
AST SERPL-CCNC: 32 U/L — HIGH
BASOPHILS # BLD AUTO: 0.02 K/UL — SIGNIFICANT CHANGE UP (ref 0–0.2)
BASOPHILS # BLD MANUAL: 0 K/UL — SIGNIFICANT CHANGE UP (ref 0–0.2)
BASOPHILS NFR BLD AUTO: 0.2 % — SIGNIFICANT CHANGE UP (ref 0–2)
BASOPHILS NFR BLD MANUAL: 0 % — SIGNIFICANT CHANGE UP (ref 0–2)
BILIRUB SERPL-MCNC: 0.8 MG/DL — SIGNIFICANT CHANGE UP (ref 0.4–2)
BUN SERPL-MCNC: 11.4 MG/DL — SIGNIFICANT CHANGE UP (ref 8–20)
CALCIUM SERPL-MCNC: 7.9 MG/DL — LOW (ref 8.4–10.5)
CHLORIDE SERPL-SCNC: 99 MMOL/L — SIGNIFICANT CHANGE UP (ref 96–108)
CO2 SERPL-SCNC: 25 MMOL/L — SIGNIFICANT CHANGE UP (ref 22–29)
CREAT SERPL-MCNC: 0.31 MG/DL — LOW (ref 0.5–1.3)
EGFR: 107 ML/MIN/1.73M2 — SIGNIFICANT CHANGE UP
EGFR: 107 ML/MIN/1.73M2 — SIGNIFICANT CHANGE UP
EOSINOPHIL # BLD AUTO: 0.06 K/UL — SIGNIFICANT CHANGE UP (ref 0–0.5)
EOSINOPHIL # BLD MANUAL: 0 K/UL — SIGNIFICANT CHANGE UP (ref 0–0.5)
EOSINOPHIL NFR BLD AUTO: 0.7 % — SIGNIFICANT CHANGE UP (ref 0–6)
EOSINOPHIL NFR BLD MANUAL: 0 % — SIGNIFICANT CHANGE UP (ref 0–6)
GIANT PLATELETS BLD QL SMEAR: PRESENT
GLUCOSE SERPL-MCNC: 118 MG/DL — HIGH (ref 70–99)
HCT VFR BLD CALC: 27.3 % — LOW (ref 34.5–45)
HGB BLD-MCNC: 9.2 G/DL — LOW (ref 11.5–15.5)
IMM GRANULOCYTES # BLD AUTO: 0.06 K/UL — SIGNIFICANT CHANGE UP (ref 0–0.07)
IMM GRANULOCYTES NFR BLD AUTO: 0.7 % — SIGNIFICANT CHANGE UP (ref 0–0.9)
LYMPHOCYTES # BLD AUTO: 1.53 K/UL — SIGNIFICANT CHANGE UP (ref 1–3.3)
LYMPHOCYTES # BLD MANUAL: 1.12 K/UL — SIGNIFICANT CHANGE UP (ref 1–3.3)
LYMPHOCYTES NFR BLD AUTO: 16.7 % — SIGNIFICANT CHANGE UP (ref 13–44)
LYMPHOCYTES NFR BLD MANUAL: 12.2 % — LOW (ref 13–44)
MCHC RBC-ENTMCNC: 31.5 PG — SIGNIFICANT CHANGE UP (ref 27–34)
MCHC RBC-ENTMCNC: 33.7 G/DL — SIGNIFICANT CHANGE UP (ref 32–36)
MCV RBC AUTO: 93.5 FL — SIGNIFICANT CHANGE UP (ref 80–100)
MONOCYTES # BLD AUTO: 0.15 K/UL — SIGNIFICANT CHANGE UP (ref 0–0.9)
MONOCYTES # BLD MANUAL: 0.24 K/UL — SIGNIFICANT CHANGE UP (ref 0–0.9)
MONOCYTES NFR BLD AUTO: 1.6 % — LOW (ref 2–14)
MONOCYTES NFR BLD MANUAL: 2.6 % — SIGNIFICANT CHANGE UP (ref 2–14)
NEUTROPHILS # BLD AUTO: 7.32 K/UL — SIGNIFICANT CHANGE UP (ref 1.8–7.4)
NEUTROPHILS # BLD MANUAL: 7.79 K/UL — HIGH (ref 1.8–7.4)
NEUTROPHILS NFR BLD AUTO: 80.1 % — HIGH (ref 43–77)
NEUTROPHILS NFR BLD MANUAL: 85.2 % — HIGH (ref 43–77)
NRBC # BLD AUTO: 0 K/UL — SIGNIFICANT CHANGE UP (ref 0–0)
NRBC # FLD: 0 K/UL — SIGNIFICANT CHANGE UP (ref 0–0)
NRBC BLD AUTO-RTO: 0 /100 WBCS — SIGNIFICANT CHANGE UP (ref 0–0)
PLAT MORPH BLD: NORMAL — SIGNIFICANT CHANGE UP
PLATELET # BLD AUTO: 679 K/UL — HIGH (ref 150–400)
PMV BLD: 9.1 FL — SIGNIFICANT CHANGE UP (ref 7–13)
POLYCHROMASIA BLD QL SMEAR: ABNORMAL
POTASSIUM SERPL-MCNC: 3.8 MMOL/L — SIGNIFICANT CHANGE UP (ref 3.5–5.3)
POTASSIUM SERPL-SCNC: 3.8 MMOL/L — SIGNIFICANT CHANGE UP (ref 3.5–5.3)
PROT SERPL-MCNC: 5.3 G/DL — LOW (ref 6.6–8.7)
RBC # BLD: 2.92 M/UL — LOW (ref 3.8–5.2)
RBC # FLD: 14.1 % — SIGNIFICANT CHANGE UP (ref 10.3–14.5)
RBC BLD AUTO: ABNORMAL
SODIUM SERPL-SCNC: 136 MMOL/L — SIGNIFICANT CHANGE UP (ref 135–145)
WBC # BLD: 9.14 K/UL — SIGNIFICANT CHANGE UP (ref 3.8–10.5)
WBC # FLD AUTO: 9.14 K/UL — SIGNIFICANT CHANGE UP (ref 3.8–10.5)

## 2025-05-07 PROCEDURE — 99239 HOSP IP/OBS DSCHRG MGMT >30: CPT

## 2025-05-07 RX ORDER — PREDNISONE 20 MG/1
2 TABLET ORAL
Qty: 10 | Refills: 0
Start: 2025-05-07 | End: 2025-05-11

## 2025-05-07 RX ORDER — CEFPODOXIME PROXETIL 200 MG/1
1 TABLET, FILM COATED ORAL
Qty: 4 | Refills: 0
Start: 2025-05-07 | End: 2025-05-08

## 2025-05-07 RX ORDER — DOXYCYCLINE HYCLATE 100 MG
1 TABLET ORAL
Qty: 4 | Refills: 0
Start: 2025-05-07 | End: 2025-05-08

## 2025-05-07 RX ORDER — DEXTROMETHORPHAN HBR, GUAIFENESIN 200 MG/10ML
1 LIQUID ORAL
Qty: 60 | Refills: 0
Start: 2025-05-07 | End: 2025-06-05

## 2025-05-07 RX ORDER — FLUTICASONE PROPIONATE 50 UG/1
1 SPRAY, METERED NASAL
Qty: 1 | Refills: 0
Start: 2025-05-07 | End: 2025-05-16

## 2025-05-07 RX ORDER — LEVALBUTEROL HYDROCHLORIDE 1.25 MG/3ML
2 SOLUTION RESPIRATORY (INHALATION)
Qty: 1 | Refills: 0
Start: 2025-05-07 | End: 2025-06-05

## 2025-05-07 RX ORDER — DOXYCYCLINE HYCLATE 100 MG
1 TABLET ORAL
Qty: 6 | Refills: 0
Start: 2025-05-07 | End: 2025-05-09

## 2025-05-07 RX ORDER — HYDROCODONE/HOMATROPINE
5 SYRUP ORAL
Qty: 45 | Refills: 0
Start: 2025-05-07 | End: 2025-05-09

## 2025-05-07 RX ORDER — CEFPODOXIME PROXETIL 200 MG/1
1 TABLET, FILM COATED ORAL
Qty: 6 | Refills: 0
Start: 2025-05-07 | End: 2025-05-09

## 2025-05-07 RX ORDER — METOPROLOL SUCCINATE 50 MG/1
1 TABLET, EXTENDED RELEASE ORAL
Qty: 60 | Refills: 0
Start: 2025-05-07 | End: 2025-06-05

## 2025-05-07 RX ADMIN — Medication 100 MILLIGRAM(S): at 05:12

## 2025-05-07 RX ADMIN — Medication 40 MILLIGRAM(S): at 12:27

## 2025-05-07 RX ADMIN — Medication 1 APPLICATION(S): at 05:12

## 2025-05-07 RX ADMIN — CEFTRIAXONE 1000 MILLIGRAM(S): 500 INJECTION, POWDER, FOR SOLUTION INTRAMUSCULAR; INTRAVENOUS at 01:42

## 2025-05-07 RX ADMIN — HEPARIN SODIUM 5000 UNIT(S): 1000 INJECTION INTRAVENOUS; SUBCUTANEOUS at 05:12

## 2025-05-07 RX ADMIN — METOPROLOL SUCCINATE 25 MILLIGRAM(S): 50 TABLET, EXTENDED RELEASE ORAL at 05:12

## 2025-05-07 RX ADMIN — DEXTROMETHORPHAN HBR, GUAIFENESIN 600 MILLIGRAM(S): 200 LIQUID ORAL at 05:12

## 2025-05-07 RX ADMIN — Medication 650 MILLIGRAM(S): at 14:24

## 2025-05-07 RX ADMIN — Medication 650 MILLIGRAM(S): at 13:24

## 2025-05-07 RX ADMIN — HEPARIN SODIUM 5000 UNIT(S): 1000 INJECTION INTRAVENOUS; SUBCUTANEOUS at 13:21

## 2025-05-07 RX ADMIN — FLUTICASONE PROPIONATE 1 SPRAY(S): 50 SPRAY, METERED NASAL at 05:11

## 2025-05-07 NOTE — DISCHARGE NOTE PROVIDER - HOSPITAL COURSE
Mrs. Hendricks is a 78 yo F with a PMH significant for Lung Cancer and CLL sent to the ED by PCP to be evaluated. Endorses 2-week history of URI symptoms, productive cough, congestion, Rhinorrhea was originally improving but the past 2 days symptoms have worsened with SOB, fatigue & cough. Noted on imaging to have RLL PNA, slightly increased from prior (with comparison to recent PET scan in the eduadr of april). Patient met sepsis criteria on admission in setting of PNA, started on Rocephin / Doxycycline as patient is allergic to azithromycin / erythromycin and penicillin. Noted patient saturating well on RA however desaturates on exertion, home O2 eval shows patient will benefit from home oxygent at this time. Noted patient with tachycardia, while ambulating primarily due to hypoxia, seen by cardiology, ruled to be MAT (multifocal atrial tachycardia) as opposed to atrial fibrillation, improved with oxygenation and metoprolol, TTE without acute abnormalities, will continue to follow with cardiology outpatient. Patient is currently medically stable for discharge home with continued follow up with pulmonology and cardiology as well as PCP.

## 2025-05-07 NOTE — DISCHARGE NOTE NURSING/CASE MANAGEMENT/SOCIAL WORK - NSDCFUADDAPPT_GEN_ALL_CORE_FT
Please see below for post hospital follow up information     1. [facility] Meds To Bed: [phone]    2. Home Care: [facility]    3. Transitional Care Services: [phone]    4, A. Primary Care Appointment:  You have a post hospitalization appointment with Dr Ryan Medina on May 15, 2025 @ 11:45AM  [address]  If you cannot make this appointment and need to reschedule please call [phone]    4, B. Specialty Appointment:  You have a pulmonary follow up appointment with Dr Shields on June 5, 2025 @ 3:30PM  [address], [place], [place]  if you cannot make this appointment and need to reschedule please call [phone]    5. [facility] Education Packet Provided

## 2025-05-07 NOTE — DISCHARGE NOTE NURSING/CASE MANAGEMENT/SOCIAL WORK - NSSCTYPOFSERV_GEN_ALL_CORE
February 15, 2022     Patient: Alise Lyn   YOB: 1998   Date of Visit: 2/15/2022       To Whom it May Concern:    Alise Lyn was seen in the Urgent Care on 2/15/2022.    Please excuse Alise for her absence from work on the date listed above.    Sincerely,         Jerry Ge PA-C    Medical information is confidential and cannot be disclosed without the written consent of the patient or her representative.    DS/sb  
visiting RN, Physical theray

## 2025-05-07 NOTE — DISCHARGE NOTE PROVIDER - NSDCCPCAREPLAN_GEN_ALL_CORE_FT
PRINCIPAL DISCHARGE DIAGNOSIS  Diagnosis: Pneumonia  Assessment and Plan of Treatment: RLL PNA noted on imaging with sepsis criteria met on admission  improving with abx's continue course as prescribed   repeat imaging in 6-8 weeks recommended outpatient   Continue medications as prescribed      SECONDARY DISCHARGE DIAGNOSES  Diagnosis: Acute hypoxic respiratory failure  Assessment and Plan of Treatment: improving, however requires home oxygen at this time   will likely only need short term oxygen for Acute PNA   mixed viral and bacterial origin as rhino/enterovirus positive  continue medications as prescribed   continue flonase, Vantin / Doxycycline, xopenex / steroids as ordered    Diagnosis: Multifocal atrial tachycardia  Assessment and Plan of Treatment: noted this admission, likely in setting of hypoxia / pneumonia   rate controlled at this time   continue home O2 use, continue metoprolol use   seen by cardiology, TTE without abnormalities

## 2025-05-07 NOTE — DISCHARGE NOTE PROVIDER - ATTENDING DISCHARGE PHYSICAL EXAMINATION:
GENERAL: NAD, comfortable in bed. Saturating well on 2L NC   HEAD:  Atraumatic, Normocephalic  EYES: EOMI, PERRL, conjunctiva and sclera clear  NECK: Supple, No JVD  LUNG: decreased Breath sounds B/L, cough on deep inspiration    HEART: Regular rate and rhythm; No murmurs, rubs, or gallops  ABDOMEN: +BS, Soft, Nontender, Nondistended  EXTREMITIES:  2+ Peripheral Pulses, No clubbing, cyanosis, or edema  MSK: Severe Kyphosis   PSYCH: normal mood and affect  NEUROLOGY: AAOx3, non-focal No gross  SKIN: No rashes or lesions

## 2025-05-07 NOTE — DISCHARGE NOTE NURSING/CASE MANAGEMENT/SOCIAL WORK - PATIENT PORTAL LINK FT
You can access the FollowMyHealth Patient Portal offered by [facility] by registering at the following website: [email or web address]. By joining [employer]’s FollowMyHealth portal, you will also be able to view your health information using other applications (apps) compatible with our system.

## 2025-05-07 NOTE — DISCHARGE NOTE PROVIDER - CARE PROVIDER_API CALL
Ho Shields  Pulmonary Disease  [address], [address]  [place] [address]  Phone: [phone]  Fax: [phone]  Follow Up Time: 2 weeks    Kermit Cervantes  Cardiovascular Disease  [address], [address]  [place] [address]  Phone: [phone]  Fax: [phone]  Follow Up Time: 2 weeks    PCP,   Phone: (   )    -  Fax: (   )    -  Follow Up Time:

## 2025-05-07 NOTE — DISCHARGE NOTE NURSING/CASE MANAGEMENT/SOCIAL WORK - FINANCIAL ASSISTANCE
[facility] provides services at a reduced cost to those who are determined to be eligible through [facility]’s financial assistance program. Information regarding [facility]’s financial assistance program can be found by going to [email or web address] or by calling [phone].

## 2025-05-07 NOTE — DISCHARGE NOTE PROVIDER - NSDCFUADDAPPT_GEN_ALL_CORE_FT
Please see below for post hospital follow up information     1. [facility] Meds To Bed: [phone]    2. Home Care: [facility]    3. Transitional Care Services: [phone]    4, A. Primary Care Appointment:  You have a post hospitalization appointment with Dr Ryan Medina on May 15, 2025 @ 11:45AM  [address]  If you cannot make this appointment and need to reschedule please call [phone]    4, B. Specialty Appointment:  You have a pulmonary follow up appointment with Dr Shields on June 5, 2025 @ 3:30PM  [address], [place], [place]  if you cannot make this appointment and need to reschedule please call [phone]    5. [facility] Education Packet Provided

## 2025-05-07 NOTE — DISCHARGE NOTE PROVIDER - NSDCMRMEDTOKEN_GEN_ALL_CORE_FT
Adderall 30 mg oral tablet: 0.5 tab(s) orally once a day  Anoro Ellipta 62.5 mcg-25 mcg/inh inhalation powder: 1 puff(s) inhaled once a day  cefpodoxime 200 mg oral tablet: 1 tab(s) orally 2 times a day  Flonase 50 mcg/inh nasal spray: 1 spray(s) nasal 2 times a day as needed for  cough continue flonase BID if feeling of post nasal drip continues  Hycodan 5 mg-1.5 mg/5 mL oral syrup: 5 milliliter(s) orally every 8 hours as needed for Cough MDD: 15 mL  Metoprolol Tartrate 25 mg oral tablet: 1 tab(s) orally 2 times a day  Morgidox 100 mg oral capsule: 1 cap(s) orally 2 times a day  Mucinex 600 mg oral tablet, extended release: 1 tab(s) orally every 12 hours  oxyCODONE 5 mg oral tablet: 1 tab(s) orally every 6 hours as needed for Moderate Pain (4 - 6) MDD: 4  predniSONE 20 mg oral tablet: 2 tab(s) orally once a day Continue prednisone 40 mg x5 days for reactive airway  Protonix 40 mg oral delayed release tablet: 1 tab(s) orally once a day  Xopenex HFA 45 mcg/inh inhalation aerosol: 2 puff(s) inhaled 4 times a day as needed for  shortness of breath and/or wheezing

## 2025-05-07 NOTE — DISCHARGE NOTE PROVIDER - PROVIDER TOKENS
PROVIDER:[TOKEN:[[id number]],FOLLOWUP:[2 weeks]],PROVIDER:[TOKEN:[[id number]],FOLLOWUP:[2 weeks]],FREE:[LAST:[PCP],PHONE:[(   )    -],FAX:[(   )    -]]

## 2025-05-07 NOTE — DISCHARGE NOTE PROVIDER - CARE PROVIDERS DIRECT ADDRESSES
,[email or web address],DirectAddress_Unknown

## 2025-05-08 LAB
CULTURE RESULTS: SIGNIFICANT CHANGE UP
SPECIMEN SOURCE: SIGNIFICANT CHANGE UP

## 2025-05-16 ENCOUNTER — APPOINTMENT (OUTPATIENT)
Dept: PULMONOLOGY | Facility: CLINIC | Age: 80
End: 2025-05-16
Payer: MEDICARE

## 2025-05-16 VITALS
BODY MASS INDEX: 20.76 KG/M2 | RESPIRATION RATE: 16 BRPM | HEART RATE: 98 BPM | WEIGHT: 103 LBS | OXYGEN SATURATION: 92 % | HEIGHT: 59 IN

## 2025-05-16 VITALS — DIASTOLIC BLOOD PRESSURE: 76 MMHG | SYSTOLIC BLOOD PRESSURE: 122 MMHG

## 2025-05-16 VITALS — OXYGEN SATURATION: 96 %

## 2025-05-16 DIAGNOSIS — M40.205 UNSPECIFIED KYPHOSIS, THORACOLUMBAR REGION: ICD-10-CM

## 2025-05-16 DIAGNOSIS — J18.9 PNEUMONIA, UNSPECIFIED ORGANISM: ICD-10-CM

## 2025-05-16 DIAGNOSIS — C80.1 MALIGNANT (PRIMARY) NEOPLASM, UNSPECIFIED: ICD-10-CM

## 2025-05-16 PROCEDURE — 99215 OFFICE O/P EST HI 40 MIN: CPT

## 2025-05-16 PROCEDURE — G2211 COMPLEX E/M VISIT ADD ON: CPT

## 2025-05-16 RX ORDER — PREDNISONE 10 MG/1
10 TABLET ORAL
Qty: 14 | Refills: 0 | Status: ACTIVE | COMMUNITY
Start: 2025-05-16 | End: 1900-01-01

## 2025-05-16 RX ORDER — ALBUTEROL SULFATE 90 UG/1
108 (90 BASE) INHALANT RESPIRATORY (INHALATION)
Refills: 0 | Status: ACTIVE | COMMUNITY

## 2025-05-16 RX ORDER — DOXYCYCLINE HYCLATE 100 MG/1
100 TABLET ORAL
Qty: 10 | Refills: 4 | Status: ACTIVE | COMMUNITY
Start: 2025-05-16 | End: 1900-01-01

## 2025-05-19 ENCOUNTER — NON-APPOINTMENT (OUTPATIENT)
Age: 80
End: 2025-05-19

## 2025-05-20 ENCOUNTER — NON-APPOINTMENT (OUTPATIENT)
Age: 80
End: 2025-05-20

## 2025-05-20 ENCOUNTER — APPOINTMENT (OUTPATIENT)
Dept: CARDIOLOGY | Facility: CLINIC | Age: 80
End: 2025-05-20
Payer: MEDICARE

## 2025-05-20 ENCOUNTER — APPOINTMENT (OUTPATIENT)
Dept: CARDIOLOGY | Facility: CLINIC | Age: 80
End: 2025-05-20

## 2025-05-20 VITALS
DIASTOLIC BLOOD PRESSURE: 72 MMHG | HEIGHT: 60 IN | HEART RATE: 99 BPM | OXYGEN SATURATION: 95 % | BODY MASS INDEX: 20.42 KG/M2 | SYSTOLIC BLOOD PRESSURE: 114 MMHG | WEIGHT: 104 LBS

## 2025-05-20 VITALS — SYSTOLIC BLOOD PRESSURE: 110 MMHG | DIASTOLIC BLOOD PRESSURE: 70 MMHG

## 2025-05-20 DIAGNOSIS — R07.89 OTHER CHEST PAIN: ICD-10-CM

## 2025-05-20 DIAGNOSIS — R42 DIZZINESS AND GIDDINESS: ICD-10-CM

## 2025-05-20 DIAGNOSIS — R53.83 OTHER FATIGUE: ICD-10-CM

## 2025-05-20 DIAGNOSIS — R00.0 TACHYCARDIA, UNSPECIFIED: ICD-10-CM

## 2025-05-20 DIAGNOSIS — L97.919 NON-PRESSURE CHRONIC ULCER OF UNSPECIFIED PART OF RIGHT LOWER LEG WITH UNSPECIFIED SEVERITY: ICD-10-CM

## 2025-05-20 DIAGNOSIS — I47.19 OTHER SUPRAVENTRICULAR TACHYCARDIA: ICD-10-CM

## 2025-05-20 DIAGNOSIS — E78.5 HYPERLIPIDEMIA, UNSPECIFIED: ICD-10-CM

## 2025-05-20 DIAGNOSIS — Z00.00 ENCOUNTER FOR GENERAL ADULT MEDICAL EXAMINATION W/OUT ABNORMAL FINDINGS: ICD-10-CM

## 2025-05-20 PROCEDURE — 99214 OFFICE O/P EST MOD 30 MIN: CPT | Mod: 25

## 2025-05-20 PROCEDURE — 93925 LOWER EXTREMITY STUDY: CPT

## 2025-05-20 PROCEDURE — 93923 UPR/LXTR ART STDY 3+ LVLS: CPT

## 2025-05-20 PROCEDURE — 93000 ELECTROCARDIOGRAM COMPLETE: CPT

## 2025-05-20 RX ORDER — METOPROLOL SUCCINATE 25 MG/1
25 TABLET, EXTENDED RELEASE ORAL DAILY
Qty: 90 | Refills: 3 | Status: ACTIVE | COMMUNITY
Start: 2025-05-20 | End: 1900-01-01

## 2025-05-20 RX ORDER — PANTOPRAZOLE 40 MG/1
40 TABLET, DELAYED RELEASE ORAL AS DIRECTED
Refills: 0 | Status: ACTIVE | COMMUNITY

## 2025-05-20 RX ORDER — METOPROLOL TARTRATE 25 MG/1
25 TABLET ORAL TWICE DAILY
Refills: 0 | Status: DISCONTINUED | COMMUNITY
End: 2025-05-20

## 2025-05-21 ENCOUNTER — APPOINTMENT (OUTPATIENT)
Dept: DERMATOLOGY | Facility: CLINIC | Age: 80
End: 2025-05-21
Payer: MEDICARE

## 2025-05-21 PROCEDURE — 99213 OFFICE O/P EST LOW 20 MIN: CPT

## 2025-05-28 ENCOUNTER — APPOINTMENT (OUTPATIENT)
Dept: RADIOLOGY | Facility: CLINIC | Age: 80
End: 2025-05-28
Payer: MEDICARE

## 2025-05-28 ENCOUNTER — OUTPATIENT (OUTPATIENT)
Dept: OUTPATIENT SERVICES | Facility: HOSPITAL | Age: 80
LOS: 1 days | End: 2025-05-28
Payer: MEDICARE

## 2025-05-28 DIAGNOSIS — J18.9 PNEUMONIA, UNSPECIFIED ORGANISM: ICD-10-CM

## 2025-05-28 DIAGNOSIS — Z98.890 OTHER SPECIFIED POSTPROCEDURAL STATES: Chronic | ICD-10-CM

## 2025-05-28 DIAGNOSIS — Z90.710 ACQUIRED ABSENCE OF BOTH CERVIX AND UTERUS: Chronic | ICD-10-CM

## 2025-05-28 DIAGNOSIS — Z98.49 CATARACT EXTRACTION STATUS, UNSPECIFIED EYE: Chronic | ICD-10-CM

## 2025-05-28 PROCEDURE — 71046 X-RAY EXAM CHEST 2 VIEWS: CPT | Mod: 26

## 2025-05-28 PROCEDURE — 71046 X-RAY EXAM CHEST 2 VIEWS: CPT

## 2025-06-02 PROCEDURE — 93306 TTE W/DOPPLER COMPLETE: CPT

## 2025-06-02 PROCEDURE — 87040 BLOOD CULTURE FOR BACTERIA: CPT

## 2025-06-02 PROCEDURE — 85610 PROTHROMBIN TIME: CPT

## 2025-06-02 PROCEDURE — 87641 MR-STAPH DNA AMP PROBE: CPT

## 2025-06-02 PROCEDURE — 87640 STAPH A DNA AMP PROBE: CPT

## 2025-06-02 PROCEDURE — 80053 COMPREHEN METABOLIC PANEL: CPT

## 2025-06-02 PROCEDURE — 83735 ASSAY OF MAGNESIUM: CPT

## 2025-06-02 PROCEDURE — 36415 COLL VENOUS BLD VENIPUNCTURE: CPT

## 2025-06-02 PROCEDURE — 85730 THROMBOPLASTIN TIME PARTIAL: CPT

## 2025-06-02 PROCEDURE — 85025 COMPLETE CBC W/AUTO DIFF WBC: CPT

## 2025-06-02 PROCEDURE — 83605 ASSAY OF LACTIC ACID: CPT

## 2025-06-02 PROCEDURE — 96375 TX/PRO/DX INJ NEW DRUG ADDON: CPT

## 2025-06-02 PROCEDURE — 80061 LIPID PANEL: CPT

## 2025-06-02 PROCEDURE — 84484 ASSAY OF TROPONIN QUANT: CPT

## 2025-06-02 PROCEDURE — 83036 HEMOGLOBIN GLYCOSYLATED A1C: CPT

## 2025-06-02 PROCEDURE — 87086 URINE CULTURE/COLONY COUNT: CPT

## 2025-06-02 PROCEDURE — 0225U NFCT DS DNA&RNA 21 SARSCOV2: CPT

## 2025-06-02 PROCEDURE — 81001 URINALYSIS AUTO W/SCOPE: CPT

## 2025-06-02 PROCEDURE — 93005 ELECTROCARDIOGRAM TRACING: CPT

## 2025-06-02 PROCEDURE — 94640 AIRWAY INHALATION TREATMENT: CPT

## 2025-06-02 PROCEDURE — 96374 THER/PROPH/DIAG INJ IV PUSH: CPT

## 2025-06-02 PROCEDURE — 71275 CT ANGIOGRAPHY CHEST: CPT | Mod: MC

## 2025-06-02 PROCEDURE — 84100 ASSAY OF PHOSPHORUS: CPT

## 2025-06-02 PROCEDURE — 80048 BASIC METABOLIC PNL TOTAL CA: CPT

## 2025-06-02 PROCEDURE — 99285 EMERGENCY DEPT VISIT HI MDM: CPT

## 2025-06-02 PROCEDURE — 71045 X-RAY EXAM CHEST 1 VIEW: CPT

## 2025-06-02 PROCEDURE — 85027 COMPLETE CBC AUTOMATED: CPT

## 2025-06-03 ENCOUNTER — APPOINTMENT (OUTPATIENT)
Dept: CARDIOLOGY | Facility: CLINIC | Age: 80
End: 2025-06-03

## 2025-06-03 PROCEDURE — 93015 CV STRESS TEST SUPVJ I&R: CPT

## 2025-06-03 PROCEDURE — A9502: CPT

## 2025-06-03 PROCEDURE — 78452 HT MUSCLE IMAGE SPECT MULT: CPT

## 2025-06-12 ENCOUNTER — RESULT CHARGE (OUTPATIENT)
Age: 80
End: 2025-06-12

## 2025-06-12 ENCOUNTER — APPOINTMENT (OUTPATIENT)
Dept: PULMONOLOGY | Facility: CLINIC | Age: 80
End: 2025-06-12

## 2025-06-12 ENCOUNTER — APPOINTMENT (OUTPATIENT)
Dept: PULMONOLOGY | Facility: CLINIC | Age: 80
End: 2025-06-12
Payer: MEDICARE

## 2025-06-12 VITALS
DIASTOLIC BLOOD PRESSURE: 74 MMHG | RESPIRATION RATE: 16 BRPM | SYSTOLIC BLOOD PRESSURE: 114 MMHG | OXYGEN SATURATION: 98 % | HEART RATE: 80 BPM

## 2025-06-12 VITALS — BODY MASS INDEX: 21.57 KG/M2 | WEIGHT: 107 LBS | HEIGHT: 59 IN

## 2025-06-12 PROCEDURE — 94010 BREATHING CAPACITY TEST: CPT

## 2025-06-12 PROCEDURE — 99214 OFFICE O/P EST MOD 30 MIN: CPT | Mod: 25

## 2025-06-22 PROCEDURE — 93228 REMOTE 30 DAY ECG REV/REPORT: CPT

## 2025-06-26 ENCOUNTER — OUTPATIENT (OUTPATIENT)
Dept: OUTPATIENT SERVICES | Facility: HOSPITAL | Age: 80
LOS: 1 days | End: 2025-06-26
Payer: MEDICARE

## 2025-06-26 ENCOUNTER — APPOINTMENT (OUTPATIENT)
Dept: CT IMAGING | Facility: CLINIC | Age: 80
End: 2025-06-26

## 2025-06-26 DIAGNOSIS — Z98.890 OTHER SPECIFIED POSTPROCEDURAL STATES: Chronic | ICD-10-CM

## 2025-06-26 DIAGNOSIS — C91.10 CHRONIC LYMPHOCYTIC LEUKEMIA OF B-CELL TYPE NOT HAVING ACHIEVED REMISSION: ICD-10-CM

## 2025-06-26 DIAGNOSIS — Z90.710 ACQUIRED ABSENCE OF BOTH CERVIX AND UTERUS: Chronic | ICD-10-CM

## 2025-06-26 PROCEDURE — 71250 CT THORAX DX C-: CPT

## 2025-06-26 PROCEDURE — 71250 CT THORAX DX C-: CPT | Mod: 26

## 2025-06-27 ENCOUNTER — NON-APPOINTMENT (OUTPATIENT)
Age: 80
End: 2025-06-27

## 2025-06-27 RX ORDER — LEVOFLOXACIN 500 MG/1
500 TABLET, FILM COATED ORAL DAILY
Qty: 7 | Refills: 3 | Status: ACTIVE | COMMUNITY
Start: 2025-06-27 | End: 1900-01-01

## 2025-07-07 ENCOUNTER — NON-APPOINTMENT (OUTPATIENT)
Age: 80
End: 2025-07-07

## 2025-07-17 ENCOUNTER — APPOINTMENT (OUTPATIENT)
Dept: DERMATOLOGY | Facility: CLINIC | Age: 80
End: 2025-07-17

## 2025-07-31 NOTE — DISCHARGE NOTE PROVIDER - NSDCFUSCHEDAPPT_GEN_ALL_CORE_FT
[facility]: Cancer Care                                                                                          Incoming Call:   Voicemail, returning writers call      Outgoing Call:     Left voicemail asking for a return call.       Letty Garcia MSN, RN ,OCN  Lead RN Care Coordinator - [facility]  [phone]    [facility]  [facility] [address]   Scheduled Appointment: 07/17/2025

## 2025-08-06 ENCOUNTER — APPOINTMENT (OUTPATIENT)
Dept: THORACIC SURGERY | Facility: CLINIC | Age: 80
End: 2025-08-06
Payer: MEDICARE

## 2025-08-06 ENCOUNTER — NON-APPOINTMENT (OUTPATIENT)
Age: 80
End: 2025-08-06

## 2025-08-06 VITALS
BODY MASS INDEX: 22.38 KG/M2 | DIASTOLIC BLOOD PRESSURE: 78 MMHG | HEART RATE: 101 BPM | WEIGHT: 111 LBS | HEIGHT: 59 IN | OXYGEN SATURATION: 98 % | SYSTOLIC BLOOD PRESSURE: 137 MMHG

## 2025-08-06 DIAGNOSIS — C80.1 MALIGNANT (PRIMARY) NEOPLASM, UNSPECIFIED: ICD-10-CM

## 2025-08-06 DIAGNOSIS — R91.1 SOLITARY PULMONARY NODULE: ICD-10-CM

## 2025-08-06 PROCEDURE — 99213 OFFICE O/P EST LOW 20 MIN: CPT

## 2025-08-28 ENCOUNTER — APPOINTMENT (OUTPATIENT)
Dept: PULMONOLOGY | Facility: CLINIC | Age: 80
End: 2025-08-28

## (undated) DEVICE — SUT VICRYL 2-0 27" CT-1 UNDYED

## (undated) DEVICE — RADIOGRAPHY DVC SPEC TRANSPEC

## (undated) DEVICE — STAPLER SKIN PROXIMATE

## (undated) DEVICE — GLV 6 PROTEXIS

## (undated) DEVICE — DRAPE TOWEL BLUE 17" X 24"

## (undated) DEVICE — DRAPE INSTRUMENT POUCH

## (undated) DEVICE — BLANKET WARMER LOWER ADULT

## (undated) DEVICE — SPONGE RAYTEC 4X4 16PLY

## (undated) DEVICE — DRAPE LEGGINGS XL

## (undated) DEVICE — SUT MONOCRYL 4-0 27" PS-2 UNDYED

## (undated) DEVICE — DRAPE SPLIT SHEETS 77X108"

## (undated) DEVICE — PACK MINOR WITH LAP

## (undated) DEVICE — DRSG MASTISOL

## (undated) DEVICE — SUT VICRYL 3-0 27" SH UNDYED

## (undated) DEVICE — NDL HYPO REGULAR BEVEL 25G X 1.5"

## (undated) DEVICE — POSITIONER FOAM EGG CRATE ULNAR (2PCS)

## (undated) DEVICE — COVER PROBE T TIP INTRAOP SM

## (undated) DEVICE — WRAP COMPRESSION CALF MED

## (undated) DEVICE — DRAPE MAGNETIC INSTRUMENT MEDIUM

## (undated) DEVICE — SAVI SCOUT HANDPIECE

## (undated) DEVICE — DRSG STERISTRIPS 0.5X4"

## (undated) DEVICE — SUT DERMABOND 0.7ML

## (undated) DEVICE — SUT SILK 2-0 18" PS-2

## (undated) DEVICE — BLADE STD 2.5IN ELCTR MODIFIED

## (undated) DEVICE — BLADE SURGICAL #15 CARBON

## (undated) DEVICE — SYR CONTROL LUER LOK 10CC

## (undated) DEVICE — DRAPE 3/4 SHEET 52X76"